# Patient Record
Sex: FEMALE | Race: ASIAN | Employment: FULL TIME | URBAN - METROPOLITAN AREA
[De-identification: names, ages, dates, MRNs, and addresses within clinical notes are randomized per-mention and may not be internally consistent; named-entity substitution may affect disease eponyms.]

---

## 2022-02-14 ENCOUNTER — OFFICE VISIT (OUTPATIENT)
Dept: OBGYN CLINIC | Age: 32
End: 2022-02-14

## 2022-02-14 ENCOUNTER — INITIAL PRENATAL (OUTPATIENT)
Dept: OBGYN CLINIC | Age: 32
End: 2022-02-14

## 2022-02-14 VITALS
WEIGHT: 142 LBS | SYSTOLIC BLOOD PRESSURE: 96 MMHG | BODY MASS INDEX: 26.81 KG/M2 | HEIGHT: 61 IN | DIASTOLIC BLOOD PRESSURE: 62 MMHG

## 2022-02-14 DIAGNOSIS — Z34.90 PREGNANCY, UNSPECIFIED GESTATIONAL AGE: ICD-10-CM

## 2022-02-14 DIAGNOSIS — Z34.02 ENCOUNTER FOR SUPERVISION OF NORMAL FIRST PREGNANCY IN SECOND TRIMESTER: ICD-10-CM

## 2022-02-14 DIAGNOSIS — Z34.02 ENCOUNTER FOR SUPERVISION OF NORMAL FIRST PREGNANCY IN SECOND TRIMESTER: Primary | ICD-10-CM

## 2022-02-14 LAB
ABO + RH BLD: NORMAL
ANTIBODY SCREEN, EXTERNAL: NEGATIVE
BLOOD BANK CMNT PATIENT-IMP: NORMAL
BLOOD GROUP ANTIBODIES SERPL: NORMAL
CHLAMYDIA, EXTERNAL: NEGATIVE
COMMENT, HOLDF: NORMAL
ERYTHROCYTE [DISTWIDTH] IN BLOOD BY AUTOMATED COUNT: 13.2 % (ref 11.5–14.5)
HBSAG, EXTERNAL: NEGATIVE
HBV SURFACE AG SER QL: <0.1 INDEX
HBV SURFACE AG SER QL: NEGATIVE
HCT VFR BLD AUTO: 41.2 % (ref 35–47)
HCV AB SERPL QL IA: NONREACTIVE
HEPATITIS C AB,   EXT: NON REACTIVE
HGB BLD-MCNC: 13.4 G/DL (ref 11.5–16)
HIV 1+2 AB+HIV1 P24 AG SERPL QL IA: NONREACTIVE
HIV, EXTERNAL: NON REACTIVE
HIV12 RESULT COMMENT, HHIVC: NORMAL
MCH RBC QN AUTO: 31.9 PG (ref 26–34)
MCHC RBC AUTO-ENTMCNC: 32.5 G/DL (ref 30–36.5)
MCV RBC AUTO: 98.1 FL (ref 80–99)
N. GONORRHEA, EXTERNAL: NEGATIVE
NRBC # BLD: 0 K/UL (ref 0–0.01)
NRBC BLD-RTO: 0 PER 100 WBC
PLATELET # BLD AUTO: 263 K/UL (ref 150–400)
PMV BLD AUTO: 10.4 FL (ref 8.9–12.9)
RBC # BLD AUTO: 4.2 M/UL (ref 3.8–5.2)
RUBELLA, EXTERNAL: NORMAL
RUBV IGG SER-IMP: REACTIVE
RUBV IGG SERPL IA-ACNC: 64.2 IU/ML
SAMPLES BEING HELD,HOLD: NORMAL
SPECIMEN EXP DATE BLD: NORMAL
T. PALLIDUM, EXTERNAL: NON REACTIVE
TYPE, ABO & RH, EXTERNAL: NORMAL
WBC # BLD AUTO: 9.3 K/UL (ref 3.6–11)

## 2022-02-14 PROCEDURE — 0502F SUBSEQUENT PRENATAL CARE: CPT | Performed by: OBSTETRICS & GYNECOLOGY

## 2022-02-14 NOTE — PROGRESS NOTES
Current pregnancy history:    Arnaldo Roberts is a  27 y.o. female  Patient's last menstrual period was 10/31/2021 (exact date). .  She presents for the evaluation of amenorrhea and a positive pregnancy test.    LMP history:  The date of her LMP is certain. Her last menstrual period was normal.  A urine pregnancy test was positive      Based on her LMP, her EDC is 8/7/22 and her EGA is 15 weeks,1 day. Her menstrual cycles are regular and occur approximately every 28 days  and range from 3 to 5 days. Ultrasound data:  She had an  ultrasound done by the ultrasound tech today which revealed a viable beltran pregnancy with a gestational age of 14 weeks and 2 days giving an [address] of 8/6/22. TA ULTRASOUND PERFORMED  A SINGLE VIABLE 15W2D WITH ANKIT OF 08/06/2022 IUP IS SEEN WITH NORMAL CARDIAC RHYTHM. [facility]. THERE APPEARS TO BE HYPOECHOIC CYSTIC STRUCTURES SEEN WITHIN THE CHOROIDS BILATERALLY, RT  MEASURING 5 X 5 MM AND LT MEASURING 6 X 6 MM . CLINICAL COORELATION RECOMMENDED. POSTERIOR PLACENTA IS SEEN. GENDER: WNL  RIGHT OVARY APPEARS WITHIN NORMAL LIMITS. LEFT OVARY OBSCURED BY BOWEL GAS. NO FREE FLUID IS SEEN IN THE CDS. Pregnancy symptoms:    Since her LMP she has experienced  urinary frequency, breast tenderness, and nausea. She has not been vomiting over the last few weeks. Associated signs and symptoms which she denies: dysuria, discharge, vaginal bleeding. Relevant past pregnancy history:   She has the following pregnancy history: 2 SAB    Relevant past medical history:(relevant to this pregnancy): noncontributory. Pap/Occupational history:  Last pap smear: last year Results: Normal            Substance history: negative for alcohol, tobacco and street drugs. Positive for nothing. Exposure history: There is/are no indoor cat/s in the home. The patient was instructed to not change the cat litter.    She admits close contact with children on a regular basis. She has had chicken pox or the vaccine in the past.   Patient denies issues with domestic violence. Genetic Screening/Teratology Counseling: (Includes patient, baby's father, or anyone in either family with:)  3.  Patient's age >/= 28 at EDC?--no  2. Thalassemia ([facility], [place], [address], or  background): MCV<80?--no.     3.  Neural tube defect (meningomyelocele, spina bifida, anencephaly)?--no.   4.  Congenital heart defect?--no.  5.  Down syndrome?--no.   6.  Sanford-Sachs (J[employer]ism, [place])?--no.   7.  Canavan's Disease?--no.   8.  Familial Dysautonomia?--no.   9.  Sickle cell disease or trait ()? --no   The patient has not been tested for sickle trait  10. Hemophilia or other blood disorders?--no. 11.  Muscular dystrophy?--no. 12.  Cystic fibrosis?--no. 13.  Taniya's Chorea?--no. 14.  Mental retardation/autism (if yes was person tested for Fragile X)?--no. 15.  Other inherited genetic or chromosomal disorder?--no. 12.  Maternal metabolic disorder (DM, PKU, etc)?--no. 17.  Patient or FOB with a child with a birth defect not listed above?--no.  17a. Patient or FOB with a birth defect themselves?--no. 18.  Recurrent pregnancy loss, or stillbirth?--no. 19.  Any medications since LMP other than prenatal vitamins (include vitamins, supplements, OTC meds, drugs, alcohol)?--no. 20.  Any other genetic/environmental exposure to discuss?--no. Infection History:  1. Lives with someone with TB or TB exposed?--no.   2.  Patient or partner has history of genital herpes?--no.  3.  Rash or viral illness since LMP?--no.    4.  History of STD (GC, CT, HPV, syphilis, HIV)? --no   5. Other: OTHER? Past Medical History:   Diagnosis Date    Kidney congenitally absent, right      History reviewed. No pertinent surgical history.   Social History     Occupational History    Not on file   Tobacco Use    Smoking status: Never Smoker    Smokeless tobacco: Never Used   Vaping Use    Vaping Use: Never used   Substance and Sexual Activity    Alcohol use: Not Currently    Drug use: Never    Sexual activity: Yes     Partners: Female     Birth control/protection: None     No family history on file. OB History    Para Term  AB Living   3       2     SAB IAB Ectopic Molar Multiple Live Births   2                # Outcome Date GA Lbr Zafar/2nd Weight Sex Delivery Anes PTL Lv   3 Current            2 SAB            1 SAB              No Known Allergies  Prior to Admission medications    Medication Sig Start Date End Date Taking? Authorizing Provider   PNV GQ.09/WKSSABW fum/folic ac (PRENATAL PO) Take  by mouth.    Yes Provider, Historical        Review of Systems: History obtained from the patient  Constitutional: negative for weight loss, fever, night sweats  HEENT: negative for hearing loss, earache, congestion, snoring, sore throat  CV: negative for chest pain, palpitations, edema  Resp: negative for cough, shortness of breath, wheezing  Breast: negative for breast lumps, nipple discharge, galactorrhea  GI: negative for change in bowel habits, abdominal pain, black or bloody stools  : negative for frequency, dysuria, hematuria, vaginal discharge  MSK: negative for back pain, joint pain, muscle pain  Skin: negative for itching, rash, hives  Neuro: negative for dizziness, headache, confusion, weakness  Psych: negative for anxiety, depression, change in mood  Heme/lymph: negative for bleeding, bruising, pallor    Objective:  Visit Vitals  BP 96/62   Ht 5' 1\" (1.549 m)   Wt 142 lb (64.4 kg)   LMP 10/31/2021 (Exact Date)   BMI 26.83 kg/m²       Physical Exam:     Constitutional  · Appearance: well-nourished, well developed, alert, in no acute distress    HENT  · Head  · Face: appears normal  · Eyes: appear normal  · Ears: normal  · Mouth: normal  · Lips: no lesions      Chest  · Respiratory Effort: breathing unlabored Cardiovascular  · Heart:  · Auscultation: regular rate and rhythm without murmur      Gastrointestinal  · Abdominal Examination: abdomen non-tender to palpation, normal bowel sounds, no masses present  · Liver and spleen: no hepatomegaly present, spleen not palpable  · Hernias: no hernias identified    Genitourinary  · deferred    Skin  · General Inspection: no rash, no lesions identified    Neurologic/Psychiatric  · Mental Status:  · Orientation: grossly oriented to person, place and time  · Mood and Affect: mood normal, affect appropriate    Assessment:   Intrauterine pregnancy with issues addressed in problem list  Plan:   Patient declines presence of chaperone during today's visit. Offered CF testing, CVS, Nuchal Translucency, MSAFP, amnio, and discussed NIPT  Course of pregnancy discussed including visit schedule, routine U/S, glucola testing, etc.  Avoid alcoholic beverages and illicit/recreational drugs use  Take prenatal vitamins or folic acid daily. Hospital and practice style discussed with coverage system. Discussed nutrition, toxoplasmosis precautions, sexual activity, exercise, need for influenza vaccine, environmental and work hazards, travel advice, screen for domestic violence, need for seat belts. Discussed seafood, unpasteurized dairy products, deli meat, artificial sweeteners, and caffeine. Discussed current prescription drug use. Given medication list.  Discussed the use of over the counter medications and chemicals. Route of delivery discussed, including risks, benefits     Handouts given to pt.

## 2022-02-14 NOTE — PATIENT INSTRUCTIONS
Weeks 14 to 18 of Your Pregnancy: Care Instructions  Overview     During this time, you may start to \"show,\" so that you look pregnant to people around you. You may also notice some changes in your skin, such as itchy spots on your palms or acne on your face. Your baby is now able to pass urine. And your baby's first stool (meconium) is starting to collect in your baby's intestines. Hair is also starting to grow on your baby's head. At your next visit, between weeks 18 and 20, your doctor may do an ultrasound test. The test allows your doctor to check for certain problems. Your doctor can also tell the sex of your baby. So this a good time to think about whether you want to know. Talk to your doctor about getting a flu shot to help keep you healthy during your pregnancy. As your pregnancy moves along, it's common to worry or feel anxious. Your body is changing a lot. And you are thinking about giving birth, the health of your baby, and becoming a parent. You can talk to your doctor about any anxiety and stress you feel. Follow-up care is a key part of your treatment and safety. Be sure to make and go to all appointments, and call your doctor if you are having problems. It's also a good idea to know your test results and keep a list of the medicines you take. How can you care for yourself at home? Reduce stress    · Ask for help with cooking and housekeeping.     · Figure out who or what causes your stress. Avoid these people or situations as much as possible.     · Relax every day. Taking 10- to 15-minute breaks can make a big difference. Take a walk, listen to music, or take a warm bath.     · Learn relaxation techniques at prenatal or yoga class. Or buy a relaxation tape.     · List your fears about having a baby and becoming a parent. Share the list with someone you trust. Decide which worries are really small, and try to let them go.    Exercise    · If you did not exercise much before pregnancy, start slowly. Walking is best. [employer], and do a little more every day.     · Brisk walking, easy jogging, low-impact aerobics, water aerobics, and yoga are good choices. Some sports, such as scuba diving, horseback riding, downhill skiing, gymnastics, and water skiing, are not a good idea.     · Try to do at least 2½ hours a week of moderate exercise, such as a fast walk. One way to do this is to be active 30 minutes a day, at least 5 days a week.     · Wear loose clothing. And wear shoes and a bra that provide good support.     · Warm up and cool down to start and finish your exercise.     · If you want to use weights, be sure to use light weights. They reduce stress on your joints. Stay at the best weight for you    · Experts recommend that you gain about 1 pound a month during the first 3 months of your pregnancy.     · Experts recommend that you gain about 1 pound a week during your last 6 months of pregnancy, for a total weight gain of 25 to 35 pounds.     · If you are underweight, you will need to gain more weight (about 28 to 40 pounds).     · If you are overweight, you may not need to gain as much weight (about 15 to 25 pounds).     · If you are gaining weight too fast, use common sense. Exercise every day, and limit sweets, fast foods, and fats. Choose lean meats, fruits, and vegetables.     · If you are having twins or more, your doctor may refer you to a dietitian. Where can you learn more? Go to http://www.gray.com/  Enter I453 in the search box to learn more about \"Weeks 14 to 18 of Your Pregnancy: Care Instructions. \"  Current as of: June 16, 2021               Content Version: 13.0  © 2636-9051 Healthwise, Incorporated. Care instructions adapted under license by [employer] (which disclaims liability or warranty for this information).  If you have questions about a medical condition or this instruction, always ask your healthcare professional. [employer] disclaims any warranty or liability for your use of this information.

## 2022-02-15 LAB
T PALLIDUM AB SER QL IA: NON REACTIVE
VZV IGG SER IA-ACNC: 399 INDEX

## 2022-02-16 LAB
C TRACH RRNA SPEC QL NAA+PROBE: NEGATIVE
HGB A MFR BLD: 97 % (ref 96.4–98.8)
HGB A2 MFR BLD COLUMN CHROM: 2.7 % (ref 1.8–3.2)
HGB F MFR BLD: 0.3 % (ref 0–2)
HGB FRACT BLD-IMP: NORMAL
HGB S MFR BLD: 0 %
N GONORRHOEA RRNA SPEC QL NAA+PROBE: NEGATIVE
T VAGINALIS DNA SPEC QL NAA+PROBE: NEGATIVE

## 2022-02-16 RX ORDER — NITROFURANTOIN 25; 75 MG/1; MG/1
100 CAPSULE ORAL 2 TIMES DAILY
Qty: 14 CAPSULE | Refills: 0 | Status: SHIPPED | OUTPATIENT
Start: 2022-02-16 | End: 2022-02-23

## 2022-02-16 NOTE — PROGRESS NOTES
Phone call into patient to inform of results. Questions answered. Confirmed pharmacy. Confirmed allergies. Script macrobid 100mg BID x 7 days sent per Dr. Debora Jones.

## 2022-02-17 LAB
BACTERIA SPEC CULT: ABNORMAL
CC UR VC: ABNORMAL
SERVICE CMNT-IMP: ABNORMAL

## 2022-03-19 PROBLEM — Z34.02 ENCOUNTER FOR SUPERVISION OF NORMAL FIRST PREGNANCY IN SECOND TRIMESTER: Status: ACTIVE | Noted: 2022-02-14

## 2022-03-22 ENCOUNTER — ROUTINE PRENATAL (OUTPATIENT)
Dept: OBGYN CLINIC | Age: 32
End: 2022-03-22

## 2022-03-22 VITALS — SYSTOLIC BLOOD PRESSURE: 108 MMHG | BODY MASS INDEX: 28.15 KG/M2 | DIASTOLIC BLOOD PRESSURE: 70 MMHG | WEIGHT: 149 LBS

## 2022-03-22 DIAGNOSIS — Z34.02 ENCOUNTER FOR SUPERVISION OF NORMAL FIRST PREGNANCY IN SECOND TRIMESTER: ICD-10-CM

## 2022-03-22 DIAGNOSIS — Z34.80 SUPERVISION OF OTHER NORMAL PREGNANCY: Primary | ICD-10-CM

## 2022-03-22 PROCEDURE — 0502F SUBSEQUENT PRENATAL CARE: CPT | Performed by: OBSTETRICS & GYNECOLOGY

## 2022-03-22 NOTE — PATIENT INSTRUCTIONS
Weeks 18 to 22 of Your Pregnancy: Care Instructions  Overview     Your baby is continuing to develop quickly. Sometime between 18 and 22 weeks, you'll probably start to feel your baby move. At first, these small fetal movements feel like fluttering or \"butterflies. \" Or they may feel like gas bubbles. As your baby grows, these movements will become stronger. You may also notice that your baby hiccups. Babies at this stage can now suck their thumbs. You may find that your nausea and fatigue are gone. You may feel better overall and have more energy than you did in your first trimester. But you might now also have some new discomforts, like sleep problems or leg cramps. Talk to your doctor about things you can do at home to ease these problems. Follow-up care is a key part of your treatment and safety. Be sure to make and go to all appointments, and call your doctor if you are having problems. It's also a good idea to know your test results and keep a list of the medicines you take. How can you care for yourself at home? Ease sleep problems  · Avoid caffeine in drinks or chocolate late in the day. · Get some exercise every day. · Take a warm shower or bath before bed. · Have a light snack or glass of milk at bedtime. · Do relaxation exercises in bed to calm your mind and body. · Support your legs and back with extra pillows. Try a pillow between your legs if you sleep on your side. · Do not use sleeping pills or alcohol. They could harm your baby. Ease leg cramps  · Do not massage your calf during the cramp. · Sit on a firm bed or chair. Straighten your leg, and bend your foot (flex your ankle) slowly upward, toward your knee. Bend your toes up and down. · Stand on a cool, flat surface. Stretch your toes upward, and take small steps walking on your heels. · Use a heating pad or hot water bottle to help with muscle ache. Prevent leg cramps  · Be sure to get enough calcium.  If you are worried that you are not getting enough, talk to your doctor. · Exercise every day, and stretch your legs before bed. · Take a warm bath before bed, and try leg warmers at night. Where can you learn more? Go to http://www.gray.com/  Enter L694 in the search box to learn more about \"Weeks 18 to 22 of Your Pregnancy: Care Instructions. \"  Current as of: June 16, 2021               Content Version: 13.2  © 2006-2022 Healthwise, [employer]. Care instructions adapted under license by [employer] (which disclaims liability or warranty for this information). If you have questions about a medical condition or this instruction, always ask your healthcare professional. [address] any warranty or liability for your use of this information.

## 2022-03-22 NOTE — PROGRESS NOTES
Doing well overall  UTI TONI today    FAS today:  A SINGLE VIABLE IUP AT 20W2D IS SEEN. FETAL CARDIAC MOTION OBSERVED. FETAL ANATOMY WAS WELL VISUALIZED AND APPEARS WNL. NO ABNORMALITIES WERE SEEN ON TODAYS EXAM.  APPROPRIATE GROWTH MEASURED. SIZE = DATES. EVELYN AND CERVIX APPEAR WITHIN NORMAL LIMITS. POSTERIOR PLACENTA APPEARS 1 CM COVERING THE INTERNAL CERVICAL OS.   GENDER: WNL

## 2022-03-24 LAB
BACTERIA SPEC CULT: NORMAL
CC UR VC: NORMAL
SERVICE CMNT-IMP: NORMAL

## 2022-04-18 NOTE — PATIENT INSTRUCTIONS
Weeks 22 to 26 of Your Pregnancy: Care Instructions  Overview     As you enter your 7th month of pregnancy at week 26, your baby's lungs are growing stronger and getting ready to breathe. You may notice that your baby responds to the sound of your voice. You may also notice that your baby does less turning and twisting and more squirming, kicking, or jerking. Jerking often means that your baby has hiccups. Hiccups are normal and are only temporary. You may want to think about attending a childbirth preparation class. This is also a good time to start thinking about whether you want to have pain medicine during labor. You may be tested for gestational diabetes between weeks 25 and 28. Gestational diabetes occurs when your blood sugar level gets too high when you're pregnant. The test is important, because you can have gestational diabetes and not know it. But the condition can cause problems for your baby. Follow-up care is a key part of your treatment and safety. Be sure to make and go to all appointments, and call your doctor if you are having problems. It's also a good idea to know your test results and keep a list of the medicines you take. How can you care for yourself at home? Ease discomfort from your baby's kicking  · Change your position. Sometimes this will cause your baby to change position too. · Take a deep breath while you raise your arm over your head. Then breathe out while you drop your arm. Do Kegel exercises to prevent urine from leaking  · You can do Kegel exercises while you stand or sit. ? Squeeze the same muscles you would use to stop your urine. Your belly and thighs should not move. ? Hold the squeeze for 3 seconds, and then relax for 3 seconds. ? Start with 3 seconds. Then add 1 second each week until you are able to squeeze for 10 seconds. ? Repeat the exercise 10 to 15 times for each session. Do three or more sessions each day.   Ease or reduce swelling in your feet, ankles, hands, and fingers  · If your fingers are puffy, take off your rings. · Do not eat high-salt foods, such as potato chips. · Prop up your feet on a stool or couch as much as possible. Sleep with pillows under your feet. · Do not stand for long periods of time or wear tight shoes. · Wear support stockings. Where can you learn more? Go to http://www.cao.com/  Enter G264 in the search box to learn more about \"Weeks 22 to 26 of Your Pregnancy: Care Instructions. \"  Current as of: June 16, 2021               Content Version: 13.2  © 3090-3933 [employer]. Care instructions adapted under license by [employer] (which disclaims liability or warranty for this information). If you have questions about a medical condition or this instruction, always ask your healthcare professional. [address] any warranty or liability for your use of this information.

## 2022-04-19 ENCOUNTER — ROUTINE PRENATAL (OUTPATIENT)
Dept: OBGYN CLINIC | Age: 32
End: 2022-04-19

## 2022-04-19 VITALS — SYSTOLIC BLOOD PRESSURE: 122 MMHG | BODY MASS INDEX: 29.29 KG/M2 | DIASTOLIC BLOOD PRESSURE: 80 MMHG | WEIGHT: 155 LBS

## 2022-04-19 DIAGNOSIS — Z34.80 SUPERVISION OF OTHER NORMAL PREGNANCY, ANTEPARTUM: Primary | ICD-10-CM

## 2022-04-19 NOTE — PROGRESS NOTES
Patient consented to RN visit, MD at hospital  Counseled on heartburn prevention measures and medications safe in pregnancy; recommended pepcid  Discussed weight gain; advised adding in protein each meal, being mindful of carbs and healthy eating habits  Reviewed glucola at next visit  Will update MD

## 2022-04-19 NOTE — PROGRESS NOTES
Patient is doing well  Pt reports heart burn that causes nausea  +FM  Pt concerned about her weight gain

## 2022-05-16 NOTE — PATIENT INSTRUCTIONS
Weeks 26 to 30 of Your Pregnancy: Care Instructions  Overview     You are now entering your last trimester of pregnancy. Your baby is growing quickly. Kimberlymena Santana probably feel your baby moving around more often. Your doctor may ask you to count your baby's kicks. Your back may ache as your body gets used to your baby's size and length. If you haven't already had the Tdap shot during this pregnancy, talk to your doctor about getting it. It will help protect your  against pertussis infection. During this time, it's important to take care of yourself and pay attention to what your body needs. If you feel sexual, you can explore ways to be close with your partner that match your comfort and desire. Follow-up care is a key part of your treatment and safety. Be sure to make and go to all appointments, and call your doctor if you are having problems. It's also a good idea to know your test results and keep a list of the medicines you take. How can you care for yourself at home? Take it easy at work  · Take frequent breaks. If possible, stop working when you are tired, and rest during your lunch hour. · Take bathroom breaks every 2 hours. · Change positions often. If you sit for long periods, stand up and walk around. · When you stand for a long time, keep one foot on a low stool with your knee bent. After standing a lot, sit with your feet up. · Avoid fumes, chemicals, and tobacco smoke. Be sexual in your own way  · Having sex during pregnancy is okay, unless your doctor tells you not to. · You may be very interested in sex, or you may have no interest at all. · Your growing belly can make it hard to find a good position during intercourse. [place] and explore. · You may get cramps in your uterus when your partner touches your breasts. · A back rub may relieve the backache or cramps that sometimes follow orgasm. Learn about  labor  · Watch for signs of  labor.  You may be going into labor if:  ? You have menstrual-like cramps, with or without nausea. ? You have about 6 or more contractions in 1 hour, even after you have had a glass of water and are resting. ? You have a low, dull backache that does not go away when you change your position. ? You have pain or pressure in your pelvis that comes and goes in a pattern. ? You have intestinal cramping or flu-like symptoms, with or without diarrhea.  ? You notice an increase or change in your vaginal discharge. Discharge may be heavy, mucus-like, watery, or streaked with blood. ? Your water breaks. · If you think you have  labor:  ? Drink 2 or 3 glasses of water or juice. Not drinking enough fluids can cause contractions. ? Stop what you are doing, and empty your bladder. Then lie down on your left side for at least 1 hour. ? While lying on your side, find your breast bone. Put your fingers in the soft spot just below it. Move your fingers down toward your belly button to find the top of your uterus. Check to see if it is tight. ? Contractions can be weak or strong. Record your contractions for an hour. Time a contraction from the start of one contraction to the start of the next one.  ? Single or several strong contractions without a pattern are called [place]-Herrera contractions. They are practice contractions but not the start of labor. They often stop if you change what you are doing. ? Call your doctor if you have regular contractions. Where can you learn more? Go to http://www.gray.com/  Enter F542 in the search box to learn more about \"Weeks 26 to 30 of Your Pregnancy: Care Instructions. \"  Current as of: 2021               Content Version: 13.2  © 5452-9822 [employer]. Care instructions adapted under license by [employer] (which disclaims liability or warranty for this information).  If you have questions about a medical condition or this instruction, always ask your healthcare professional. [place] [address] any warranty or liability for your use of this information.

## 2022-05-17 ENCOUNTER — ROUTINE PRENATAL (OUTPATIENT)
Dept: OBGYN CLINIC | Age: 32
End: 2022-05-17
Payer: COMMERCIAL

## 2022-05-17 VITALS — WEIGHT: 159 LBS | BODY MASS INDEX: 30.04 KG/M2 | DIASTOLIC BLOOD PRESSURE: 69 MMHG | SYSTOLIC BLOOD PRESSURE: 114 MMHG

## 2022-05-17 DIAGNOSIS — Z34.02 ENCOUNTER FOR SUPERVISION OF NORMAL FIRST PREGNANCY IN SECOND TRIMESTER: ICD-10-CM

## 2022-05-17 DIAGNOSIS — Z34.83 ENCOUNTER FOR SUPERVISION OF OTHER NORMAL PREGNANCY IN THIRD TRIMESTER: Primary | ICD-10-CM

## 2022-05-17 PROCEDURE — 90715 TDAP VACCINE 7 YRS/> IM: CPT | Performed by: OBSTETRICS & GYNECOLOGY

## 2022-05-17 PROCEDURE — 0502F SUBSEQUENT PRENATAL CARE: CPT | Performed by: OBSTETRICS & GYNECOLOGY

## 2022-05-17 PROCEDURE — 90471 IMMUNIZATION ADMIN: CPT | Performed by: OBSTETRICS & GYNECOLOGY

## 2022-05-18 LAB
ERYTHROCYTE [DISTWIDTH] IN BLOOD BY AUTOMATED COUNT: 13.2 % (ref 11.5–14.5)
GLUCOSE 1H P 100 G GLC PO SERPL-MCNC: 150 MG/DL (ref 65–140)
HCT VFR BLD AUTO: 39.5 % (ref 35–47)
HGB BLD-MCNC: 12 G/DL (ref 11.5–16)
HIV 1+2 AB+HIV1 P24 AG SERPL QL IA: NONREACTIVE
HIV12 RESULT COMMENT, HHIVC: NORMAL
MCH RBC QN AUTO: 31.7 PG (ref 26–34)
MCHC RBC AUTO-ENTMCNC: 30.4 G/DL (ref 30–36.5)
MCV RBC AUTO: 104.2 FL (ref 80–99)
NRBC # BLD: 0 K/UL (ref 0–0.01)
NRBC BLD-RTO: 0 PER 100 WBC
PLATELET # BLD AUTO: 229 K/UL (ref 150–400)
PMV BLD AUTO: 10.8 FL (ref 8.9–12.9)
RBC # BLD AUTO: 3.79 M/UL (ref 3.8–5.2)
WBC # BLD AUTO: 8.8 K/UL (ref 3.6–11)

## 2022-05-19 LAB
BLOOD BANK CMNT PATIENT-IMP: NORMAL
BLOOD GROUP ANTIBODIES SERPL: NORMAL
T PALLIDUM AB SER QL IA: NON REACTIVE

## 2022-05-26 ENCOUNTER — LAB ONLY (OUTPATIENT)
Dept: OBGYN CLINIC | Age: 32
End: 2022-05-26

## 2022-05-26 DIAGNOSIS — Z34.83 ENCOUNTER FOR SUPERVISION OF OTHER NORMAL PREGNANCY IN THIRD TRIMESTER: Primary | ICD-10-CM

## 2022-05-27 DIAGNOSIS — O24.410 DIET CONTROLLED GESTATIONAL DIABETES MELLITUS (GDM) IN THIRD TRIMESTER: Primary | ICD-10-CM

## 2022-05-27 LAB
GESTATIONAL 3HR GTT,GESTA: ABNORMAL
GLUCOSE 1H P 100 G GLC PO SERPL-MCNC: 193 MG/DL (ref 65–180)
GLUCOSE P FAST SERPL-MCNC: 83 MG/DL (ref 65–95)
GLUCOSE, 2 HR,GSTT2: 173 MG/DL (ref 65–155)
GLUCOSE, 3 HR,GSTT3: 106 MG/DL (ref 65–140)

## 2022-05-27 RX ORDER — LANCETS
EACH MISCELLANEOUS
Qty: 200 EACH | Refills: 1 | Status: SHIPPED | OUTPATIENT
Start: 2022-05-27 | End: 2022-09-23 | Stop reason: ALTCHOICE

## 2022-05-27 RX ORDER — INSULIN PUMP SYRINGE, 3 ML
EACH MISCELLANEOUS
Qty: 1 KIT | Refills: 0 | Status: SHIPPED | OUTPATIENT
Start: 2022-05-27 | End: 2022-09-23 | Stop reason: ALTCHOICE

## 2022-05-27 NOTE — PROGRESS NOTES
Spoke with patient, explained results of 3 hour GTT. Discussed need to start checking blood glucose at home, supplies to be sent to her pharmacy. Referral order placed for diabetic counseling, patient aware to expect them to reach out.

## 2022-05-27 NOTE — PATIENT INSTRUCTIONS
Weeks 30 to 32 of Your Pregnancy: Care Instructions  Overview     You've made it to the final months of your pregnancy! By now your baby is really starting to look like a baby, with hair and plump skin. As you enter the final weeks of pregnancy, the reality of having a baby may start to set in. This is a good time to set up a safe nursery and find quality  if needed. Doing this stuff ahead of time will allow you to focus on caring for and enjoying your new baby. You may also want to take a tour of your hospital's labor and delivery unit. This will help you get a better idea of what to expect while you're in the hospital.  During these last months, be sure to take good care of yourself. Pay attention to what your body needs. If your doctor says it's okay for you to work, don't push yourself too hard. If you haven't already had the Tdap shot during this pregnancy, talk to your doctor about getting it. It will help protect your  against pertussis infection. Follow-up care is a key part of your treatment and safety. Be sure to make and go to all appointments, and call your doctor if you are having problems. It's also a good idea to know your test results and keep a list of the medicines you take. How can you care for yourself at home? Pay attention to your baby's movements  · You should feel your baby move several times every day. · Your baby now turns less, and kicks and jabs more. · Your baby sleeps 20 to 45 minutes at a time and is more active at certain times of day. · If your doctor wants you to count your baby's kicks:  ? Empty your bladder, and lie on your side or relax in a comfortable chair. ? Write down your start time. ? Pay attention only to your baby's movements. Count any movement except hiccups. ? After you have counted 10 movements, write down your stop time. ? Write down how many minutes it took for your baby to move 10 times.   ? If an hour goes by and you have not recorded 10 movements, have something to eat or drink and then count for another hour. If you don't record at least 10 movements in the 2-hour period, call your doctor. Ease heartburn  · Eat small, frequent meals. · Do not eat chocolate, peppermint, or very spicy foods. Avoid drinks with caffeine, such as coffee, tea, and sodas. · Avoid bending over or lying down after meals. · Take a short walk after you eat. · If heartburn is a problem at night, do not eat for 2 hours before bedtime. · Take antacids like Mylanta, Maalox, Rolaids, or Tums. Do not take antacids that have sodium bicarbonate. Care for varicose veins  · Varicose veins are blood vessels that stretch out with the extra blood during pregnancy. Your legs may ache or throb. Most varicose veins will go away after the birth. · Avoid standing for long periods of time. Sit with your legs crossed at the ankles, not the knees. · Sit with your feet propped up. · Avoid tight clothing or stockings. Wear support hose. · Exercise regularly. Try walking for at least 30 minutes a day. Where can you learn more? Go to http://www.gray.com/  Enter X471 in the search box to learn more about \"Weeks 30 to 32 of Your Pregnancy: Care Instructions. \"  Current as of: June 16, 2021               Content Version: 13.2  © 8194-1618 [employer]. Care instructions adapted under license by [employer] (which disclaims liability or warranty for this information). If you have questions about a medical condition or this instruction, always ask your healthcare professional. [place] [address] any warranty or liability for your use of this information.

## 2022-05-31 ENCOUNTER — ROUTINE PRENATAL (OUTPATIENT)
Dept: OBGYN CLINIC | Age: 32
End: 2022-05-31
Payer: COMMERCIAL

## 2022-05-31 VITALS — DIASTOLIC BLOOD PRESSURE: 64 MMHG | SYSTOLIC BLOOD PRESSURE: 102 MMHG | BODY MASS INDEX: 30.04 KG/M2 | WEIGHT: 159 LBS

## 2022-05-31 DIAGNOSIS — Z34.83 ENCOUNTER FOR SUPERVISION OF OTHER NORMAL PREGNANCY IN THIRD TRIMESTER: Primary | ICD-10-CM

## 2022-05-31 PROCEDURE — 0502F SUBSEQUENT PRENATAL CARE: CPT | Performed by: OBSTETRICS & GYNECOLOGY

## 2022-05-31 NOTE — PROGRESS NOTES
Doing well, good FM  Elevated 3 hr GTT; still waiting to work with [facility]; will start checking accuchecks

## 2022-06-13 NOTE — PATIENT INSTRUCTIONS
Weeks 32 to 34 of Your Pregnancy: Care Instructions  Overview     During the last few weeks of your pregnancy, you may have more aches and pains. It's important to rest when you can. Your growing baby is putting more pressure on your bladder. So you may need to urinate more often. Hemorrhoids are also common. These are painful, itchy veins in the rectal area. You may want to talk with your doctor about banking your baby's umbilical cord blood. This is the blood left in the cord after birth. If you want to save this blood, you must arrange it ahead of time. You can't decide at the last minute. If you haven't already had the Tdap shot during this pregnancy, talk to your doctor about getting it. It will help protect your  against pertussis infection. Follow-up care is a key part of your treatment and safety. Be sure to make and go to all appointments, and call your doctor if you are having problems. It's also a good idea to know your test results and keep a list of the medicines you take. How can you care for yourself at home? Ease hemorrhoids  · Get more liquids, fruits, vegetables, and fiber in your diet. This will help keep your stools soft. · Avoid sitting for too long. Lie on your left side several times a day. · Clean yourself with soft, moist toilet paper. Or you can use witch hazel pads or personal hygiene pads. · If you are uncomfortable, try ice packs. Or you can sit in a warm sitz bath. Do these for 20 minutes at a time, as needed. · Use hydrocortisone cream for pain and itching. Two examples are Anusol and Preparation H Hydrocortisone. · Ask your doctor about taking an over-the-counter stool softener. Consider breastfeeding  · Experts recommend breastfeeding for 1 year or longer. · Breast milk may help protect your child from some health problems.  babies are less likely than formula-fed babies to:  ? Get ear infections, colds, diarrhea, and pneumonia. ?  Be obese or get diabetes later in life. · Breastfeeding causes the release of a hormone called oxytocin. This hormone may help your uterus shrink back faster. · Breastfeeding may help you lose weight faster. Making milk burns calories. · Breastfeeding can lower your risk of breast cancer, ovarian cancer, and osteoporosis. Decide about circumcision for your baby  · As you make this decision, it may help to think about your personal, Islam, and family traditions. You get to decide if you will keep your baby's penis natural or if your baby will be circumcised. · If you decide that you would like to have your baby circumcised, talk with your doctor. You can share your concerns about pain. And you can discuss your preferences for anesthesia. Where can you learn more? Go to http://www.mSpot.com/  Enter X711 in the search box to learn more about \"Weeks 32 to 34 of Your Pregnancy: Care Instructions. \"  Current as of: June 16, 2021               Content Version: 13.2  © 9916-6324 [employer]. Care instructions adapted under license by [employer] (which disclaims liability or warranty for this information). If you have questions about a medical condition or this instruction, always ask your healthcare professional. [address] any warranty or liability for your use of this information.

## 2022-06-14 ENCOUNTER — ROUTINE PRENATAL (OUTPATIENT)
Dept: OBGYN CLINIC | Age: 32
End: 2022-06-14
Payer: COMMERCIAL

## 2022-06-14 VITALS — WEIGHT: 161 LBS | DIASTOLIC BLOOD PRESSURE: 74 MMHG | SYSTOLIC BLOOD PRESSURE: 122 MMHG | BODY MASS INDEX: 30.42 KG/M2

## 2022-06-14 DIAGNOSIS — Z3A.32 32 WEEKS GESTATION OF PREGNANCY: Primary | ICD-10-CM

## 2022-06-14 DIAGNOSIS — Z34.02 ENCOUNTER FOR SUPERVISION OF NORMAL FIRST PREGNANCY IN SECOND TRIMESTER: ICD-10-CM

## 2022-06-14 PROCEDURE — 0502F SUBSEQUENT PRENATAL CARE: CPT | Performed by: OBSTETRICS & GYNECOLOGY

## 2022-06-14 NOTE — PROGRESS NOTES
Patient is doing well  Has values from taking blood sugars; overall, doing well but some mildly elevated; 36wk US  +FM  Has an increase in pelvic pain

## 2022-06-17 DIAGNOSIS — Z34.02 ENCOUNTER FOR SUPERVISION OF NORMAL FIRST PREGNANCY IN SECOND TRIMESTER: ICD-10-CM

## 2022-06-17 RX ORDER — METFORMIN HYDROCHLORIDE 500 MG/1
500 TABLET, EXTENDED RELEASE ORAL
Qty: 30 TABLET | Refills: 2 | Status: SHIPPED | OUTPATIENT
Start: 2022-06-17 | End: 2022-07-22

## 2022-06-22 ENCOUNTER — PATIENT MESSAGE (OUTPATIENT)
Dept: OBGYN CLINIC | Age: 32
End: 2022-06-22

## 2022-06-22 NOTE — TELEPHONE ENCOUNTER
Phone call in to patient to discuss concerns. Baby movement is good. She denies fever. Provided precautions on when to visit L&D. Reviewed symptoms with Dr. Xavier Meyer, advised patient to begin low dose aspirin, hold metformin at night if reducing food intake, and add ultrasound to 36-37 week appointment. Ultrasound already scheduled for 7/19. Patient verbalized understanding.

## 2022-06-30 ENCOUNTER — ROUTINE PRENATAL (OUTPATIENT)
Dept: OBGYN CLINIC | Age: 32
End: 2022-06-30
Payer: COMMERCIAL

## 2022-06-30 VITALS — DIASTOLIC BLOOD PRESSURE: 80 MMHG | BODY MASS INDEX: 29.85 KG/M2 | SYSTOLIC BLOOD PRESSURE: 120 MMHG | WEIGHT: 158 LBS

## 2022-06-30 DIAGNOSIS — Z3A.34 34 WEEKS GESTATION OF PREGNANCY: Primary | ICD-10-CM

## 2022-06-30 DIAGNOSIS — Z34.02 ENCOUNTER FOR SUPERVISION OF NORMAL FIRST PREGNANCY IN SECOND TRIMESTER: ICD-10-CM

## 2022-06-30 PROCEDURE — 0502F SUBSEQUENT PRENATAL CARE: CPT | Performed by: OBSTETRICS & GYNECOLOGY

## 2022-06-30 NOTE — PATIENT INSTRUCTIONS
Weeks 34 to 36 of Your Pregnancy: Care Instructions  Overview     By now, your baby and your belly have grown quite large. It's almost time to give birth! Your baby's lungs are almost ready to breathe air. The skull bones are firm enough to protect your baby's head, but soft enough to move down through the birth canal.  You may be feeling excited and happy at times--but also anxious or scared. You might wonder how you'll know if you're in labor or what to expect during labor. Try to be open and flexible in your expectations of the birth. Because each birth is different, there's no way to know exactly what childbirth will be like for you. Talk to your doctor or midwife about any concerns you have. If you haven't already had the Tdap shot during this pregnancy, talk to your doctor about getting it. It will help protect your  against pertussis infection. In the 36th week, you'll probably have a test for group B streptococcus (GBS). GBS is a common type of bacteria that can live in the vagina and rectum. It can make your baby sick after birth. If you test positive, you will get antibiotics during labor. The medicine will help keep your baby from getting the bacteria. Follow-up care is a key part of your treatment and safety. Be sure to make and go to all appointments, and call your doctor if you are having problems. It's also a good idea to know your test results and keep a list of the medicines you take. How can you care for yourself at home? Learn about pain relief choices  · Pain is different for everyone. Talk with your doctor about your feelings about pain. · You can choose from several types of pain relief. These include medicine, breathing techniques, and comfort measures. You can use more than one option. · If you choose to have pain medicine during labor, talk to your doctor about your options. Some medicines lower anxiety and help with some of the pain.  Others make your lower body numb so that you won't feel pain. · Be sure to tell your doctor about your pain medicine choice before you start labor or very early in your labor. You may be able to change your mind as labor progresses. Labor and delivery  · The first stage of labor has three parts: early, active, and transition. ? It's common to have early labor at home. You can stay busy or rest, eat light snacks, drink clear fluids, and start counting contractions. ? When talking during a contraction gets hard, you may be moving to active labor. During active labor, you should head for the hospital if you aren't there already. ? You are in active labor when contractions come every 3 to 4 minutes and last about 60 seconds. Your cervix is opening more rapidly. ? If your water breaks, contractions will come faster and stronger. ? During transition, your cervix is stretching, and contractions are coming more rapidly. ? You may want to push, but your cervix might not be ready. Your doctor will tell you when to push. · The second stage starts when your cervix is completely opened and you are ready to push. ? Contractions are very strong to push the baby down the birth canal.  ? You will probably feel the urge to push. You may feel like you need to have a bowel movement. ? You may be coached to push with contractions. These contractions will be very strong, but you won't have them as often. You can get a little rest between contractions. ? One last push, and your baby is born. · The third stage is when a few more contractions push out the placenta. This may take 30 minutes or less. Where can you learn more? Go to http://www.gray.com/  Enter B912 in the search box to learn more about \"Weeks 34 to 36 of Your Pregnancy: Care Instructions. \"  Current as of: June 16, 2021               Content Version: 13.2  © 0245-5778 [employer].    Care instructions adapted under license by [employer] (which disclaims liability or warranty for this information). If you have questions about a medical condition or this instruction, always ask your healthcare professional. [address] any warranty or liability for your use of this information.

## 2022-06-30 NOTE — PROGRESS NOTES
Patient is doing well  Recovering from [address] started her metformin back; admits to not checking accuchecks; reviewed concerns and she'll update office w readings  +FM  Reviewed poor weight gain; no appetite during Covid

## 2022-07-14 ENCOUNTER — ROUTINE PRENATAL (OUTPATIENT)
Dept: OBGYN CLINIC | Age: 32
End: 2022-07-14
Payer: COMMERCIAL

## 2022-07-14 VITALS — BODY MASS INDEX: 30.23 KG/M2 | DIASTOLIC BLOOD PRESSURE: 74 MMHG | WEIGHT: 160 LBS | SYSTOLIC BLOOD PRESSURE: 118 MMHG

## 2022-07-14 DIAGNOSIS — Z34.02 ENCOUNTER FOR SUPERVISION OF NORMAL FIRST PREGNANCY IN SECOND TRIMESTER: ICD-10-CM

## 2022-07-14 DIAGNOSIS — Z3A.36 36 WEEKS GESTATION OF PREGNANCY: Primary | ICD-10-CM

## 2022-07-14 LAB — GRBS, EXTERNAL: NEGATIVE

## 2022-07-14 PROCEDURE — 0502F SUBSEQUENT PRENATAL CARE: CPT | Performed by: OBSTETRICS & GYNECOLOGY

## 2022-07-14 NOTE — PATIENT INSTRUCTIONS
Weeks 34 to 36 of Your Pregnancy: Care Instructions  Overview     By now, your baby and your belly have grown quite large. It's almost time to give birth! Your baby's lungs are almost ready to breathe air. The skull bones are firm enough to protect your baby's head, but soft enough to move down through the birth canal.  You may be feeling excited and happy at times--but also anxious or scared. You might wonder how you'll know if you're in labor or what to expect during labor. Try to be open and flexible in your expectations of the birth. Because each birth is different, there's no way to know exactly what childbirth will be like for you. Talk to your doctor or midwife about any concerns you have. If you haven't already had the Tdap shot during this pregnancy, talk to your doctor about getting it. It will help protect your  against pertussis infection. In the 36th week, you'll probably have a test for group B streptococcus (GBS). GBS is a common type of bacteria that can live in the vagina and rectum. It can make your baby sick after birth. If you test positive, you will get antibiotics during labor. The medicine will help keep your baby from getting the bacteria. Follow-up care is a key part of your treatment and safety. Be sure to make and go to all appointments, and call your doctor if you are having problems. It's also a good idea to know your test results and keep a list of the medicines you take. How can you care for yourself at home? Learn about pain relief choices  · Pain is different for everyone. Talk with your doctor about your feelings about pain. · You can choose from several types of pain relief. These include medicine, breathing techniques, and comfort measures. You can use more than one option. · If you choose to have pain medicine during labor, talk to your doctor about your options. Some medicines lower anxiety and help with some of the pain.  Others make your lower body numb so that you won't feel pain. · Be sure to tell your doctor about your pain medicine choice before you start labor or very early in your labor. You may be able to change your mind as labor progresses. Labor and delivery  · The first stage of labor has three parts: early, active, and transition. ? It's common to have early labor at home. You can stay busy or rest, eat light snacks, drink clear fluids, and start counting contractions. ? When talking during a contraction gets hard, you may be moving to active labor. During active labor, you should head for the hospital if you aren't there already. ? You are in active labor when contractions come every 3 to 4 minutes and last about 60 seconds. Your cervix is opening more rapidly. ? If your water breaks, contractions will come faster and stronger. ? During transition, your cervix is stretching, and contractions are coming more rapidly. ? You may want to push, but your cervix might not be ready. Your doctor will tell you when to push. · The second stage starts when your cervix is completely opened and you are ready to push. ? Contractions are very strong to push the baby down the birth canal.  ? You will probably feel the urge to push. You may feel like you need to have a bowel movement. ? You may be coached to push with contractions. These contractions will be very strong, but you won't have them as often. You can get a little rest between contractions. ? One last push, and your baby is born. · The third stage is when a few more contractions push out the placenta. This may take 30 minutes or less. Where can you learn more? Go to http://www.gray.com/  Enter B912 in the search box to learn more about \"Weeks 34 to 36 of Your Pregnancy: Care Instructions. \"  Current as of: June 16, 2021               Content Version: 13.2  © 7667-9555 [employer].    Care instructions adapted under license by [employer] (which disclaims liability or warranty for this information). If you have questions about a medical condition or this instruction, always ask your healthcare professional. [address] any warranty or liability for your use of this information.

## 2022-07-18 NOTE — PATIENT INSTRUCTIONS
Week 40 of Your Pregnancy: Care Instructions  Overview     You are near the end of your pregnancy--and you're probably pretty uncomfortable. It may be harder to walk around. Lying down probably isn't comfortable either. You may have trouble getting to sleep or staying asleep. Most babies are born between 40 and 41 weeks. This is a good time to think about packing a bag for the hospital with items you'll need. Then you'll be ready when labor starts. Follow-up care is a key part of your treatment and safety. Be sure to make and go to all appointments, and call your doctor if you are having problems. It's also a good idea to know your test results and keep a list of the medicines you take. How can you care for yourself at home? Learn about breastfeeding  · Breastfeeding is best for your baby and good for you. · Breast milk has antibodies to help your baby fight infections. · If you breastfeed, you may lose weight faster. That's because making milk burns calories. · Learning the best ways to hold your baby will make breastfeeding easier. · Sometimes breastfeeding can make partners feel left out. If you have a partner, plan how you can care for your baby together. For example, your partner can bathe and diaper the baby. You can snuggle together when you breastfeed. · You may want to learn how to use a breast pump and store your milk. · If you choose to bottle feed, make the feeding feel like breastfeeding so you can bond with your baby. Always hold your baby and the bottle. Don't prop bottles or let your baby fall asleep with a bottle. Learn about crying  · It's common for babies to cry for 1 to 3 hours a day. Some cry more, and some cry less. · Babies don't cry to make you upset or because you're a bad parent. · Crying is how your baby communicates. Your baby may be hungry; have gas; need a diaper change; or feel cold, warm, tired, lonely, or tense. Sometimes babies cry for unknown reasons.   · If you respond to your baby's needs, your baby will learn to trust you. · Try to stay calm when your baby cries. Your baby may get more upset if they sense that you are upset. Know how to care for your   · Your baby's umbilical cord stump will drop off on its own, usually between 1 and 2 weeks. To care for your baby's umbilical cord area:  ? Clean the area at the bottom of the cord 2 or 3 times a day. ? Pay special attention to the area where the cord attaches to the skin. ? Keep the diaper folded below the cord. ? Use a damp washcloth or cotton ball to sponge bathe your baby until the stump has come off. · Your baby's first dark stool is called meconium. After the meconium is passed, your baby will develop their own bowel pattern. ? Some babies, especially  babies, have several bowel movements a day. Others have one or two a day, or one every 2 to 3 days. ?  babies often have loose, yellow stools. Formula-fed babies have more formed stools. ? If your baby's stools look like little pellets, your baby is constipated. After 2 days of constipation, call your baby's doctor. · If your baby will be circumcised, you can care for your baby at home. ? Gently rinse your baby's penis with warm water after every diaper change. Don't try to remove the film that forms on the penis. This film will go away on its own. Pat dry. ? Put petroleum ointment, such as Vaseline, on the area of the diaper that will touch your baby's penis. This will keep the diaper from sticking to your baby. ? Ask the doctor about giving your baby acetaminophen (Tylenol) for pain. Where can you learn more? Go to http://www.gray.com/  Enter N257 in the search box to learn more about \"Week 37 of Your Pregnancy: Care Instructions. \"  Current as of: 2021               Content Version: 13.2  © 0394-2415 [employer].    Care instructions adapted under license by [employer] (which disclaims liability or warranty for this information). If you have questions about a medical condition or this instruction, always ask your healthcare professional. [address] any warranty or liability for your use of this information.

## 2022-07-19 ENCOUNTER — ROUTINE PRENATAL (OUTPATIENT)
Dept: OBGYN CLINIC | Age: 32
End: 2022-07-19

## 2022-07-19 ENCOUNTER — HOSPITAL ENCOUNTER (INPATIENT)
Age: 32
LOS: 3 days | Discharge: HOME | End: 2022-07-22
Attending: OBSTETRICS & GYNECOLOGY | Admitting: OBSTETRICS & GYNECOLOGY
Payer: COMMERCIAL

## 2022-07-19 ENCOUNTER — ANESTHESIA EVENT (OUTPATIENT)
Dept: LABOR AND DELIVERY | Age: 32
End: 2022-07-19
Payer: COMMERCIAL

## 2022-07-19 ENCOUNTER — ANESTHESIA (OUTPATIENT)
Dept: LABOR AND DELIVERY | Age: 32
End: 2022-07-19
Payer: COMMERCIAL

## 2022-07-19 VITALS — SYSTOLIC BLOOD PRESSURE: 119 MMHG | BODY MASS INDEX: 30.23 KG/M2 | WEIGHT: 160 LBS | DIASTOLIC BLOOD PRESSURE: 69 MMHG

## 2022-07-19 DIAGNOSIS — Z34.02 ENCOUNTER FOR SUPERVISION OF NORMAL FIRST PREGNANCY IN SECOND TRIMESTER: ICD-10-CM

## 2022-07-19 DIAGNOSIS — Z3A.37 37 WEEKS GESTATION OF PREGNANCY: Primary | ICD-10-CM

## 2022-07-19 PROBLEM — O44.00 PLACENTA PREVIA AFFECTING DELIVERY: Status: ACTIVE | Noted: 2022-07-19

## 2022-07-19 LAB
ABO + RH BLD: NORMAL
ALBUMIN SERPL-MCNC: 2.8 G/DL (ref 3.5–5)
ALBUMIN/GLOB SERPL: 0.8 {RATIO} (ref 1.1–2.2)
ALP SERPL-CCNC: 191 U/L (ref 45–117)
ALT SERPL-CCNC: 10 U/L (ref 12–78)
ANION GAP SERPL CALC-SCNC: 11 MMOL/L (ref 5–15)
AST SERPL-CCNC: 14 U/L (ref 15–37)
BACTERIA SPEC CULT: NORMAL
BASOPHILS # BLD: 0 K/UL (ref 0–0.1)
BASOPHILS NFR BLD: 0 % (ref 0–1)
BILIRUB SERPL-MCNC: 0.3 MG/DL (ref 0.2–1)
BLOOD GROUP ANTIBODIES SERPL: NORMAL
BUN SERPL-MCNC: 12 MG/DL (ref 6–20)
BUN/CREAT SERPL: 26 (ref 12–20)
CALCIUM SERPL-MCNC: 8.9 MG/DL (ref 8.5–10.1)
CHLORIDE SERPL-SCNC: 108 MMOL/L (ref 97–108)
CO2 SERPL-SCNC: 20 MMOL/L (ref 21–32)
CREAT SERPL-MCNC: 0.47 MG/DL (ref 0.55–1.02)
DIFFERENTIAL METHOD BLD: ABNORMAL
EOSINOPHIL # BLD: 0 K/UL (ref 0–0.4)
EOSINOPHIL NFR BLD: 0 % (ref 0–7)
ERYTHROCYTE [DISTWIDTH] IN BLOOD BY AUTOMATED COUNT: 15.8 % (ref 11.5–14.5)
GLOBULIN SER CALC-MCNC: 3.5 G/DL (ref 2–4)
GLUCOSE BLD STRIP.AUTO-MCNC: 63 MG/DL (ref 65–117)
GLUCOSE BLD STRIP.AUTO-MCNC: 95 MG/DL (ref 65–117)
GLUCOSE SERPL-MCNC: 61 MG/DL (ref 65–100)
HCT VFR BLD AUTO: 37.4 % (ref 35–47)
HGB BLD-MCNC: 12.5 G/DL (ref 11.5–16)
IMM GRANULOCYTES # BLD AUTO: 0 K/UL (ref 0–0.04)
IMM GRANULOCYTES NFR BLD AUTO: 1 % (ref 0–0.5)
LYMPHOCYTES # BLD: 1.5 K/UL (ref 0.8–3.5)
LYMPHOCYTES NFR BLD: 19 % (ref 12–49)
MCH RBC QN AUTO: 31.7 PG (ref 26–34)
MCHC RBC AUTO-ENTMCNC: 33.4 G/DL (ref 30–36.5)
MCV RBC AUTO: 94.9 FL (ref 80–99)
MONOCYTES # BLD: 0.8 K/UL (ref 0–1)
MONOCYTES NFR BLD: 10 % (ref 5–13)
NEUTS SEG # BLD: 5.6 K/UL (ref 1.8–8)
NEUTS SEG NFR BLD: 70 % (ref 32–75)
NRBC # BLD: 0 K/UL (ref 0–0.01)
NRBC BLD-RTO: 0 PER 100 WBC
PLATELET # BLD AUTO: 161 K/UL (ref 150–400)
PMV BLD AUTO: 10.7 FL (ref 8.9–12.9)
POTASSIUM SERPL-SCNC: 3.6 MMOL/L (ref 3.5–5.1)
PROT SERPL-MCNC: 6.3 G/DL (ref 6.4–8.2)
RBC # BLD AUTO: 3.94 M/UL (ref 3.8–5.2)
SERVICE CMNT-IMP: ABNORMAL
SERVICE CMNT-IMP: NORMAL
SERVICE CMNT-IMP: NORMAL
SODIUM SERPL-SCNC: 139 MMOL/L (ref 136–145)
SPECIMEN EXP DATE BLD: NORMAL
WBC # BLD AUTO: 7.9 K/UL (ref 3.6–11)

## 2022-07-19 PROCEDURE — 85025 COMPLETE CBC W/AUTO DIFF WBC: CPT

## 2022-07-19 PROCEDURE — 65410000002 HC RM PRIVATE OB

## 2022-07-19 PROCEDURE — 74011250636 HC RX REV CODE- 250/636: Performed by: OBSTETRICS & GYNECOLOGY

## 2022-07-19 PROCEDURE — 74011250636 HC RX REV CODE- 250/636: Performed by: NURSE ANESTHETIST, CERTIFIED REGISTERED

## 2022-07-19 PROCEDURE — 0502F SUBSEQUENT PRENATAL CARE: CPT | Performed by: OBSTETRICS & GYNECOLOGY

## 2022-07-19 PROCEDURE — 80053 COMPREHEN METABOLIC PANEL: CPT

## 2022-07-19 PROCEDURE — 75410000003 HC RECOV DEL/VAG/CSECN EA 0.5 HR: Performed by: OBSTETRICS & GYNECOLOGY

## 2022-07-19 PROCEDURE — 59510 CESAREAN DELIVERY: CPT | Performed by: OBSTETRICS & GYNECOLOGY

## 2022-07-19 PROCEDURE — 76060000078 HC EPIDURAL ANESTHESIA: Performed by: OBSTETRICS & GYNECOLOGY

## 2022-07-19 PROCEDURE — 76010000392 HC C SECN EA ADDL 0.5 HR: Performed by: OBSTETRICS & GYNECOLOGY

## 2022-07-19 PROCEDURE — 74011250636 HC RX REV CODE- 250/636: Performed by: ANESTHESIOLOGY

## 2022-07-19 PROCEDURE — 86900 BLOOD TYPING SEROLOGIC ABO: CPT

## 2022-07-19 PROCEDURE — 74011000250 HC RX REV CODE- 250: Performed by: ANESTHESIOLOGY

## 2022-07-19 PROCEDURE — 74011000250 HC RX REV CODE- 250: Performed by: OBSTETRICS & GYNECOLOGY

## 2022-07-19 PROCEDURE — 76010000391 HC C SECN FIRST 1 HR: Performed by: OBSTETRICS & GYNECOLOGY

## 2022-07-19 PROCEDURE — 82962 GLUCOSE BLOOD TEST: CPT

## 2022-07-19 RX ORDER — SODIUM CHLORIDE, SODIUM LACTATE, POTASSIUM CHLORIDE, CALCIUM CHLORIDE 600; 310; 30; 20 MG/100ML; MG/100ML; MG/100ML; MG/100ML
INJECTION, SOLUTION INTRAVENOUS
Status: DISCONTINUED | OUTPATIENT
Start: 2022-07-19 | End: 2022-07-19 | Stop reason: HOSPADM

## 2022-07-19 RX ORDER — OXYTOCIN/RINGER'S LACTATE 30/500 ML
87.3 PLASTIC BAG, INJECTION (ML) INTRAVENOUS AS NEEDED
Status: DISCONTINUED | OUTPATIENT
Start: 2022-07-19 | End: 2022-07-22 | Stop reason: HOSPADM

## 2022-07-19 RX ORDER — IBUPROFEN 400 MG/1
800 TABLET ORAL EVERY 8 HOURS
Status: DISCONTINUED | OUTPATIENT
Start: 2022-07-19 | End: 2022-07-22 | Stop reason: HOSPADM

## 2022-07-19 RX ORDER — DOCUSATE SODIUM 100 MG/1
100 CAPSULE, LIQUID FILLED ORAL
Status: DISCONTINUED | OUTPATIENT
Start: 2022-07-19 | End: 2022-07-22 | Stop reason: HOSPADM

## 2022-07-19 RX ORDER — SODIUM CHLORIDE, SODIUM LACTATE, POTASSIUM CHLORIDE, CALCIUM CHLORIDE 600; 310; 30; 20 MG/100ML; MG/100ML; MG/100ML; MG/100ML
125 INJECTION, SOLUTION INTRAVENOUS CONTINUOUS
Status: DISCONTINUED | OUTPATIENT
Start: 2022-07-19 | End: 2022-07-21

## 2022-07-19 RX ORDER — SODIUM CHLORIDE 0.9 % (FLUSH) 0.9 %
5-40 SYRINGE (ML) INJECTION AS NEEDED
Status: DISCONTINUED | OUTPATIENT
Start: 2022-07-19 | End: 2022-07-22 | Stop reason: HOSPADM

## 2022-07-19 RX ORDER — HYDROCORTISONE 1 %
CREAM (GRAM) TOPICAL AS NEEDED
Status: DISCONTINUED | OUTPATIENT
Start: 2022-07-19 | End: 2022-07-22 | Stop reason: HOSPADM

## 2022-07-19 RX ORDER — KETOROLAC TROMETHAMINE 30 MG/ML
INJECTION, SOLUTION INTRAMUSCULAR; INTRAVENOUS AS NEEDED
Status: DISCONTINUED | OUTPATIENT
Start: 2022-07-19 | End: 2022-07-19 | Stop reason: HOSPADM

## 2022-07-19 RX ORDER — ACETAMINOPHEN 325 MG/1
650 TABLET ORAL
Status: DISCONTINUED | OUTPATIENT
Start: 2022-07-19 | End: 2022-07-22 | Stop reason: HOSPADM

## 2022-07-19 RX ORDER — KETOROLAC TROMETHAMINE 30 MG/ML
30 INJECTION, SOLUTION INTRAMUSCULAR; INTRAVENOUS
Status: DISCONTINUED | OUTPATIENT
Start: 2022-07-19 | End: 2022-07-22 | Stop reason: HOSPADM

## 2022-07-19 RX ORDER — SIMETHICONE 80 MG
80 TABLET,CHEWABLE ORAL
Status: DISCONTINUED | OUTPATIENT
Start: 2022-07-19 | End: 2022-07-22 | Stop reason: HOSPADM

## 2022-07-19 RX ORDER — ONDANSETRON 2 MG/ML
4 INJECTION INTRAMUSCULAR; INTRAVENOUS
Status: DISCONTINUED | OUTPATIENT
Start: 2022-07-19 | End: 2022-07-22 | Stop reason: HOSPADM

## 2022-07-19 RX ORDER — DIPHENHYDRAMINE HCL 25 MG
25 CAPSULE ORAL
Status: DISCONTINUED | OUTPATIENT
Start: 2022-07-19 | End: 2022-07-22 | Stop reason: HOSPADM

## 2022-07-19 RX ORDER — AMMONIA 15 % (W/V)
1 AMPUL (EA) INHALATION AS NEEDED
Status: DISCONTINUED | OUTPATIENT
Start: 2022-07-19 | End: 2022-07-22 | Stop reason: HOSPADM

## 2022-07-19 RX ORDER — OXYTOCIN/RINGER'S LACTATE 30/500 ML
10 PLASTIC BAG, INJECTION (ML) INTRAVENOUS AS NEEDED
Status: DISCONTINUED | OUTPATIENT
Start: 2022-07-19 | End: 2022-07-22 | Stop reason: HOSPADM

## 2022-07-19 RX ORDER — ONDANSETRON 2 MG/ML
INJECTION INTRAMUSCULAR; INTRAVENOUS AS NEEDED
Status: DISCONTINUED | OUTPATIENT
Start: 2022-07-19 | End: 2022-07-19 | Stop reason: HOSPADM

## 2022-07-19 RX ORDER — NALBUPHINE HYDROCHLORIDE 20 MG/ML
10 INJECTION, SOLUTION INTRAMUSCULAR; INTRAVENOUS; SUBCUTANEOUS
Status: DISCONTINUED | OUTPATIENT
Start: 2022-07-19 | End: 2022-07-22 | Stop reason: HOSPADM

## 2022-07-19 RX ORDER — HYDROCODONE BITARTRATE AND ACETAMINOPHEN 5; 325 MG/1; MG/1
1 TABLET ORAL
Status: DISCONTINUED | OUTPATIENT
Start: 2022-07-19 | End: 2022-07-22 | Stop reason: HOSPADM

## 2022-07-19 RX ORDER — BUPIVACAINE HYDROCHLORIDE 5 MG/ML
INJECTION, SOLUTION EPIDURAL; INTRACAUDAL
Status: COMPLETED | OUTPATIENT
Start: 2022-07-19 | End: 2022-07-19

## 2022-07-19 RX ORDER — SODIUM CHLORIDE 0.9 % (FLUSH) 0.9 %
5-40 SYRINGE (ML) INJECTION EVERY 8 HOURS
Status: DISCONTINUED | OUTPATIENT
Start: 2022-07-19 | End: 2022-07-21

## 2022-07-19 RX ORDER — MORPHINE SULFATE 10 MG/ML
6 INJECTION, SOLUTION INTRAMUSCULAR; INTRAVENOUS
Status: ACTIVE | OUTPATIENT
Start: 2022-07-19 | End: 2022-07-20

## 2022-07-19 RX ORDER — SODIUM CHLORIDE, SODIUM LACTATE, POTASSIUM CHLORIDE, CALCIUM CHLORIDE 600; 310; 30; 20 MG/100ML; MG/100ML; MG/100ML; MG/100ML
100 INJECTION, SOLUTION INTRAVENOUS CONTINUOUS
Status: DISCONTINUED | OUTPATIENT
Start: 2022-07-19 | End: 2022-07-21

## 2022-07-19 RX ORDER — OXYTOCIN/RINGER'S LACTATE 30/500 ML
PLASTIC BAG, INJECTION (ML) INTRAVENOUS
Status: DISCONTINUED | OUTPATIENT
Start: 2022-07-19 | End: 2022-07-19 | Stop reason: HOSPADM

## 2022-07-19 RX ORDER — MORPHINE SULFATE 10 MG/ML
10 INJECTION, SOLUTION INTRAMUSCULAR; INTRAVENOUS
Status: ACTIVE | OUTPATIENT
Start: 2022-07-19 | End: 2022-07-20

## 2022-07-19 RX ORDER — ZOLPIDEM TARTRATE 5 MG/1
10 TABLET ORAL
Status: DISCONTINUED | OUTPATIENT
Start: 2022-07-19 | End: 2022-07-22 | Stop reason: HOSPADM

## 2022-07-19 RX ORDER — MORPHINE SULFATE 0.5 MG/ML
INJECTION, SOLUTION EPIDURAL; INTRATHECAL; INTRAVENOUS
Status: COMPLETED | OUTPATIENT
Start: 2022-07-19 | End: 2022-07-19

## 2022-07-19 RX ADMIN — KETOROLAC TROMETHAMINE 30 MG: 30 INJECTION, SOLUTION INTRAMUSCULAR; INTRAVENOUS at 18:22

## 2022-07-19 RX ADMIN — WATER 2 G: 1 INJECTION INTRAMUSCULAR; INTRAVENOUS; SUBCUTANEOUS at 17:30

## 2022-07-19 RX ADMIN — ONDANSETRON HYDROCHLORIDE 4 MG: 2 SOLUTION INTRAMUSCULAR; INTRAVENOUS at 23:42

## 2022-07-19 RX ADMIN — SODIUM CHLORIDE 40 MCG/MIN: 9 INJECTION, SOLUTION INTRAVENOUS at 17:26

## 2022-07-19 RX ADMIN — SODIUM CHLORIDE, POTASSIUM CHLORIDE, SODIUM LACTATE AND CALCIUM CHLORIDE: 600; 310; 30; 20 INJECTION, SOLUTION INTRAVENOUS at 17:03

## 2022-07-19 RX ADMIN — Medication 0.25 MG: at 17:33

## 2022-07-19 RX ADMIN — SODIUM CHLORIDE, POTASSIUM CHLORIDE, SODIUM LACTATE AND CALCIUM CHLORIDE 125 ML/HR: 600; 310; 30; 20 INJECTION, SOLUTION INTRAVENOUS at 17:05

## 2022-07-19 RX ADMIN — SODIUM CHLORIDE, POTASSIUM CHLORIDE, SODIUM LACTATE AND CALCIUM CHLORIDE: 600; 310; 30; 20 INJECTION, SOLUTION INTRAVENOUS at 18:25

## 2022-07-19 RX ADMIN — SODIUM CHLORIDE, POTASSIUM CHLORIDE, SODIUM LACTATE AND CALCIUM CHLORIDE 999 ML/HR: 600; 310; 30; 20 INJECTION, SOLUTION INTRAVENOUS at 16:00

## 2022-07-19 RX ADMIN — Medication 909 ML/HR: at 17:56

## 2022-07-19 RX ADMIN — BUPIVACAINE HYDROCHLORIDE 9 MG: 5 INJECTION, SOLUTION EPIDURAL; INTRACAUDAL; PERINEURAL at 17:33

## 2022-07-19 RX ADMIN — ONDANSETRON HYDROCHLORIDE 4 MG: 2 INJECTION, SOLUTION INTRAMUSCULAR; INTRAVENOUS at 17:26

## 2022-07-19 NOTE — L&D DELIVERY NOTE
Delivery Summary    Patient: Jackye Favre MRN: [id number]  SSN: [id number]    YOB: 1990  Age: 34 y.o. Sex: female        Information for the patient's :  Sonam Avalos [[id number]]       Labor Events:    Labor: No    Steroids: None   Cervical Ripening Date/Time:       Cervical Ripening Type: None   Antibiotics During Labor: No   Rupture Identifier:      Rupture Date/Time:       Rupture Type: AROM   Amniotic Fluid Volume:      Amniotic Fluid Description:      Amniotic Fluid Odor:      Induction: None       Induction Date/Time:        Indications for Induction:      Augmentation: None   Augmentation Date/Time:      Indications for Augmentation:     Labor complications: Additional complications:        Delivery Events:  Indications For Episiotomy:     Episiotomy: None   Perineal Laceration(s): None   Repaired:     Periurethral Laceration Location:      Repaired:     Labial Laceration Location:     Repaired:     Sulcal Laceration Location:     Repaired:     Vaginal Laceration Location:     Repaired:     Cervical Laceration Location:     Repaired:     Repair Suture: None   Number of Repair Packets:     Estimated Blood Loss (ml):  ml   Quantitaive Blood Loss (ml):             Delivery Date: 2022    Delivery Time: 5:55 PM   Delivery Type: , Low Transverse     Details    Trial of Labor: No   Primary/Repeat: Primary   Priority: Routine   Indications: Other (Add Comments) placenta previa  bicornuate uterus     Sex:  Female     Gestational Age: 42w2d  Delivery Clinician:  Renuka Galicia  Living Status: Living   Delivery Location: L&D  OR OR 1          APGARS  One minute Five minutes Ten minutes   Skin color: 1   1        Heart rate: 2   2        Grimace: 2   2        Muscle tone: 2   2        Breathin   2        Totals: 9   9          Presentation:      Position:        Resuscitation Method:  Suctioning-bulb; Tactile Stimulation     Meconium Stained: Cord Information:    Complications:    Cord around:    Delayed cord clamping? Cord clamped date/time:   Disposition of Cord Blood: Lab    Blood Gases Sent?:      Placenta:  Date/Time:    Removal:        Appearance:        Measurements:  Birth Weight:        Birth Length:        Head Circumference:        Chest Circumference:       Abdominal Girth: Other Providers:   Ray Jimenez;;;;;;;, Obstetrician;Primary Nurse;Primary [place] Nurse;Nicu Nurse;Neonatologist;Anesthesiologist;Crna;Nurse Practitioner             Group B Strep:   Lab Results   Component Value Date/Time    GrDAYSItreernestina, External negative 2022 12:00 AM     Information for the patient's :  Rosiland Mcburney [[id number]]   No results found for: ABORH, PCTABR, PCTDIG, BILI, ABORHEXT, ABORH     No results for input(s): PCO2CB, PO2CB, HCO3I, SO2I, IBD, PTEMPI, SPECTI, PHICB, ISITE, IDEV, IALLEN in the last 72 hours.

## 2022-07-19 NOTE — ANESTHESIA POSTPROCEDURE EVALUATION
Procedure(s):   SECTION. spinal    Anesthesia Post Evaluation        Patient location during evaluation: PACU  Patient participation: complete - patient participated  Level of consciousness: awake and alert  Pain management: adequate  Airway patency: patent  Anesthetic complications: no  Cardiovascular status: acceptable  Respiratory status: acceptable  Hydration status: acceptable  Comments: I have seen and evaluated the patient and is ready for discharge. Kat Akins MD    Post anesthesia nausea and vomiting:  none      INITIAL Post-op Vital signs:   Vitals Value Taken Time   /86 22 1835   Temp 36.4 °C (97.6 °F) 22 1835   Pulse 67 22 1835   Resp 16 22 1835   SpO2 95 % 22 1838   Vitals shown include unvalidated device data.

## 2022-07-19 NOTE — PROGRESS NOTES
1940 Bedside report received from LISSETH Landon    2010 pericare given no complaint of pain. Experiencing some itching declines medication. 2040 TRANSFER - OUT REPORT:    Verbal report given to ALEJANDRO Borges Rn(name) on [facility]  being transferred to [facility](unit) for routine progression of care       Report consisted of patients Situation, Background, Assessment and   Recommendations(SBAR). Information from the following report(s) SBAR, Intake/Output, MAR and Med Rec Status was reviewed with the receiving nurse. Lines:   Peripheral IV 07/19/22 Left Wrist (Active)        Opportunity for questions and clarification was provided. Patient transported via regular hospital bed in stable condition with belongings and baby transferred with mom in stable condition.

## 2022-07-19 NOTE — H&P
History & Physical    Name: Marce Chávez MRN: [id number]  SSN: [id number]    YOB: 1990  Age: 34 y.o. Sex: female        Subjective:     Estimated Date of Delivery: 22  OB History        3    Para        Term                AB   2    Living           SAB   2    IAB        Ectopic        Molar        Multiple        Live Births                    Ms. Charissa Dutton is admitted with pregnancy at 42w2d for primary  Section. Prenatal course as below; US today confirms persistent previa. She's been asx. Please see prenatal records for details. Patient Active Problem List    Diagnosis    Placenta previa affecting delivery    Encounter for supervision of normal first pregnancy in second trimester     Primary Provider:  Krystyna MONCADA  EDC by LMP/ US   [place]   Covid vaccinated; Covid + 22, low dose aspirin; 36wk US nl  LLP still just covering cervical os 37wk  Bicornuate uterus per report  Congenitally absent right kidney; normal left  Gestational diabetes; metformin QHS; admits to not taking during Covid   IOB labs: O+ WNL. UTI, TONI-normal  Genetic Screening: panorama low risk,Having a little girl  Anatomy:  normal  Flu:__ TDAP: 22  Rhogam: Opositive  GBS: Negative per BS lab on , report coming  Circ:          No specialty comments available. Past Medical History:   Diagnosis Date    Diabetes ([address]     Yes. Gestational on Metformin    Gestational diabetes     Kidney congenitally absent, right     Only left kidney present.  Enlarged good function     Past Surgical History:   Procedure Laterality Date    HX OTHER SURGICAL  2007    [place] teeth extraction     Social History     Occupational History    Not on file   Tobacco Use    Smoking status: Never Smoker    Smokeless tobacco: Never Used   Vaping Use    Vaping Use: Never used   Substance and Sexual Activity    Alcohol use: Not Currently    Drug use: Never    Sexual activity: Yes     Partners: Female     Birth control/protection: None     No family history on file. Allergies   Allergen Reactions    [place] Swelling    Soy Itching     Prior to Admission medications    Medication Sig Start Date End Date Taking? Authorizing Provider   metFORMIN ER (GLUCOPHAGE XR) 500 mg tablet Take 1 Tablet by mouth daily (with dinner). Patient taking differently: Take 1,000 mg by mouth daily (with dinner). Indications: Gest diabetes 1/25/42  Yes Lauri Loredo MD   PNV OK.51/HKJQUJI fum/folic ac (PRENATAL PO) Take  by mouth. Yes Provider, Historical   Blood-Glucose Meter monitoring kit Use as directed to check blood glucose levels 4 times daily: fasting and 1 hour after each meal 3/16/64   Lauri Loredo MD   glucose blood VI test strips (ASCENSIA AUTODISC VI, ONE TOUCH ULTRA TEST VI) strip Use as directed to check blood glucose levels 4 times daily: fasting and 1 hour after each meal 7/01/06   Lauri Loredo MD   lancets misc Use as directed to check blood glucose levels 4 times daily: fasting and 1 hour after each meal 5/61/22   Lauri Loredo MD        Review of Systems: A comprehensive review of systems was negative except for that written in the HPI. Objective:     Vitals:  Vitals:    07/19/22 1239 07/19/22 1553 07/19/22 1614   BP:  124/69    Pulse:  70    Temp:  98.3 °F (36.8 °C)    SpO2:  97%    Weight: 160 lb (72.6 kg)  160 lb (72.6 kg)   Height:   5' 2\" (1.575 m)        Physical Exam:  Patient without distress.   Heart: Regular rate and rhythm  Lung: clear to auscultation throughout lung fields, no wheezes, no rales, no rhonchi and normal respiratory effort  Abdomen: soft, nontender  Membranes:  Intact  Fetal Heart Rate: Reactive  Accelerations: yes    Prenatal Labs:   Lab Results   Component Value Date/Time    Rubella, External immune 02/14/2022 12:00 AM    GrBStrep, External negative 07/14/2022 12:00 AM    HBsAg, External negative 02/14/2022 12:00 AM    HIV, External non reactive 02/14/2022 12:00 AM    Gonorrhea, External negative 02/14/2022 12:00 AM    Chlamydia, External negative 02/14/2022 12:00 AM        Assessment/Plan:     Plan: Admit for Reassuring fetal status. Group B Strep was negative. Prep for PLTCS. Accucheck on admission 66; ok to have small amount of po gatorade. 2g ancef preop. Patient was fully  counseled concerning risks of surgery include bleeding, transfusion, infection, readmission, abscess drainage, injury to abdominal organs including bowel, bladder, ureters, vessels, nerves, need for additional surgery, injury may not be recognized at time of surgery, and risk of death.            Signed By:  Irby Gilford, MD     July 19, 2022

## 2022-07-19 NOTE — ANESTHESIA PREPROCEDURE EVALUATION
Relevant Problems   No relevant active problems       Anesthetic History   No history of anesthetic complications            Review of Systems / Medical History  Patient summary reviewed, nursing notes reviewed and pertinent labs reviewed    Pulmonary  Within defined limits                 Neuro/Psych   Within defined limits           Cardiovascular  Within defined limits                     GI/Hepatic/Renal  Within defined limits              Endo/Other  Within defined limits        Pertinent negatives: Anemia: solitary kidney.    Other Findings   Comments: Solitary kidney                Anesthetic Plan    ASA: 2  Anesthesia type: spinal          Induction: Intravenous  Anesthetic plan and risks discussed with: Patient - Home Methadone ([employer] Reference #: [id number], last prescribed on 9/20 but Dr. Pastora Cantu) and Gabapentin 300mg TID.

## 2022-07-19 NOTE — OP NOTES
Primary  Operative Note    Name: Quintin Ray   Medical Record Number: [id number]   YOB: 1990  Today's Date: 2022      Pre-operative Diagnosis: Placenta Previa, bicorunuate uterus; gestational DM;  EDC: 22    Post-operative Diagnosis: same, but delivered     Estimated Blood Loss: 1000cc    Operation: Low Cervical Transverse Procedure(s):   SECTION    Surgeon(s):  Angy Capone MD    Anesthesia: Spinal    Prophylactic Antibiotics: Ancef  DVT Prophylaxis: Sequential Compression Devices         Fetal Description: beltran     Birth Information:   Information for the patient's :  Hossein Read [[id number]]   Delivery of a   female infant on 2022 at 5:55 PM. Apgars were 9  and 9 . Umbilical Cord:       Umbilical Cord Events:       Placenta:   removal with   appearance. Amniotic Fluid Volume:        Amniotic Fluid Description:                               Complications:  none  INDICATION:  34 y.o.  at 37plus weeks with placenta previa; as above  Procedure Detail:      After proper patient identification and consent,  the patient was taken to the operating room, where spinal anesthesia was administered and found to be adequate. Almanza catheter had been placed using sterile technique. The patient was prepped and draped in the normal sterile fashion. The abdomen was entered using the Pfannenstiel technique. The peritoneum was entered sharply well superior to the bladder without any apparent injury. The bladder flap was created without difficulty. Bicornuatue uterus noted; baby in right horn. A low transverse uterine incision was made with the scalpel and extended with blunt finger dissection. Amniotomy was performed and the fluid was medium amount clear. The babys head was then delivered without difficulty and atraumatically. The nose and mouth were suctioned. The cord was clamped and cut and the baby was handed off to Nursing staff in attendance. Placenta was then removed from the uterus. The uterus was curettaged with a moist lap pad and cleared of all clots and debris. The uterus was noted to involute well with massage and IV pitocin. The uterine incision was closed with 0 monocryl, double layer  in running locking fashion with good hemostasis assured. The anterior pelvis was irrigated with warm normal saline and excellent  hemostasis was confirmed throughout. The peritoneum  was reapproximated with 2-0 chromic. The fascia was closed with 0 PDS in a running fashion. Good hemostasis was assured. The skin was closed with a 3-0 vicryl subcuticular closure. Dermabond was applied. The patient tolerated the procedure well. Sponge, lap, and needle counts were correct times three and the patient and baby were taken to recovery/postpartum room in stable condition.     Lon Pelayo MD  July 19, 2022  6:24 PM

## 2022-07-19 NOTE — ANESTHESIA PROCEDURE NOTES
Spinal Block    Start time: 7/19/2022 5:30 PM  End time: 7/19/2022 5:33 PM  Performed by: Shauna Castro CRNA  Authorized by: Magdalena Chatterjee MD     Pre-procedure:   Indications: primary anesthetic  Preanesthetic Checklist: patient identified, risks and benefits discussed, anesthesia consent, site marked, patient being monitored, timeout performed and fire risk safety assessment completed and verbalized    Timeout Time: 17:30 EDT          Spinal Block:   Patient Position:  Seated  Prep Region:  Lumbar  Prep: Betadine      Location:  L3-4  Technique:  Single shot  Local: morphine (PF) (DURAMORPH;ASTRAMORPH) 0.5 mg/mL injection, 0.25 mg  bupivacaine (PF) (MARCAINE) 0.5 % (5 mg/mL) intrathecal, 9 mg    Med Admin Time: 7/19/2022 5:33 PM    Needle:   Needle Type:  Pencil-tip  Needle Gauge:  25 G  Attempts:  1      Events: CSF confirmed        Assessment:  Insertion:  Uncomplicated  Patient tolerance:  Patient tolerated the procedure well with no immediate complications

## 2022-07-19 NOTE — PROGRESS NOTES
1540 G 3 P 1 pt of Dr Nuvia Galicia admitted to labor and delivery # 15 for scheduled  section for placenta previa. Pt denies pregnancy other pregnancy complications except for gestational diabetes. On metformin  1600 IV and labs  1640 Dr Nuvia Galicia in to see patient. Informed of FS blood sugar. To give juice if OK with anesthesia  1625 FSBS 63  1645 Pt had 8 OZ of apple juice PO. Approved by anesthesia  61 54 78 To OR # 1 for  section  1835 Returned to LDR 12 for recovery.  Baby latched on and nursing    Bedside and Verbal shift change report given to Yeni Morrison RN (oncoming nurse) by Adiel Hamilton RN (offgoing nurse). Report included the following information SBAR, Kardex, MAR, Accordion and Recent Results.

## 2022-07-19 NOTE — PROGRESS NOTES
Patient is doing well   reviewed US findings of persistent previa; recommend PLTCS later today  Good fetal movement

## 2022-07-20 LAB
ERYTHROCYTE [DISTWIDTH] IN BLOOD BY AUTOMATED COUNT: 16.1 % (ref 11.5–14.5)
HCT VFR BLD AUTO: 27.9 % (ref 35–47)
HGB BLD-MCNC: 9.1 G/DL (ref 11.5–16)
MCH RBC QN AUTO: 31.6 PG (ref 26–34)
MCHC RBC AUTO-ENTMCNC: 32.6 G/DL (ref 30–36.5)
MCV RBC AUTO: 96.9 FL (ref 80–99)
NRBC # BLD: 0 K/UL (ref 0–0.01)
NRBC BLD-RTO: 0 PER 100 WBC
PLATELET # BLD AUTO: 137 K/UL (ref 150–400)
PMV BLD AUTO: 10.7 FL (ref 8.9–12.9)
RBC # BLD AUTO: 2.88 M/UL (ref 3.8–5.2)
WBC # BLD AUTO: 8.8 K/UL (ref 3.6–11)

## 2022-07-20 PROCEDURE — 85027 COMPLETE CBC AUTOMATED: CPT

## 2022-07-20 PROCEDURE — 65410000002 HC RM PRIVATE OB

## 2022-07-20 PROCEDURE — 36415 COLL VENOUS BLD VENIPUNCTURE: CPT

## 2022-07-20 PROCEDURE — 74011250636 HC RX REV CODE- 250/636: Performed by: ANESTHESIOLOGY

## 2022-07-20 PROCEDURE — 74011250637 HC RX REV CODE- 250/637: Performed by: OBSTETRICS & GYNECOLOGY

## 2022-07-20 RX ADMIN — Medication 30 MG: at 00:23

## 2022-07-20 RX ADMIN — ACETAMINOPHEN 650 MG: 325 TABLET ORAL at 21:44

## 2022-07-20 RX ADMIN — IBUPROFEN 800 MG: 400 TABLET, FILM COATED ORAL at 19:49

## 2022-07-20 RX ADMIN — Medication 30 MG: at 07:00

## 2022-07-20 RX ADMIN — DOCUSATE SODIUM 100 MG: 100 CAPSULE, LIQUID FILLED ORAL at 20:07

## 2022-07-20 RX ADMIN — Medication 30 MG: at 13:22

## 2022-07-20 NOTE — PROGRESS NOTES
Spiritual Care Assessment/Progress Note  [facility]      Roseanna Sigala      MRN: [id number]  AGE: 34 y.o. SEX: female  Hindu Affiliation: No preference   Language: English     7/20/2022     Total Time (in minutes): 14     Spiritual Assessment begun in [address] through conversation with:         [x]Patient        [x] Family    [] Friend(s)        Reason for Consult: Initial/Spiritual assessment, patient floor     Spiritual beliefs: (Please include comment if needed)     [x] Identifies with a malorie tradition:         [] Supported by a malorie community:            [] Claims no spiritual orientation:           [] Seeking spiritual identity:                [] Adheres to an individual form of spirituality:           [] Not able to assess:                           Identified resources for coping:      [x] Prayer                               [] Music                  [] Guided Imagery     [x] Family/friends                 [] Pet visits     [] Devotional reading                         [] Unknown     [] Other:                                               Interventions offered during this visit: (See comments for more details)    Patient Interventions: Affirmation of emotions/emotional suffering, Affirmation of malorie, Catharsis/review of pertinent events in supportive environment, Coping skills reviewed/reinforced, Normalization of emotional/spiritual concerns, Prayer (assurance of)     Family/Friend(s):  Affirmation of emotions/emotional suffering, Affirmation of malorie, Catharsis/review of pertinent events in supportive environment, Coping skills reviewed/reinforced, Normalization of emotional/spiritual concerns, Prayer (assurance of)     Plan of Care:     [] Support spiritual and/or cultural needs    [] Support AMD and/or advance care planning process      [] Support grieving process   [] Coordinate Rites and/or Rituals    [] Coordination with community clergy   [] No spiritual needs identified at this time   [] Detailed Plan of Care below (See Comments)  [] Make referral to Music Therapy  [] Make referral to Pet Therapy     [] Make referral to Addiction services  [] Make referral to [facility]  [] Make referral to Spiritual Care Partner  [] No future visits requested        [x] Contact Spiritual Care for further referrals     Comments:  for initial visit. Welcomed visit let them know of  support and availability. Provided pastoral listening, support and assurance of prayer. Please contact [phone] for further support.      [address] Elisha Howard, MACE   [phone])

## 2022-07-20 NOTE — ROUTINE PROCESS
TRANSFER - IN REPORT:    Verbal report received from [address] [place] [address] RN(name) on [facility]) for routine progression of care      Report consisted of patients Situation, Background, Assessment and   Recommendations(SBAR). Information from the following report(s) SBAR, Procedure Summary, Intake/Output, Recent Results and Med Rec Status was reviewed with the receiving nurse. Opportunity for questions and clarification was provided. Assessment completed upon patients arrival to unit and care assumed.

## 2022-07-20 NOTE — LACTATION NOTE
This note was copied from a baby's chart. Infant sleepy but learned to latch with open mouth at this feeding. Mother has abundant colostrum baby feeding vigorously with rhythmic suck, swallow, breathe pattern, audible swallowing, and evident milk transfer, both breasts offered, baby is asleep following feeding.

## 2022-07-20 NOTE — PROGRESS NOTES
Post-Operative  Day 1    [facility]     Assessment: Post-Op day 1, stable    Plan:     1. Routine post-operative care  2. Lactation consult today        Information for the patient's :  Tesha Ovalle [[id number]]   , Low Transverse      S:  Patient doing well without significant complaint. Nausea and vomiting resolved, tolerating liquids, no flatus, quinones removed this morning. She has been out of bed once. Nursing but baby having difficulty with latching. Vitals:  Visit Vitals  /66 (BP 1 Location: Right upper arm, BP Patient Position: At rest)   Pulse 66   Temp 98.9 °F (37.2 °C)   Resp 16   Ht 5' 2\" (1.575 m)   Wt 160 lb (72.6 kg)   LMP 10/31/2021 (Exact Date)   SpO2 97%   Breastfeeding Unknown   BMI 29.26 kg/m²     Temp (24hrs), Av.4 °F (36.9 °C), Min:97.6 °F (36.4 °C), Max:99 °F (37.2 °C)      Last 24hr Input/Output:    Intake/Output Summary (Last 24 hours) at 2022 0924  Last data filed at 2022 0630  Gross per 24 hour   Intake 1000 ml   Output 1950 ml   Net -950 ml          Exam:        Patient without distress. Lungs clear. Abdomen, soft, expected tenderness, fundus firm, pfannenstiel incision with overlying dermabond     Perineum normal lochia noted               Lower extremities are negative for swelling, cords or tenderness.     Labs:   Lab Results   Component Value Date/Time    WBC 8.8 2022 06:28 AM    WBC 7.9 2022 04:22 PM    WBC 8.8 2022 09:53 AM    WBC 9.3 2022 02:35 PM    HGB 9.1 (L) 2022 06:28 AM    HGB 12.5 2022 04:22 PM    HGB 12.0 2022 09:53 AM    HGB 13.4 2022 02:35 PM    HCT 27.9 (L) 2022 06:28 AM    HCT 37.4 2022 04:22 PM    HCT 39.5 2022 09:53 AM    HCT 41.2 2022 02:35 PM    PLATELET 977 (L)  06:28 AM    PLATELET 296  04:22 PM    PLATELET 069  09:53 AM    PLATELET 326  02:35 PM       Recent Results (from the past 24 hour(s))   CBC WITH AUTOMATED DIFF    Collection Time: 07/19/22  4:22 PM   Result Value Ref Range    WBC 7.9 3.6 - 11.0 K/uL    RBC 3.94 3.80 - 5.20 M/uL    HGB 12.5 11.5 - 16.0 g/dL    HCT 37.4 35.0 - 47.0 %    MCV 94.9 80.0 - 99.0 FL    MCH 31.7 26.0 - 34.0 PG    MCHC 33.4 30.0 - 36.5 g/dL    RDW 15.8 (H) 11.5 - 14.5 %    PLATELET 460 273 - 522 K/uL    MPV 10.7 8.9 - 12.9 FL    NRBC 0.0 0  WBC    ABSOLUTE NRBC 0.00 0.00 - 0.01 K/uL    NEUTROPHILS 70 32 - 75 %    LYMPHOCYTES 19 12 - 49 %    MONOCYTES 10 5 - 13 %    EOSINOPHILS 0 0 - 7 %    BASOPHILS 0 0 - 1 %    IMMATURE GRANULOCYTES 1 (H) 0.0 - 0.5 %    ABS. NEUTROPHILS 5.6 1.8 - 8.0 K/UL    ABS. LYMPHOCYTES 1.5 0.8 - 3.5 K/UL    ABS. MONOCYTES 0.8 0.0 - 1.0 K/UL    ABS. EOSINOPHILS 0.0 0.0 - 0.4 K/UL    ABS. BASOPHILS 0.0 0.0 - 0.1 K/UL    ABS. IMM. GRANS. 0.0 0.00 - 0.04 K/UL    DF AUTOMATED     METABOLIC PANEL, COMPREHENSIVE    Collection Time: 07/19/22  4:22 PM   Result Value Ref Range    Sodium 139 136 - 145 mmol/L    Potassium 3.6 3.5 - 5.1 mmol/L    Chloride 108 97 - 108 mmol/L    CO2 20 (L) 21 - 32 mmol/L    Anion gap 11 5 - 15 mmol/L    Glucose 61 (L) 65 - 100 mg/dL    BUN 12 6 - 20 MG/DL    Creatinine 0.47 (L) 0.55 - 1.02 MG/DL    BUN/Creatinine ratio 26 (H) 12 - 20      GFR est AA >60 >60 ml/min/1.73m2    GFR est non-AA >60 >60 ml/min/1.73m2    Calcium 8.9 8.5 - 10.1 MG/DL    Bilirubin, total 0.3 0.2 - 1.0 MG/DL    ALT (SGPT) 10 (L) 12 - 78 U/L    AST (SGOT) 14 (L) 15 - 37 U/L    Alk.  phosphatase 191 (H) 45 - 117 U/L    Protein, total 6.3 (L) 6.4 - 8.2 g/dL    Albumin 2.8 (L) 3.5 - 5.0 g/dL    Globulin 3.5 2.0 - 4.0 g/dL    A-G Ratio 0.8 (L) 1.1 - 2.2     GLUCOSE, POC    Collection Time: 07/19/22  4:23 PM   Result Value Ref Range    Glucose (POC) 63 (L) 65 - 117 mg/dL    Performed by [address]    Collection Time: 07/19/22  4:24 PM   Result Value Ref Range    Crossmatch Expiration 07/22/2022,2359     ABO/Rh(D) Raman Becerril POSITIVE     Antibody screen NEG    GLUCOSE, POC    Collection Time: 07/19/22  5:28 PM   Result Value Ref Range    Glucose (POC) 95 65 - 117 mg/dL    Performed by Swapnil Suarez    CBC W/O DIFF    Collection Time: 07/20/22  6:28 AM   Result Value Ref Range    WBC 8.8 3.6 - 11.0 K/uL    RBC 2.88 (L) 3.80 - 5.20 M/uL    HGB 9.1 (L) 11.5 - 16.0 g/dL    HCT 27.9 (L) 35.0 - 47.0 %    MCV 96.9 80.0 - 99.0 FL    MCH 31.6 26.0 - 34.0 PG    MCHC 32.6 30.0 - 36.5 g/dL    RDW 16.1 (H) 11.5 - 14.5 %    PLATELET 627 (L) 932 - 400 K/uL    MPV 10.7 8.9 - 12.9 FL    NRBC 0.0 0  WBC    ABSOLUTE NRBC 0.00 0.00 - 0.01 K/uL

## 2022-07-20 NOTE — ROUTINE PROCESS
0730: Bedside and Verbal shift change report given to CANDY Ragland RN (oncoming nurse) by Jefferson Tobias RN  (offgoing nurse). Report included the following information SBAR, Kardex, ED Summary, OR Summary, Procedure Summary, Intake/Output, MAR, and Recent Results.

## 2022-07-21 PROCEDURE — 74011250637 HC RX REV CODE- 250/637: Performed by: OBSTETRICS & GYNECOLOGY

## 2022-07-21 PROCEDURE — 65410000002 HC RM PRIVATE OB

## 2022-07-21 RX ADMIN — ACETAMINOPHEN 650 MG: 325 TABLET ORAL at 11:28

## 2022-07-21 RX ADMIN — IBUPROFEN 800 MG: 400 TABLET, FILM COATED ORAL at 15:15

## 2022-07-21 RX ADMIN — IBUPROFEN 800 MG: 400 TABLET, FILM COATED ORAL at 06:39

## 2022-07-21 RX ADMIN — HYDROCODONE BITARTRATE AND ACETAMINOPHEN 1 TABLET: 5; 325 TABLET ORAL at 15:15

## 2022-07-21 RX ADMIN — ACETAMINOPHEN 650 MG: 325 TABLET ORAL at 06:39

## 2022-07-21 RX ADMIN — ACETAMINOPHEN 650 MG: 325 TABLET ORAL at 01:27

## 2022-07-21 RX ADMIN — HYDROCODONE BITARTRATE AND ACETAMINOPHEN 1 TABLET: 5; 325 TABLET ORAL at 20:06

## 2022-07-21 RX ADMIN — IBUPROFEN 800 MG: 400 TABLET, FILM COATED ORAL at 23:53

## 2022-07-21 NOTE — LACTATION NOTE
This note was copied from a baby's chart. Mom called out asking for assistance with latching. Baby has a small mouth and is not opening very wide. Mom has large, long nipples. We reviewed positioning the baby at the breast and how mom can help baby get a deep latch. We tried for 20 minutes with latching but baby could not open wide enough to latch deeply. I helped mom with hand expression and we gave the baby 15 drops of colostrum on the spoon.

## 2022-07-21 NOTE — DISCHARGE INSTRUCTIONS
Postpartum Support Groups (virtual)  We know that all of us are dealing with a tremendous amount of uncertainty, confusion and disruption to our daily lives, which may result in increased anxiety, depression and fear. If you are feeling unsettled or worse, please know that we are here to help. During this time of increased caution and care for one another, [facility] [place] ([address] [place] [address]) is offering virtual support groups to ALL MOTHERS in [place] regardless of the age of your child/children as a way to help weather this emotional storm together. Social support is an important part of self-care during this time of physical distancing. Virtual postpartum support group meetings available at www. postpartumva.org  Warm Line: 530.934.3273    Breastfeeding Support Groups    and  of each month   and  of each month    [place] at www.[employer] under the \"About Us\" and \"Classes and Events tabs\"          Section: What to Expect at [address] [facility]     A  section, or , is surgery to deliver your baby through a cut that the doctor makes in your lower belly and uterus. The cut is called an incision. You may have some pain in your lower belly and need pain medicine for 1 to 2 weeks. You can expect some vaginal bleeding for several weeks. You will probably need about 6 weeks to fully recover. It's important to take it easy while the incision heals. Avoid heavy lifting, strenuous activities, and exercises that strain the belly muscles while you recover. Ask a family member or friend for help with housework, cooking, and shopping. This care sheet gives you a general idea about how long it will take for you to recover. But each person recovers at a different pace. Follow the steps below to get better as quickly as possible. How can you care for yourself at home? Activity    Rest when you feel tired. Getting enough sleep will help you recover.      Try to walk each day. Start by walking a little more than you did the day before. Bit by bit, increase the amount you walk. Walking boosts blood flow and helps prevent pneumonia, constipation, and blood clots. Avoid strenuous activities, such as bicycle riding, jogging, weightlifting, and aerobic exercise, for 6 weeks or until your doctor says it is okay. Until your doctor says it is okay, do not lift anything heavier than your baby. Do not do sit-ups or other exercises that strain the belly muscles for 6 weeks or until your doctor says it is okay. Hold a pillow over your incision when you cough or take deep breaths. This will support your belly and decrease your pain. You may shower as usual. Pat the incision dry when you are done. You will have some vaginal bleeding. Wear sanitary pads. Do not douche or use tampons until your doctor says it is okay. Ask your doctor when you can drive again. You will probably need to take at least 6 weeks off work. It depends on the type of work you do and how you feel. Ask your doctor when it is okay for you to have sex. Diet    You can eat your normal diet. If your stomach is upset, try bland, low-fat foods like plain rice, broiled chicken, toast, and yogurt. Drink plenty of fluids (unless your doctor tells you not to). You may notice that your bowel movements are not regular right after your surgery. This is common. Try to avoid constipation and straining with bowel movements. You may want to take a fiber supplement every day. If you have not had a bowel movement after a couple of days, ask your doctor about taking a mild laxative. If you are breastfeeding, limit alcohol. Alcohol can cause a lack of energy and other health problems for the baby when a breastfeeding woman drinks heavily. It can also get in the way of a mom's ability to feed her baby or to care for the child in other ways.  There isn't a lot of research about exactly how much alcohol can harm a baby. Having no alcohol is the safest choice for your baby. If you choose to have a drink now and then, have only one drink, and limit the number of occasions that you have a drink. Wait to breastfeed at least 2 hours after you have a drink to reduce the amount of alcohol the baby may get in the milk. Medicines    Your doctor will tell you if and when you can restart your medicines. You will also get instructions about taking any new medicines. If you take aspirin or some other blood thinner, ask your doctor if and when to start taking it again. Make sure that you understand exactly what your doctor wants you to do. Take pain medicines exactly as directed. If the doctor gave you a prescription medicine for pain, take it as prescribed. If you are not taking a prescription pain medicine, ask your doctor if you can take an over-the-counter medicine. If you think your pain medicine is making you sick to your stomach: Take your medicine after meals (unless your doctor has told you not to). Ask your doctor for a different pain medicine. If your doctor prescribed antibiotics, take them as directed. Do not stop taking them just because you feel better. You need to take the full course of antibiotics. Incision care    If you have strips of tape on the incision, leave the tape on for a week or until it falls off. Wash the area daily with warm, soapy water, and pat it dry. Don't use hydrogen peroxide or alcohol, which can slow healing. You may cover the area with a gauze bandage if it weeps or rubs against clothing. Change the bandage every day. Keep the area clean and dry. Other instructions    If you breastfeed your baby, you may be more comfortable while you are healing if you don't rest your baby on your belly. Try tucking your baby under your arm, with your baby's body along the side you will be feeding on. Support your baby's upper body with your arm.  With that hand you can control your baby's head to bring your baby's mouth to your breast. This is sometimes called the football hold. Follow-up care is a key part of your treatment and safety. Be sure to make and go to all appointments, and call your doctor if you are having problems. It's also a good idea to know your test results and keep a list of the medicines you take. When should you call for help? Share this information with your partner, family, or a friend. They can help you watch for warning signs. Call 911  anytime you think you may need emergency care. For example, call if:    You have thoughts of harming yourself, your baby, or another person. You passed out (lost consciousness). You have chest pain, are short of breath, or cough up blood. You have a seizure. Call your doctor now or seek immediate medical care if:    You have loose stitches, or your incision comes open. You have signs of hemorrhage (too much bleeding), such as:  Heavy vaginal bleeding. This means that you are soaking through one or more pads in an hour. Or you pass blood clots bigger than an egg. Feeling dizzy or lightheaded, or you feel like you may faint. Feeling so tired or weak that you cannot do your usual activities. A fast or irregular heartbeat. New or worse belly pain. You have symptoms of infection, such as: Increased pain, swelling, warmth, or redness. Red streaks leading from the incision. Pus draining from the incision. A fever. Vaginal discharge that smells bad. New or worse belly pain. You have symptoms of a blood clot in your leg (called a deep vein thrombosis), such as:  Pain in your calf, back of the knee, thigh, or groin. Redness and swelling in your leg or groin. You have signs of preeclampsia, such as:  Sudden swelling of your face, hands, or feet. New vision problems (such as dimness, blurring, or seeing spots). A severe headache.    Watch closely for changes in your health, and be sure to contact your doctor if:    Your vaginal bleeding isn't decreasing. You feel sad, anxious, or hopeless for more than a few days. You are having problems with your breasts or breastfeeding. Where can you learn more? Go to http://www.cao.com/  Enter M806 in the search box to learn more about \" Section: What to Expect at Home. \"  Current as of: 2021               Content Version: 13.2   Healthwise, [employer]. Care instructions adapted under license by [employer] (which disclaims liability or warranty for this information). If you have questions about a medical condition or this instruction, always ask your healthcare professional. [address] any warranty or liability for your use of this information.

## 2022-07-21 NOTE — ROUTINE PROCESS
Bedside shift change report given to SNOW Peralta (oncoming nurse) by SNOW Vargas (offgoing nurse). Report included the following information SBAR.

## 2022-07-21 NOTE — ROUTINE PROCESS
0750: Bedside and Verbal shift change report given to CANDY Varela RN (oncoming nurse) by SNOW Lyman RN (offgoing nurse). Report included the following information SBAR, Kardex, ED Summary, OR Summary, Procedure Summary, Intake/Output, MAR, and Recent Results.

## 2022-07-21 NOTE — PROGRESS NOTES
Post-Operative  Day 2    [facility]     Assessment: Post-Op day 2, doing well    Plan:   1. Routine post-operative care    Information for the patient's :  Chiquis Edwards [[id number]]   , Low Transverse      S:  Patient doing well without significant complaint. Nausea and vomiting resolved, tolerating food, passing flatus, voiding and ambulating without difficulty. Working on nursing. Vitals:  Visit Vitals  /63 (BP 1 Location: Left upper arm, BP Patient Position: At rest;Semi fowlers)   Pulse 60   Temp 98.6 °F (37 °C)   Resp 16   Ht 5' 2\" (1.575 m)   Wt 160 lb (72.6 kg)   LMP 10/31/2021 (Exact Date)   SpO2 96%   Breastfeeding Unknown   BMI 29.26 kg/m²     Temp (24hrs), Av.6 °F (37 °C), Min:97.9 °F (36.6 °C), Max:99 °F (37.2 °C)        Exam:        Patient without distress. Abdomen, bowel sounds present, soft, expected tenderness, fundus firm                Wound incision clean, dry and intact               Lower extremities are negative for swelling, cords or tenderness. Labs:   Lab Results   Component Value Date/Time    WBC 8.8 2022 06:28 AM    WBC 7.9 2022 04:22 PM    WBC 8.8 2022 09:53 AM    WBC 9.3 2022 02:35 PM    HGB 9.1 (L) 2022 06:28 AM    HGB 12.5 2022 04:22 PM    HGB 12.0 2022 09:53 AM    HGB 13.4 2022 02:35 PM    HCT 27.9 (L) 2022 06:28 AM    HCT 37.4 2022 04:22 PM    HCT 39.5 2022 09:53 AM    HCT 41.2 2022 02:35 PM    PLATELET 136 (L)  06:28 AM    PLATELET 719  04:22 PM    PLATELET 572  09:53 AM    PLATELET 958  02:35 PM       No results found for this or any previous visit (from the past 24 hour(s)).

## 2022-07-21 NOTE — ROUTINE PROCESS
Bedside shift change report given to SNOW Izquierdo RN (oncoming nurse) by Thu Martin RN (offgoing nurse). Report included the following information SBAR, Intake/Output, MAR, and Recent Results.

## 2022-07-21 NOTE — LACTATION NOTE
This note was copied from a baby's chart. Infant had been persistently sleepy but is now wakeful and ready to learn. Baby progressed through steps of learning to open mouth to developing sustained rhythm mother is comfortable, baby feeding vigorously with rhythmic suck, swallow, breathe pattern, audible swallowing, and evident milk transfer, both breasts offered, baby is asleep following feeding.

## 2022-07-21 NOTE — PROGRESS NOTES
Anesthesia Post Operative Day 2    Patient is s/p neuraxial Duramorph for  Section. The patient relates no discomfort, no itching and no nausea. There are no motor/sensation abnormalities and no complaints of a headache. Anesthesia site is normal, without erythema or tenderness. All symptoms were treated with protocol medications with good results. Patient is up and ambulating without complaints. Plan: Continue Duramorph protocol as needed. Reconsult PRN.     Eliot Walton DO  1:44 PM

## 2022-07-22 VITALS
DIASTOLIC BLOOD PRESSURE: 68 MMHG | TEMPERATURE: 98.9 F | HEIGHT: 62 IN | WEIGHT: 160 LBS | HEART RATE: 65 BPM | RESPIRATION RATE: 16 BRPM | SYSTOLIC BLOOD PRESSURE: 103 MMHG | BODY MASS INDEX: 29.44 KG/M2 | OXYGEN SATURATION: 97 %

## 2022-07-22 PROCEDURE — 74011250637 HC RX REV CODE- 250/637: Performed by: OBSTETRICS & GYNECOLOGY

## 2022-07-22 RX ORDER — IBUPROFEN 800 MG/1
800 TABLET ORAL
Qty: 40 TABLET | Refills: 1 | Status: SHIPPED | OUTPATIENT
Start: 2022-07-22 | End: 2022-09-23 | Stop reason: ALTCHOICE

## 2022-07-22 RX ORDER — HYDROCODONE BITARTRATE AND ACETAMINOPHEN 5; 325 MG/1; MG/1
1 TABLET ORAL
Qty: 10 TABLET | Refills: 0 | Status: SHIPPED | OUTPATIENT
Start: 2022-07-22 | End: 2022-07-25

## 2022-07-22 RX ADMIN — IBUPROFEN 800 MG: 400 TABLET, FILM COATED ORAL at 08:10

## 2022-07-22 RX ADMIN — ACETAMINOPHEN 650 MG: 325 TABLET ORAL at 08:10

## 2022-07-22 NOTE — LACTATION NOTE
This note was copied from a baby's chart. Mom and baby scheduled for discharge today. Mom states baby has been nursing well and has improved throughout post partum stay, deep latch maintained, mother is comfortable, milk is in transition, baby feeding vigorously with rhythmic suck, swallow, breathe pattern, with audible swallowing, and evident milk transfer, both breasts offered, baby is asleep following feeding. Baby is feeding on demand. Mom states baby is still struggling some on the left breast but is doing better on the right breast.   Moms milk is coming in and she has been doing some pumping after nursing. We reviewed engorgement and pumping. Breast feeding teaching completed and all questions answered.

## 2022-07-22 NOTE — PROGRESS NOTES
Bedside and Verbal shift change report given to Patricio Grady RN (oncoming nurse) by Kodi King RN (offgoing nurse). Report included the following information SBAR, Procedure Summary, Intake/Output, and Recent Results.

## 2022-07-22 NOTE — PROGRESS NOTES
Post-Operative  Day 3    [facility]       Assessment: Post-Op day 3, doing well    Acute surgical blood loss anemia: discharge home on iron. VS WNL    Plan:   1. Discharge home today  2. Follow up in office in 6 weeks with Manuel Leon MD  3. Post partum activity/wound care advised, diet as tolerated  4. Discharge Medications: ibuprofen, norco, iron, and medications prior to admission      Information for the patient's :  Sonam Avalos [[id number]]   , Low Transverse      S:  Patient doing well without significant complaint. Tolerating diet, passing flatus, voiding and ambulating without difficulty    Vitals:  Visit Vitals  /68 (BP 1 Location: Left upper arm, BP Patient Position: Lying right side)   Pulse 65   Temp 98.9 °F (37.2 °C)   Resp 16   Ht 5' 2\" (1.575 m)   Wt 160 lb (72.6 kg)   LMP 10/31/2021 (Exact Date)   SpO2 97%   Breastfeeding Unknown   BMI 29.26 kg/m²     Temp (24hrs), Av.2 °F (36.8 °C), Min:97.9 °F (36.6 °C), Max:98.9 °F (37.2 °C)        Exam:        Patient without distress. Abdomen, bowel sounds present, soft, expected tenderness, fundus firm                Wound incision clean, dry and intact               Lower extremities are negative for swelling, cords or tenderness. Labs:   Lab Results   Component Value Date/Time    WBC 8.8 2022 06:28 AM    WBC 7.9 2022 04:22 PM    WBC 8.8 2022 09:53 AM    WBC 9.3 2022 02:35 PM    HGB 9.1 (L) 2022 06:28 AM    HGB 12.5 2022 04:22 PM    HGB 12.0 2022 09:53 AM    HGB 13.4 2022 02:35 PM    HCT 27.9 (L) 2022 06:28 AM    HCT 37.4 2022 04:22 PM    HCT 39.5 2022 09:53 AM    HCT 41.2 2022 02:35 PM    PLATELET 015 (L)  06:28 AM    PLATELET 691 10/0144 04:22 PM    PLATELET 454  09:53 AM    PLATELET 859  02:35 PM       No results found for this or any previous visit (from the past 24 hour(s)).

## 2022-07-22 NOTE — DISCHARGE SUMMARY
Obstetrical Discharge Summary     Name: Dg Peoples MRN: [id number]  SSN: [id number]    YOB: 1990  Age: 34 y.o. Sex: female      Admit Date: 2022    Discharge Date: 2022     Admitting Physician: Monie Tesfaye MD     Attending Physician:  Dali Pacheco MD     Admission Diagnoses: Placenta previa affecting delivery [O44.00]    Discharge Diagnoses:   Information for the patient's :  Rekha Reyes [[id number]]   Delivery of a 6 lb 11.6 oz (3.05 kg) female infant via , Low Transverse on 2022 at 8:25 PM  by Monie Tesfaye. Apgars were 9  and 9 . Additional Diagnoses:   Hospital Problems  Date Reviewed: 2022            Codes Class Noted POA    Placenta previa affecting delivery ICD-10-CM: O44.00  ICD-9-CM: 641.11  2022 Unknown          Lab Results   Component Value Date/Time    Rubella, External immune 2022 12:00 AM    GrBStrep, External negative 2022 12:00 AM       Hospital Course: Normal hospital course following the delivery. Disposition at Discharge: Home or self care    Discharged Condition: Stable    Patient Instructions:   Current Discharge Medication List        START taking these medications    Details   HYDROcodone-acetaminophen (NORCO) 5-325 mg per tablet Take 1 Tablet by mouth every six (6) hours as needed for Pain for up to 3 days. Max Daily Amount: 4 Tablets. Qty: 10 Tablet, Refills: 0    Associated Diagnoses: Delivery of pregnancy by  section      ibuprofen (MOTRIN) 800 mg tablet Take 1 Tablet by mouth every eight (8) hours as needed for Pain. Qty: 40 Tablet, Refills: 1           CONTINUE these medications which have NOT CHANGED    Details   PNV UV.25/ORUZLVC fum/folic ac (PRENATAL PO) Take  by mouth.       Blood-Glucose Meter monitoring kit Use as directed to check blood glucose levels 4 times daily: fasting and 1 hour after each meal  Qty: 1 Kit, Refills: 0      glucose blood VI test strips (ASCENSIA AUTODISC VI, ONE TOUCH ULTRA TEST VI) strip Use as directed to check blood glucose levels 4 times daily: fasting and 1 hour after each meal  Qty: 200 Strip, Refills: 1      lancets misc Use as directed to check blood glucose levels 4 times daily: fasting and 1 hour after each meal  Qty: 200 Each, Refills: 1           STOP taking these medications       metFORMIN ER (GLUCOPHAGE XR) 500 mg tablet Comments:   Reason for Stopping:               Reference my discharge instructions.     Follow-up Appointments   Procedures    FOLLOW UP VISIT Appointment in: 6 Weeks     Standing Status:   Standing     Number of Occurrences:   1     Order Specific Question:   Appointment in     Answer:   6 Weeks        Signed By:  Munir Howe MD     July 22, 2022

## 2022-07-22 NOTE — ROUTINE PROCESS
0730: Bedside and Verbal shift change report given to CANDY Mckinney RN (oncoming nurse) by Riley Raman RN (offgoing nurse). Report included the following information SBAR, Kardex, ED Summary, OR Summary, Procedure Summary, Intake/Output, MAR, and Recent Results. 1150: I have reviewed discharge instructions with the patient. The patient verbalized understanding.

## 2022-09-23 ENCOUNTER — OFFICE VISIT (OUTPATIENT)
Dept: OBGYN CLINIC | Age: 32
End: 2022-09-23
Payer: COMMERCIAL

## 2022-09-23 VITALS — BODY MASS INDEX: 26.89 KG/M2 | DIASTOLIC BLOOD PRESSURE: 80 MMHG | WEIGHT: 147 LBS | SYSTOLIC BLOOD PRESSURE: 110 MMHG

## 2022-09-23 PROBLEM — O44.00 PLACENTA PREVIA AFFECTING DELIVERY: Status: RESOLVED | Noted: 2022-07-19 | Resolved: 2022-09-23

## 2022-09-23 PROBLEM — Z34.02 ENCOUNTER FOR SUPERVISION OF NORMAL FIRST PREGNANCY IN SECOND TRIMESTER: Status: RESOLVED | Noted: 2022-02-14 | Resolved: 2022-09-23

## 2022-09-23 PROCEDURE — 0503F POSTPARTUM CARE VISIT: CPT | Performed by: OBSTETRICS & GYNECOLOGY

## 2022-09-23 NOTE — PROGRESS NOTES
Postpartum evaluation    Marce Chávez is a 34 y.o. female who presents for a postpartum exam.     She is now 8 weeks post primary  section due to placenta previa    Her baby is doing well. She has had no menses since delivery. She has had the following significant problems since her delivery: none    The patient is breastfeeding without difficulty. The patient would like to consider use of VCF/ condoms for birth control. She is currently taking: no medications. She is due for her next AE today.       Visit Vitals  /80   Wt 147 lb (66.7 kg)   Breastfeeding Yes   BMI 26.89 kg/m²       PHYSICAL EXAMINATION    Constitutional  Appearance: well-nourished, well developed, alert, in no acute distress    HENT  Head and Face: appears normal    Gastrointestinal  Abdominal Examination: abdomen non-tender to palpation, normal bowel sounds, no masses present  Liver and spleen: no hepatomegaly present, spleen not palpable  Hernias: no hernias identified    Genitourinary  External Genitalia: normal appearance for age, no discharge present, no tenderness present, no inflammatory lesions present, no masses present, no atrophy present  Vagina: normal vaginal vault without central or paravaginal defects, no discharge present, no inflammatory lesions present, no masses present  Bladder: non-tender to palpation  Urethra: appears normal  Cervix: normal   Uterus: normal size, shape and consistency  Adnexa: no adnexal tenderness present, no adnexal masses present  Perineum: perineum within normal limits, no evidence of trauma, no rashes or skin lesions present  Anus: anus within normal limits, no hemorrhoids present  Inguinal Lymph Nodes: no lymphadenopathy present    Skin  General Inspection: no rash, no lesions identified    Neurologic/Psychiatric  Mental Status:  Orientation: grossly oriented to person, place and time  Mood and Affect: mood normal, affect appropriate    Assessment:  Normal postpartum check  Insulin dep diabetes -gestational    Plan:  Recommend PCP fu once weaning complete for diabetic testing  Patient declines presence of chaperone during today's visit. RTO for AE.

## 2022-09-27 LAB
CYTOLOGIST CVX/VAG CYTO: NORMAL
CYTOLOGY CVX/VAG DOC CYTO: NORMAL
CYTOLOGY CVX/VAG DOC THIN PREP: NORMAL
DX ICD CODE: NORMAL
LABCORP, 190119: NORMAL
Lab: NORMAL
OTHER STN SPEC: NORMAL
STAT OF ADQ CVX/VAG CYTO-IMP: NORMAL

## 2023-02-08 ENCOUNTER — OFFICE VISIT (OUTPATIENT)
Dept: OBGYN CLINIC | Age: 33
End: 2023-02-08
Payer: COMMERCIAL

## 2023-02-08 VITALS — DIASTOLIC BLOOD PRESSURE: 81 MMHG | SYSTOLIC BLOOD PRESSURE: 122 MMHG | WEIGHT: 157 LBS | BODY MASS INDEX: 28.72 KG/M2

## 2023-02-08 DIAGNOSIS — L91.0 POSTOPERATIVE KELOID SCAR: Primary | ICD-10-CM

## 2023-02-08 DIAGNOSIS — Z98.890 POSTOPERATIVE KELOID SCAR: Primary | ICD-10-CM

## 2023-02-08 PROCEDURE — 99213 OFFICE O/P EST LOW 20 MIN: CPT | Performed by: OBSTETRICS & GYNECOLOGY

## 2023-02-08 NOTE — PROGRESS NOTES
Royal Moore is a 34 y.o. female who complains of  incisional discomfort and \"increased warmth. \"  Normal urinary and bowel habits. Patient delivered via  in July last year. Still breastfeeding; infant doing well. She is also noting persistent discomfort in right wrist.    Patient reports that the pain has become more apparent this week and it is warm to the touch and there is some redness. Patient denies any leaking and rates the pain on the scale as a 5. Her relevant past medical history:   Past Medical History:   Diagnosis Date    Diabetes ([address]     Yes. Gestational on Metformin    Gestational diabetes     Kidney congenitally absent, right     Only left kidney present. Enlarged good function        Past Surgical History:   Procedure Laterality Date    HX OTHER SURGICAL      [place] teeth extraction     Social History     Occupational History    Not on file   Tobacco Use    Smoking status: Never    Smokeless tobacco: Never   Vaping Use    Vaping Use: Never used   Substance and Sexual Activity    Alcohol use: Not Currently    Drug use: Never    Sexual activity: Yes     Partners: Female     Birth control/protection: None     No family history on file. Allergies   Allergen Reactions    [place] Swelling    Soy Itching     Prior to Admission medications    Medication Sig Start Date End Date Taking? Authorizing Provider   CAL LG.46/RBOWJBN fum/folic ac (PRENATAL PO) Take  by mouth.     Provider, Historical        Review of Systems - History obtained from the patient  Constitutional: negative for weight loss, fever, night sweats  HEENT: negative for hearing loss, earache, congestion, snoring, sorethroat  CV: negative for chest pain, palpitations, edema  Resp: negative for cough, shortness of breath, wheezing  Breast: negative for breast lumps, nipple discharge, galactorrhea  GI: negative for change in bowel habits, abdominal pain, black or bloody stools  : negative for frequency, dysuria, hematuria  MSK: negative for back pain, joint pain, muscle pain  Skin: negative for itching, rash, hives  Neuro: negative for dizziness, headache, confusion, weakness  Psych: negative for anxiety, depression, change in mood  Heme/lymph: negative for bleeding, bruising, pallor      Objective:  Visit Vitals  /81   Wt 157 lb (71.2 kg)   LMP  (LMP Unknown)   Breastfeeding Unknown   BMI 28.72 kg/m²          PHYSICAL EXAMINATION    Constitutional  Appearance: well-nourished, well developed, alert, in no acute distress    HENT  Head and Face: appears normal      Gastrointestinal  Abdominal Examination: abdomen non-tender to palpation, normal bowel sounds, no masses present; incision healing well with minimal keloid  Liver and spleen: no hepatomegaly present, spleen not palpable  Hernias: no hernias identified         Skin  General Inspection: no rash, no lesions identified    Neurologic/Psychiatric  Mental Status:  Orientation: grossly oriented to person, place and time  Mood and Affect: mood normal, affect appropriate    Assessment:    Minimal keloid scar    Plan:   Reassurance offered  Patient declines presence of chaperone during today's visit.

## 2024-07-15 ENCOUNTER — TELEPHONE (OUTPATIENT)
Age: 34
End: 2024-07-15

## 2024-07-15 NOTE — TELEPHONE ENCOUNTER
Patient called in, name and  verified. Patient is currently c/o some mild cramping in the beginning of pregnancy. She is scheduled for her first EOB appt on . She is worried of an ectopic pregnancy. When asked to scale the pain of the cramps, the pt reports \"it's not that bad\". She denies any bleeding. She is just concerned due to her age. I have informed her that before 6 weeks we do not offer US as we are limited to what we can see. We also discussed that mild cramping can be considered common in early pregnancy.      I have informed her that I could run a BETA test by her provider to see if she thought that would be a good idea given her sx. Pt understands and  states she would think about it and call us back. Just a heads up. Please let me know if you desire anything different.

## 2024-08-06 ENCOUNTER — INITIAL PRENATAL (OUTPATIENT)
Age: 34
End: 2024-08-06

## 2024-08-06 VITALS — BODY MASS INDEX: 27.42 KG/M2 | HEIGHT: 62 IN | WEIGHT: 149 LBS

## 2024-08-06 DIAGNOSIS — Z34.90 PREGNANCY, UNSPECIFIED GESTATIONAL AGE: Primary | ICD-10-CM

## 2024-08-06 PROCEDURE — 0500F INITIAL PRENATAL CARE VISIT: CPT | Performed by: OBSTETRICS & GYNECOLOGY

## 2024-08-06 SDOH — ECONOMIC STABILITY: FOOD INSECURITY: WITHIN THE PAST 12 MONTHS, THE FOOD YOU BOUGHT JUST DIDN'T LAST AND YOU DIDN'T HAVE MONEY TO GET MORE.: NEVER TRUE

## 2024-08-06 SDOH — ECONOMIC STABILITY: FOOD INSECURITY: WITHIN THE PAST 12 MONTHS, YOU WORRIED THAT YOUR FOOD WOULD RUN OUT BEFORE YOU GOT MONEY TO BUY MORE.: NEVER TRUE

## 2024-08-06 SDOH — ECONOMIC STABILITY: INCOME INSECURITY: HOW HARD IS IT FOR YOU TO PAY FOR THE VERY BASICS LIKE FOOD, HOUSING, MEDICAL CARE, AND HEATING?: NOT HARD AT ALL

## 2024-08-06 SDOH — ECONOMIC STABILITY: HOUSING INSECURITY
IN THE LAST 12 MONTHS, WAS THERE A TIME WHEN YOU DID NOT HAVE A STEADY PLACE TO SLEEP OR SLEPT IN A SHELTER (INCLUDING NOW)?: NO

## 2024-08-06 ASSESSMENT — PATIENT HEALTH QUESTIONNAIRE - PHQ9
SUM OF ALL RESPONSES TO PHQ9 QUESTIONS 1 & 2: 0
SUM OF ALL RESPONSES TO PHQ QUESTIONS 1-9: 0
1. LITTLE INTEREST OR PLEASURE IN DOING THINGS: NOT AT ALL
SUM OF ALL RESPONSES TO PHQ QUESTIONS 1-9: 0
2. FEELING DOWN, DEPRESSED OR HOPELESS: NOT AT ALL
SUM OF ALL RESPONSES TO PHQ QUESTIONS 1-9: 0
SUM OF ALL RESPONSES TO PHQ QUESTIONS 1-9: 0

## 2024-08-06 NOTE — PROGRESS NOTES
Initial Prenatal Note    CC: Amenorrhea, positive pregnancy test    HPI:  Tyrese King is a 34 y.o.. Who presents for initial prenatal appointment. Since her LMP she has experienced n/v has not treated with anything. She denies dysuria, discharge, vaginal bleeding.    Wants to do genetic screening and wants to know gender. Did carrier screening during her last pregnancy in 2022    Declines Glucola, wants to do finger sticks instead when the time comes     Will do all new ob labs, urine and NIPT    LMP history:  The date of her LMP 6/1/2024 is certain.  Her last menstrual period was normal.    Based on her LMP, her MARCOS is 3/8/2025 and GA is 9W3D     Ultrasound data:  She had an  ultrasound done by the ultrasound tech today which revealed a viable mancilla pregnancy with a gestational age 9w5d and an MARCOS of 3/6/2025    TA ULTRASOUND PERFORMED A SINGLE VIABLE 9W5D WITH MARCOS OF 03/06/2025 IUP IS SEEN WITH NORMAL CARDIAC RHYTHM. GESTATIONAL AGE BASED ON TODAY'S ULTRASOUND. THE UTERUS APPEARS BICORNATE. THE GESTATIONAL SAC APPEARS TO BE LOCATED IN THE FUNDAL PORTION OF THE ENDOMETRIUM OF THE LEFT HORN. THE RIGHT HORNS ENDOMETRIUM MEASURES 15 MM. A NORMAL YOLK SAC IS SEEN. RIGHT OVARY NOT VISUALIZED DUE TO BOWEL GAS. RIGHT ADNEXA APPEARS WNL. LEFT OVARY APPEARS WNL. NO FREE FLUID IS SEEN IN THE CDS      Relevant past pregnancy history:  She has the following pregnancy history:  delivered her daughter at 37 weeks 6lbs 11.6 oz via c/s    Relevant past medical history:   Last Pap: 9/23/2022 NIL    Relevant social history:  Her occupation is:  works in the science field  Her partner's name and occupation is: [employer] also works in science     1. Have you been to the ER, urgent care clinic, or hospitalized since your last visit? no    2. Have you seen or consulted any other health care providers outside of the [facility] since your last visit? no    She declines a chaperone during the gynecologic exam today.      Fidelina 
present  Liver and spleen: no hepatomegaly present, spleen not palpable  Hernias: no hernias identified      Skin  General Inspection: no rash, no lesions identified    Neurologic/Psychiatric  Mental Status:  Orientation: grossly oriented to person, place and time  Mood and Affect: mood normal, affect appropriate      Assessment and Plan:     Intrauterine pregnancy with issues addressed in problem list.  We discussed genetic testing screening options for the pregnancy and offered NIPT and the patient desires.  We discussed carrier screening as per ACOG guidelines for CF, SMA, DMD, and Fragile X syndrome and the patient relays she already had carrier screening performed.    Course of pregnancy discussed including visit schedule, routine U/S, glucola testing, etc.  Avoid alcoholic beverages and illicit/recreational drugs use.  Take prenatal vitamins or folic acid daily.  Hospital and practice style discussed with coverage system.  Discussed nutrition, toxoplasmosis precautions, sexual activity, exercise, need for influenza vaccine, environmental and work hazards, travel advice, screen for domestic violence, need for seat belts.  Discussed seafood, unpasteurized dairy products, deli meat, artificial sweeteners, and caffeine.  Discussed current prescription drug use. Given medication list.  Discussed the use of over the counter medications and chemicals.  Route of delivery discussed, including risks, benefits  Handouts given to pt.    Ultrasound images reviewed with the couple.   RTO 2-3 weeks.  New OB labs +NIPT at next visit.   [facility] fetal scan referral sent.     Mari Gold MD

## 2024-08-27 ENCOUNTER — ROUTINE PRENATAL (OUTPATIENT)
Age: 34
End: 2024-08-27

## 2024-08-27 VITALS
TEMPERATURE: 98.6 F | BODY MASS INDEX: 28.16 KG/M2 | OXYGEN SATURATION: 98 % | WEIGHT: 153 LBS | DIASTOLIC BLOOD PRESSURE: 75 MMHG | HEIGHT: 62 IN | RESPIRATION RATE: 14 BRPM | SYSTOLIC BLOOD PRESSURE: 119 MMHG | HEART RATE: 66 BPM

## 2024-08-27 DIAGNOSIS — Z34.90 PREGNANCY, UNSPECIFIED GESTATIONAL AGE: Primary | ICD-10-CM

## 2024-08-27 LAB
C. TRACHOMATIS, EXTERNAL RESULT: NEGATIVE
HEP B, EXTERNAL RESULT: NEGATIVE
HEPATITIS C ANTIBODY, EXTERNAL RESULT: NORMAL
HIV, EXTERNAL RESULT: NORMAL
N. GONORRHOEAE, EXTERNAL RESULT: NEGATIVE
RUBELLA TITER, EXTERNAL RESULT: NORMAL

## 2024-08-27 PROCEDURE — 0502F SUBSEQUENT PRENATAL CARE: CPT | Performed by: OBSTETRICS & GYNECOLOGY

## 2024-08-27 NOTE — PROGRESS NOTES
Doing well!  FHR and active movements seen on vscan today.   New OB Labs with NIPT + gender today.    We did not find carrier screening results from her prior pregnancy (checked on [employer]'s website as well), so also performed today.   Discussed second trimester guidance.

## 2024-08-28 LAB
ABO + RH BLD: NORMAL
BLOOD BANK CMNT PATIENT-IMP: NORMAL
BLOOD GROUP ANTIBODIES SERPL: NORMAL
ERYTHROCYTE [DISTWIDTH] IN BLOOD BY AUTOMATED COUNT: 13.2 % (ref 11.5–14.5)
FERRITIN SERPL-MCNC: 37 NG/ML (ref 26–388)
HBV SURFACE AG SER QL: <0.1 INDEX
HBV SURFACE AG SER QL: NEGATIVE
HCT VFR BLD AUTO: 39.6 % (ref 35–47)
HCV AB SER IA-ACNC: 0.1 INDEX
HCV AB SERPL QL IA: NONREACTIVE
HGB BLD-MCNC: 12.9 G/DL (ref 11.5–16)
HIV 1+2 AB+HIV1 P24 AG SERPL QL IA: NONREACTIVE
HIV 1/2 RESULT COMMENT: NORMAL
MCH RBC QN AUTO: 30.9 PG (ref 26–34)
MCHC RBC AUTO-ENTMCNC: 32.6 G/DL (ref 30–36.5)
MCV RBC AUTO: 94.7 FL (ref 80–99)
NRBC # BLD: 0 K/UL (ref 0–0.01)
NRBC BLD-RTO: 0 PER 100 WBC
PLATELET # BLD AUTO: 245 K/UL (ref 150–400)
PMV BLD AUTO: 10.3 FL (ref 8.9–12.9)
RBC # BLD AUTO: 4.18 M/UL (ref 3.8–5.2)
RUBV IGG SERPL IA-ACNC: NORMAL IU/ML
SPECIMEN EXP DATE BLD: NORMAL
WBC # BLD AUTO: 7.2 K/UL (ref 3.6–11)

## 2024-08-29 LAB
T PALLIDUM AB SER QL IA: NON REACTIVE
VZV IGG SER IA-ACNC: 372 INDEX

## 2024-08-30 ENCOUNTER — TELEPHONE (OUTPATIENT)
Age: 34
End: 2024-08-30

## 2024-08-30 LAB
BACTERIA SPEC CULT: ABNORMAL
CC UR VC: ABNORMAL
SERVICE CMNT-IMP: ABNORMAL

## 2024-09-02 LAB
C TRACH RRNA SPEC QL NAA+PROBE: NEGATIVE
N GONORRHOEA RRNA SPEC QL NAA+PROBE: NEGATIVE
SPECIMEN SOURCE: NORMAL
T VAGINALIS RRNA SPEC QL NAA+PROBE: NEGATIVE

## 2024-09-03 ENCOUNTER — TELEPHONE (OUTPATIENT)
Age: 34
End: 2024-09-03

## 2024-09-03 LAB
HGB A MFR BLD: 97.3 % (ref 96.4–98.8)
HGB A2 MFR BLD COLUMN CHROM: 2.7 % (ref 1.8–3.2)
HGB F MFR BLD: 0 % (ref 0–2)
HGB FRACT BLD-IMP: NORMAL
HGB S MFR BLD: 0 %

## 2024-09-03 NOTE — TELEPHONE ENCOUNTER
Left voicemail for patient to return our call or send [employer] message notifying us if she has received our message.

## 2024-09-03 NOTE — TELEPHONE ENCOUNTER
PT calling back, name and  verified    35 yo last ov 24, next ov 24  , 13w3d      PT was relayed results and MD message from 24:  Deven Xiong, your urine culture returned positive with an e coli bacteria.  I will send a prescription for an antibiotic called Augmentin to your pharmacy for treatment.  We will recheck your urine culture at your next visit. Please let us know if you have any questions.     Dr. Gold   Written by Mari Gold MD on 2024 12:50 PM EDT  Seen by patient Tyrese King on 2024  1:40 PM    PT asks if drinking water will clear the infection, as oppose to taking the medication, as she does not drink enough.  RN reviewed information on E coli in urine and tx. Advised PT to pickup up the Rx and hydrate, drink plenty of water 8 to 10 glasses a day and while taking this medication.  PT verbalizes understanding.      ABHI

## 2024-09-04 LAB
Lab: NEGATIVE
Lab: NORMAL
NTRA 22Q11.2 DELETION SYNDROME POPULATION-BASED RISK TEXT: NORMAL
NTRA 22Q11.2 DELETION SYNDROME RESULT TEXT: NORMAL
NTRA 22Q11.2 DELETION SYNDROME RISK SCORE TEXT: NORMAL
NTRA CYSTIC FIBROSIS: NEGATIVE
NTRA DUCHENNE/BECKER MUSCULAR DYSTROPHY: NEGATIVE
NTRA FETAL FRACTION: NORMAL
NTRA FRAGILE X SYNDROME: NEGATIVE
NTRA GENDER OF FETUS: NORMAL
NTRA MONOSOMY X AGE-BASED RISK TEXT: NORMAL
NTRA MONOSOMY X RESULT TEXT: NORMAL
NTRA MONOSOMY X RISK SCORE TEXT: NORMAL
NTRA SPINAL MUSCULAR ATROPHY: NEGATIVE
NTRA TRIPLOIDY RESULT TEXT: NORMAL
NTRA TRISOMY 13 AGE-BASED RISK TEXT: NORMAL
NTRA TRISOMY 13 RESULT TEXT: NORMAL
NTRA TRISOMY 13 RISK SCORE TEXT: NORMAL
NTRA TRISOMY 18 AGE-BASED RISK TEXT: NORMAL
NTRA TRISOMY 18 RESULT TEXT: NORMAL
NTRA TRISOMY 18 RISK SCORE TEXT: NORMAL
NTRA TRISOMY 21 AGE-BASED RISK TEXT: NORMAL
NTRA TRISOMY 21 RESULT TEXT: NORMAL
NTRA TRISOMY 21 RISK SCORE TEXT: NORMAL

## 2024-09-24 ENCOUNTER — ROUTINE PRENATAL (OUTPATIENT)
Age: 34
End: 2024-09-24

## 2024-09-24 VITALS
TEMPERATURE: 98.2 F | HEART RATE: 76 BPM | HEIGHT: 62 IN | SYSTOLIC BLOOD PRESSURE: 113 MMHG | BODY MASS INDEX: 28.89 KG/M2 | DIASTOLIC BLOOD PRESSURE: 73 MMHG | RESPIRATION RATE: 14 BRPM | WEIGHT: 157 LBS | OXYGEN SATURATION: 96 %

## 2024-09-24 DIAGNOSIS — Z34.90 PREGNANCY, UNSPECIFIED GESTATIONAL AGE: Primary | ICD-10-CM

## 2024-09-24 PROCEDURE — 0502F SUBSEQUENT PRENATAL CARE: CPT | Performed by: OBSTETRICS & GYNECOLOGY

## 2024-09-26 LAB
BACTERIA SPEC CULT: ABNORMAL
BACTERIA SPEC CULT: ABNORMAL
CC UR VC: ABNORMAL
SERVICE CMNT-IMP: ABNORMAL

## 2024-09-27 ENCOUNTER — OFFICE VISIT (OUTPATIENT)
Age: 34
End: 2024-09-27

## 2024-09-27 VITALS
BODY MASS INDEX: 28.52 KG/M2 | HEIGHT: 62 IN | TEMPERATURE: 98.5 F | DIASTOLIC BLOOD PRESSURE: 75 MMHG | WEIGHT: 155 LBS | OXYGEN SATURATION: 97 % | HEART RATE: 77 BPM | SYSTOLIC BLOOD PRESSURE: 112 MMHG

## 2024-09-27 DIAGNOSIS — J01.90 ACUTE RHINOSINUSITIS: Primary | ICD-10-CM

## 2024-09-27 LAB
Lab: NORMAL
PERFORMING INSTRUMENT: NORMAL
QC PASS/FAIL: NORMAL
SARS-COV-2, POC: NORMAL

## 2024-09-27 RX ORDER — NITROFURANTOIN 25; 75 MG/1; MG/1
100 CAPSULE ORAL
Qty: 90 CAPSULE | Refills: 2 | Status: SHIPPED | OUTPATIENT
Start: 2024-09-27

## 2024-09-27 RX ORDER — CEPHALEXIN 500 MG/1
500 CAPSULE ORAL 2 TIMES DAILY
Qty: 17 CAPSULE | Refills: 0 | Status: SHIPPED | OUTPATIENT
Start: 2024-09-27

## 2024-09-27 ASSESSMENT — ENCOUNTER SYMPTOMS
SORE THROAT: 1
SHORTNESS OF BREATH: 0
COUGH: 1

## 2024-10-02 DIAGNOSIS — Z34.90 PREGNANCY, UNSPECIFIED GESTATIONAL AGE: Primary | ICD-10-CM

## 2024-10-02 RX ORDER — SULFAMETHOXAZOLE/TRIMETHOPRIM 800-160 MG
1 TABLET ORAL 2 TIMES DAILY
Qty: 10 TABLET | Refills: 0 | Status: SHIPPED | OUTPATIENT
Start: 2024-10-02 | End: 2024-10-07

## 2024-10-21 ENCOUNTER — ROUTINE PRENATAL (OUTPATIENT)
Age: 34
End: 2024-10-21

## 2024-10-21 VITALS — SYSTOLIC BLOOD PRESSURE: 118 MMHG | OXYGEN SATURATION: 98 % | HEART RATE: 73 BPM | DIASTOLIC BLOOD PRESSURE: 74 MMHG

## 2024-10-21 DIAGNOSIS — Z3A.20 20 WEEKS GESTATION OF PREGNANCY: Primary | ICD-10-CM

## 2024-10-24 NOTE — PROCEDURES
PATIENT: AMILCAR MANDUJANO   -  : 1990   -  DOS:10/21/2024   -  INTERPRETING PROVIDER:Thuy Huffman,   Indication  ========    Anatomy, AMA, Bicornuate Uterus, H/O Previa    Method  ======    Transabdominal ultrasound examination. View: Suboptimal view: limited by fetal position    Dating  ======    LMP on: 2024  GA by LMP 20 w + 2 d  MARCOS by LMP: 3/8/2025  Previous Ultrasound on: 2024  Type of prior assessment: GA  GA at prior assessment date 9 w + 5 d  GA by previous U/S 20 w + 4 d  MARCOS by previous Ultrasound: 3/6/2025  Ultrasound examination on: 10/21/2024  GA by U/S based upon: AC, BPD, Femur, HC  GA by U/S 20 w + 5 d  MARCOS by U/S: 3/5/2025  Assigned: based on the LMP, selected on 10/21/2024  Assigned GA 20 w + 2 d  Assigned MARCOS: 3/8/2025    Fetal Growth Overview  =================    Exam date        GA              BPD (mm)         HC (mm)              AC (mm)              FL (mm)             HL (mm)        EFW (g)  10/21/2024        20w 2d        48     59%         178.8    44%        167.4     87%        32.4    35%                             387    78%    Fetal Biometry  ============    Standard  BPD 48.0 mm 20w 4d 59% Hadlock  OFD 63.9 mm 21w 5d 91% Kristin  .8 mm 20w 2d 44% Hadlock  Cerebellum tr 21.3 mm 20w 1d 69% Hill  Nuchal fold 4.0 mm  .4 mm 21w 5d 87% Hadlock  Femur 32.4 mm 20w 1d 35% Hadlock   g 20w 6d 78% Hadlock  EFW (lb) 0 lb  EFW (oz) 14 oz  EFW by: Hadlock (BPD-HC-AC-FL)  Extended   6.1 mm  CM 3.0 mm  3% Nicolaides  Nasal bone 6.2 mm  Head / Face / Neck  Nasal bone: present  Other Structures   bpm    General Evaluation  ==============    Cardiac activity present.  bpm. Fetal movements: visualized. Presentation: Cephalic  Placenta: Placental site: fundal  Umbilical cord: Cord vessels: 3 vessel cord. Insertion site: central  Amniotic fluid: Amount of AF: normal. MVP 7.3 cm    Fetal Anatomy  ===========    Cranium: normal  Lateral

## 2024-10-25 ENCOUNTER — ROUTINE PRENATAL (OUTPATIENT)
Age: 34
End: 2024-10-25

## 2024-10-25 VITALS
TEMPERATURE: 98.4 F | SYSTOLIC BLOOD PRESSURE: 124 MMHG | BODY MASS INDEX: 30 KG/M2 | OXYGEN SATURATION: 98 % | HEIGHT: 62 IN | DIASTOLIC BLOOD PRESSURE: 80 MMHG | WEIGHT: 163 LBS | RESPIRATION RATE: 15 BRPM | HEART RATE: 68 BPM

## 2024-10-25 DIAGNOSIS — O23.40 URINARY TRACT INFECTION IN MOTHER DURING PREGNANCY, ANTEPARTUM: Primary | ICD-10-CM

## 2024-10-25 DIAGNOSIS — Z34.90 PREGNANCY, UNSPECIFIED GESTATIONAL AGE: ICD-10-CM

## 2024-10-25 NOTE — PROGRESS NOTES
Patient arrived to appointment with no concerns today.    +FM    BS Log sent via [employer].  Patient states her BS are within parameters, except her fasting    Need urine today for IFEANYI

## 2024-10-25 NOTE — PROGRESS NOTES
Routine prenatal visit today.  Normal fetal movements.  MFM US report normal anatomy and fundal placenta - FU views scheduled.  Repeat urine culture IFEANYI today.  Taking daily macrodantin ppx.   Reviewed her glucose log - fastings largely elevated. Postprandial values all WNL.  Discussed sleep quality/duration, bedtime snack, after dinner walk as options to try. If still elevated after 1 week of these interventions, will start metformin at bedtime - she relays this worked well for her in her first pregnancy.

## 2024-10-27 LAB
BACTERIA SPEC CULT: ABNORMAL
CC UR VC: ABNORMAL
SERVICE CMNT-IMP: ABNORMAL

## 2024-11-19 ENCOUNTER — ROUTINE PRENATAL (OUTPATIENT)
Age: 34
End: 2024-11-19
Payer: COMMERCIAL

## 2024-11-19 VITALS — HEART RATE: 78 BPM | DIASTOLIC BLOOD PRESSURE: 70 MMHG | SYSTOLIC BLOOD PRESSURE: 131 MMHG

## 2024-11-19 DIAGNOSIS — O09.522 AMA (ADVANCED MATERNAL AGE) MULTIGRAVIDA 35+, SECOND TRIMESTER: ICD-10-CM

## 2024-11-19 DIAGNOSIS — Z3A.24 24 WEEKS GESTATION OF PREGNANCY: Primary | ICD-10-CM

## 2024-11-19 DIAGNOSIS — Q51.3 BICORNUATE UTERUS: ICD-10-CM

## 2024-11-19 DIAGNOSIS — Q60.0 CONGENITAL SOLITARY KIDNEY: ICD-10-CM

## 2024-11-19 DIAGNOSIS — Z98.891 HX OF CESAREAN SECTION: Primary | ICD-10-CM

## 2024-11-19 PROCEDURE — 1036F TOBACCO NON-USER: CPT

## 2024-11-19 PROCEDURE — G8484 FLU IMMUNIZE NO ADMIN: HCPCS

## 2024-11-19 PROCEDURE — 99999 PR OFFICE/OUTPT VISIT,PROCEDURE ONLY: CPT | Performed by: STUDENT IN AN ORGANIZED HEALTH CARE EDUCATION/TRAINING PROGRAM

## 2024-11-19 PROCEDURE — 76816 OB US FOLLOW-UP PER FETUS: CPT | Performed by: STUDENT IN AN ORGANIZED HEALTH CARE EDUCATION/TRAINING PROGRAM

## 2024-11-19 PROCEDURE — G8419 CALC BMI OUT NRM PARAM NOF/U: HCPCS

## 2024-11-19 PROCEDURE — G8428 CUR MEDS NOT DOCUMENT: HCPCS

## 2024-11-19 PROCEDURE — 99213 OFFICE O/P EST LOW 20 MIN: CPT

## 2024-11-19 NOTE — PROGRESS NOTES
Patient was seen 11/19/2024      Please look under media to view full consult and ultrasound report in ViewPoint.         Leanna Marc MD   Maternal Fetal Medicine

## 2024-11-19 NOTE — PROGRESS NOTES
Assessment & Plan   ASSESSMENT/PLAN:  1. Hx of  section  2. AMA (advanced maternal age) multigravida 35+, second trimester  3. Congenital solitary kidney  4. Bicornuate uterus    TYRESE is a 34-yo  who is seen for a pregnancy complicated by:     Hx of a c/s   - @ 37 weeks c/b GDM and previa   -Tyrese declined an early GCT choosing instead BG checks qid x 1wk; she reported values were WNL.   -Fetal movements, PIH and PTL Precautions reviewed.     Advanced Maternal age:   -Growth Scan x 1 @ 32-weeks’ gestation.   -Once Weekly Antepartum Testing can be initiated at 36-weeks’ gestation.   -cffDNA was low-risk.    Maternal Solitary Kidney   -Maternal left kidney enlarged and right kidney congenitally absent   -U/S of fetal kidneys appear normal   -Rec baseline preE labs (CBC, CMP, 24 hour urine protein) now and repeat at 32 weeks   -Patient at increased risk for preE would monitor BP closely     Hx bicornuate uterus    Recommendations  F/U suboptimal views/growth in 4 weeks   ANT: weekly at 36 weeks and serial growth scans starting at 28 weeks   Del: 39.0-39.6 given history of single kidney Labs: CBC, CMP, 24hr urine protein (not ordered by our office    Patient images have been reviewed. Agree with the plan of care as outlined above.   Dr. Marc    Please see Viewpoint for ultrasound findings.     Subjective   Tyrese King (:  1990) is a 34 y.o. female,Established patient, here for evaluation of the following chief complaint(s):  1. Hx of  section  2. AMA (advanced maternal age) multigravida 34+, second trimester  3. Congenital solitary kidney  4. Bicornuate uterus    Objective   Physical Exam  Vitals reviewed.   Constitutional:       Appearance: Normal appearance.   Neurological:      Mental Status: She is alert.   Psychiatric:         Mood and Affect: Mood normal.         Judgment: Judgment normal.     On this date 2024 I have spent 25 minutes reviewing previous notes, test results and face

## 2024-11-19 NOTE — PROCEDURES
PATIENT: AMILCAR MANDUJANO   -  : 1990   -  DOS:2024   -  INTERPRETING PROVIDER:Leanna Marc,   Indication  ========    Anatomy, AMA, Bicornuate Uterus    Method  ======    Transabdominal ultrasound examination. View: Sufficient    Pregnancy  =========    Rodriguez pregnancy. Number of fetuses: 1    Dating  ======    LMP on: 2024  GA by LMP 24 w + 3 d  MARCOS by LMP: 3/8/2025  Previous Ultrasound on: 2024  Type of prior assessment: GA  GA at prior assessment date 9 w + 5 d  GA by previous U/S 24 w + 5 d  MARCOS by previous Ultrasound: 3/6/2025  Ultrasound examination on: 2024  GA by U/S based upon: AC, BPD, Femur, HC  GA by U/S 24 w + 6 d  MARCOS by U/S: 3/5/2025  Assigned: based on the LMP, selected on 10/21/2024  Assigned GA 24 w + 3 d  Assigned MARCOS: 3/8/2025    Fetal Biometry  ============    Standard  BPD 61.5 mm 25w 0d 63% Hadlock  OFD 78.1 mm 25w 4d 85% Kristin  .2 mm 24w 3d 31% Hadlock  .3 mm 25w 3d 71% Hadlock  Femur 44.4 mm 24w 4d 43% Hadlock   g 24w 5d 66% Hadlock  EFW (lb) 1 lb  EFW (oz) 11 oz  EFW by: Hadlock (BPD-HC-AC-FL)  Other Structures   bpm    General Evaluation  ==============    Cardiac activity present.  bpm. Fetal movements: visualized. Presentation: Cephalic  Placenta: Placental site: fundal, appropriate distance from the internal os.  Umbilical cord: Cord vessels: 3 vessel cord. Insertion site: central  Amniotic fluid: Amount of AF: normal. MVP 3.9 cm    Fetal Anatomy  ===========    4-chamber view: normal  Heart / Thorax  Aortic arch view: SUBOPTIMAL  Ductal arch view: normal  Cardiac rhythm: regular (normal)  Stomach: normal  Kidneys: normal  Bladder: normal  Lumbar spine: normal  Sacral spine: normal  Rt fingers: normal  Lt fingers: normal    Findings  =======    Intrauterine Rodriguez pregnancy at 24w 3d by clinical dates.  EFW is 759 g at 66%, abdominal circumference at 71%.  Anatomy visualized as stated above.  Amniotic fluid:

## 2024-11-22 ENCOUNTER — ROUTINE PRENATAL (OUTPATIENT)
Age: 34
End: 2024-11-22

## 2024-11-22 VITALS
BODY MASS INDEX: 30.91 KG/M2 | RESPIRATION RATE: 18 BRPM | TEMPERATURE: 98 F | HEART RATE: 86 BPM | SYSTOLIC BLOOD PRESSURE: 110 MMHG | HEIGHT: 62 IN | DIASTOLIC BLOOD PRESSURE: 76 MMHG | WEIGHT: 168 LBS | OXYGEN SATURATION: 97 %

## 2024-11-22 DIAGNOSIS — Z34.90 PREGNANCY, UNSPECIFIED GESTATIONAL AGE: Primary | ICD-10-CM

## 2024-11-22 PROCEDURE — 0502F SUBSEQUENT PRENATAL CARE: CPT | Performed by: OBSTETRICS & GYNECOLOGY

## 2024-11-22 RX ORDER — METFORMIN HYDROCHLORIDE 500 MG/1
500 TABLET, EXTENDED RELEASE ORAL NIGHTLY
Qty: 30 TABLET | Refills: 2 | Status: SHIPPED | OUTPATIENT
Start: 2024-11-22

## 2024-11-22 NOTE — PROGRESS NOTES
Relays that she has had an increase in anxiety. Contributes this to a hormone imbalance    Active FM  Denies lof, vb and contractions

## 2024-11-22 NOTE — PROGRESS NOTES
Routine prenatal visit today.  Normal fetal movements.  Anxiety has increased, has a therapy appointment for Monday.   Also reviewed other management options. Declines medication for now.   Blood glucoses reviewed - fastings high above 95. Rx metformin at bedtime sent, this worked well for her last pregnancy.    Needs FU views with [facility], did not have a good experience with their office (mainly the US tech). Advised will message their  re her experience.

## 2024-12-18 ENCOUNTER — ROUTINE PRENATAL (OUTPATIENT)
Age: 34
End: 2024-12-18
Payer: COMMERCIAL

## 2024-12-18 ENCOUNTER — ROUTINE PRENATAL (OUTPATIENT)
Age: 34
End: 2024-12-18

## 2024-12-18 VITALS
SYSTOLIC BLOOD PRESSURE: 105 MMHG | RESPIRATION RATE: 18 BRPM | DIASTOLIC BLOOD PRESSURE: 66 MMHG | OXYGEN SATURATION: 96 % | TEMPERATURE: 98.2 F | WEIGHT: 178 LBS | BODY MASS INDEX: 32.76 KG/M2 | HEIGHT: 62 IN | HEART RATE: 88 BPM

## 2024-12-18 DIAGNOSIS — Z98.891 HX OF CESAREAN SECTION: ICD-10-CM

## 2024-12-18 DIAGNOSIS — Z3A.28 28 WEEKS GESTATION OF PREGNANCY: Primary | ICD-10-CM

## 2024-12-18 DIAGNOSIS — Q51.3 BICORNUATE UTERUS: ICD-10-CM

## 2024-12-18 DIAGNOSIS — O09.522 AMA (ADVANCED MATERNAL AGE) MULTIGRAVIDA 35+, SECOND TRIMESTER: ICD-10-CM

## 2024-12-18 DIAGNOSIS — Q60.0 CONGENITAL SOLITARY KIDNEY: ICD-10-CM

## 2024-12-18 DIAGNOSIS — Z34.90 PREGNANCY, UNSPECIFIED GESTATIONAL AGE: Primary | ICD-10-CM

## 2024-12-18 DIAGNOSIS — O24.419 GDM, CLASS A2: Primary | ICD-10-CM

## 2024-12-18 LAB
ALBUMIN SERPL-MCNC: 3 G/DL (ref 3.5–5)
ALBUMIN/GLOB SERPL: 0.9 (ref 1.1–2.2)
ALP SERPL-CCNC: 80 U/L (ref 45–117)
ALT SERPL-CCNC: 14 U/L (ref 12–78)
ANION GAP SERPL CALC-SCNC: 8 MMOL/L (ref 2–12)
AST SERPL-CCNC: 14 U/L (ref 15–37)
BILIRUB SERPL-MCNC: 0.3 MG/DL (ref 0.2–1)
BLOOD BANK CMNT PATIENT-IMP: NORMAL
BLOOD GROUP ANTIBODIES SERPL: NORMAL
BUN SERPL-MCNC: 9 MG/DL (ref 6–20)
BUN/CREAT SERPL: 14 (ref 12–20)
CALCIUM SERPL-MCNC: 8.6 MG/DL (ref 8.5–10.1)
CHLORIDE SERPL-SCNC: 105 MMOL/L (ref 97–108)
CO2 SERPL-SCNC: 24 MMOL/L (ref 21–32)
CREAT SERPL-MCNC: 0.65 MG/DL (ref 0.55–1.02)
ERYTHROCYTE [DISTWIDTH] IN BLOOD BY AUTOMATED COUNT: 14.5 % (ref 11.5–14.5)
FERRITIN SERPL-MCNC: 13 NG/ML (ref 8–252)
GLOBULIN SER CALC-MCNC: 3.2 G/DL (ref 2–4)
GLUCOSE SERPL-MCNC: 169 MG/DL (ref 65–100)
HCT VFR BLD AUTO: 38.8 % (ref 35–47)
HGB BLD-MCNC: 12.5 G/DL (ref 11.5–16)
MCH RBC QN AUTO: 32 PG (ref 26–34)
MCHC RBC AUTO-ENTMCNC: 32.2 G/DL (ref 30–36.5)
MCV RBC AUTO: 99.2 FL (ref 80–99)
NRBC # BLD: 0 K/UL (ref 0–0.01)
NRBC BLD-RTO: 0 PER 100 WBC
PLATELET # BLD AUTO: 248 K/UL (ref 150–400)
PMV BLD AUTO: 10.6 FL (ref 8.9–12.9)
POTASSIUM SERPL-SCNC: 3.9 MMOL/L (ref 3.5–5.1)
PROT SERPL-MCNC: 6.2 G/DL (ref 6.4–8.2)
RBC # BLD AUTO: 3.91 M/UL (ref 3.8–5.2)
SODIUM SERPL-SCNC: 137 MMOL/L (ref 136–145)
T. PALLIDUM (SYPHILIS) ANTIBODY, EXTERNAL RESULT: NORMAL
WBC # BLD AUTO: 9.2 K/UL (ref 3.6–11)

## 2024-12-18 PROCEDURE — G8419 CALC BMI OUT NRM PARAM NOF/U: HCPCS

## 2024-12-18 PROCEDURE — 0502F SUBSEQUENT PRENATAL CARE: CPT | Performed by: OBSTETRICS & GYNECOLOGY

## 2024-12-18 PROCEDURE — G8484 FLU IMMUNIZE NO ADMIN: HCPCS

## 2024-12-18 PROCEDURE — 1036F TOBACCO NON-USER: CPT

## 2024-12-18 PROCEDURE — 99213 OFFICE O/P EST LOW 20 MIN: CPT

## 2024-12-18 PROCEDURE — G8428 CUR MEDS NOT DOCUMENT: HCPCS

## 2024-12-18 NOTE — PROGRESS NOTES
Relays that she is doing well, has a talk therapy appointment today virtual.  Still having some low pelvic pains  Active FM  Denies lof, vb and contractions    Will do t-dap after the holidays

## 2024-12-18 NOTE — PROCEDURES
PATIENT: AMILCAR MANDUJANO   -  : 1990   -  DOS:2024   -  INTERPRETING PROVIDER:Leanna Marc,   Indication  ========    AMA, growth, bicornuate uterus    Method  ======    Transabdominal ultrasound examination. View: Sufficient    Pregnancy  =========    Rodriguez pregnancy. Number of fetuses: 1    Dating  ======    LMP on: 2024  GA by LMP 28 w + 4 d  MARCOS by LMP: 3/8/2025  Previous Ultrasound on: 2024  Type of prior assessment: GA  GA at prior assessment date 9 w + 5 d  GA by previous U/S 28 w + 6 d  MARCOS by previous Ultrasound: 3/6/2025  Ultrasound examination on: 2024  GA by U/S based upon: AC, BPD, Femur, HC  GA by U/S 29 w + 6 d  MARCOS by U/S: 2025  Assigned: based on the LMP, selected on 10/21/2024  Assigned GA 28 w + 4 d  Assigned MARCOS: 3/8/2025    Fetal Biometry  ============    Standard  BPD 76.1 mm 30w 4d 91% Hadlock  OFD 95.2 mm 30w 5d 94% Kristin  .9 mm 29w 6d 56% Hadlock  .1 mm 30w 1d 86% Hadlock  Femur 54.1 mm 28w 4d 36% Hadlock  Humerus 48.5 mm 28w 4d 40% Kristin  EFW 1,432 g 29w 2d 77% Hadlock  EFW (lb) 3 lb  EFW (oz) 3 oz  EFW by: Hadlock (BPD-HC-AC-FL)  Extended   4.3 mm  Other Structures   bpm    General Evaluation  ==============    Cardiac activity present.  bpm. Fetal movements: visualized. Presentation: Cephalic  Placenta: Placental site: fundal, appropriate distance from the internal os.  Umbilical cord: Cord vessels: 3 vessel cord. Insertion site: central  Amniotic fluid: Amount of AF: normal. MVP 4.5 cm. KAYLEIGH 16.2 cm. Q1 4.5 cm, Q2 4.5 cm, Q3 3.8 cm, Q4 3.4 cm    Fetal Anatomy  ===========    4-chamber view: normal  Heart / Thorax  Aortic arch view: normal  Cardiac rhythm: regular (normal)  Stomach: normal  Kidneys: normal  Bladder: normal    Findings  =======    Intrauterine Rodriguez pregnancy at 28w 4d by clinical dates.  EFW is 1432 g at 77%, abdominal circumference at 86%.  Anatomy visualized as stated above.  Amniotic fluid is

## 2024-12-18 NOTE — PROGRESS NOTES
Routine prenatal visit today.  Normal fetal movements.  Started the metformin at bedtime for elevated fasting glucoses, tolerating well, but stopped performing accuchecks.  Advised to continue checking accuchecks for the remainder of pregnancy.   Declines tdap today, accepts after the holidays.    MFM US today prior to her visit here, report pending. She desires her future growth US to be with our office.   Declines tdap today - accepts at an upcoming appt.   Third tri checklist given. Reviewed PTL prec.

## 2024-12-18 NOTE — PROGRESS NOTES
Assessment & Plan   ASSESSMENT/PLAN:  1. GDM, class A2  2. Hx of  section  3. AMA (advanced maternal age) multigravida 35+, second trimester  4. Congenital solitary kidney  5. Bicornuate uterus    TYRESE is a 34-yo  who is seen for a pregnancy complicated by:     AGA growth and normal KAYLEIGH today.      GDM A2/Hx GDM A1   - Tyrese declined an early GCT choosing instead BG checks qid. Continued BG checks showed increased fasting glucose. Metformin 500mg at bedtime was initiated by OB.   - Pt no longer checking blood sugars. Discussed importance of optimal glucose control and in pregnancy and encouraged to resume QID BG checks.  - Recommend diabetes educator consult: Pt declines. Reports she is well versed in GDM due to Hx GDM A1.   - Reviewed importance of BG log and submitting each week to monitor BG (Patient to submit via [employer] or in person visit)   - Recommend serial growth q4 weeks from diagnosis with MFM   - Weekly ANT starting at 32 weeks with primary OB   - Delivery between 39.0-39.6 (If poorly controlled with LGA/Poly consider delivery at 37.0-38.6)      Hx of a c/s   - @ 37 weeks c/b GDM and previa   -Fetal movements, PIH and PTL Precautions reviewed.      Advanced Maternal age:   -ANT per other indications.   - cffDNA was low-risk.     Maternal Solitary Kidney   - Maternal left kidney enlarged and right kidney congenitally absent   -U/S of fetal kidneys appear normal  - Rec baseline preE labs (CBC, CMP, 24 hour urine protein) now and repeat at 32 weeks  - Patient at increased risk for preE would monitor BP closely      Hx bicornuate uterus     Recommendations  - Recommend serial growth q4 weeks from diagnosis with MFM   - Weekly ANT starting at 32 weeks with primary OB   - Delivery between 39.0-39.6 (If poorly controlled with LGA/Poly consider delivery at 37.0-38.6)      Recommend the following labs: CBC, CMP, 24hr urine protein (not ordered by our office)     Patient desires to have follow up

## 2024-12-18 NOTE — PROGRESS NOTES
Patient was seen 12/18/2024      Please look under media to view full consult and ultrasound report in ViewPoint.         Leanna Marc MD   Maternal Fetal Medicine

## 2024-12-19 LAB — T PALLIDUM AB SER QL IA: NON REACTIVE

## 2024-12-31 ENCOUNTER — ROUTINE PRENATAL (OUTPATIENT)
Age: 34
End: 2024-12-31
Payer: COMMERCIAL

## 2024-12-31 VITALS
TEMPERATURE: 98.6 F | WEIGHT: 175 LBS | HEIGHT: 62 IN | DIASTOLIC BLOOD PRESSURE: 65 MMHG | OXYGEN SATURATION: 96 % | RESPIRATION RATE: 18 BRPM | HEART RATE: 85 BPM | SYSTOLIC BLOOD PRESSURE: 104 MMHG | BODY MASS INDEX: 32.2 KG/M2

## 2024-12-31 DIAGNOSIS — Z34.90 PREGNANCY, UNSPECIFIED GESTATIONAL AGE: Primary | ICD-10-CM

## 2024-12-31 PROCEDURE — 90715 TDAP VACCINE 7 YRS/> IM: CPT | Performed by: OBSTETRICS & GYNECOLOGY

## 2024-12-31 PROCEDURE — 0502F SUBSEQUENT PRENATAL CARE: CPT | Performed by: OBSTETRICS & GYNECOLOGY

## 2024-12-31 PROCEDURE — 90471 IMMUNIZATION ADMIN: CPT | Performed by: OBSTETRICS & GYNECOLOGY

## 2024-12-31 NOTE — PROGRESS NOTES
Doing well, feeling very fatigue recently     Active fm  Denies lof, vb and contractions    Has been checking but doesn't have it with her today. Relays some reading are elevated after meals. Will send to us on mychart today  Not taking metformin - has not taken it for 2 weeks     Accepts t-dap today      After obtaining Tyrese King's consent, and per orders of Dr. Gold, injection of t-dap given by Fidelina Rosas LPN in (L) delt. Patient instructed to remain in clinic for 20 minutes afterwards, and to report any adverse reaction to me immediately. Patient did not display any adverse side effects.

## 2024-12-31 NOTE — PROGRESS NOTES
Routine prenatal visit today.  Normal fetal movements.  Tdap - accepts today.    Her fatigue from a from weeks has improved. Stopped the metformin due to feeling like it has been causing side effects.    Relays blood glucoses have been good, does not have log with her today, will send through [employer]. Discussed possible need to restart the metformin versus insulin pending her values Discussed the rationale for fetal surveillance with GDM on medication.

## 2025-01-13 ENCOUNTER — OFFICE VISIT (OUTPATIENT)
Age: 35
End: 2025-01-13

## 2025-01-13 VITALS
RESPIRATION RATE: 18 BRPM | DIASTOLIC BLOOD PRESSURE: 82 MMHG | HEART RATE: 111 BPM | WEIGHT: 181 LBS | SYSTOLIC BLOOD PRESSURE: 125 MMHG | OXYGEN SATURATION: 96 % | HEIGHT: 62 IN | BODY MASS INDEX: 33.31 KG/M2 | TEMPERATURE: 101 F

## 2025-01-13 DIAGNOSIS — J10.1 INFLUENZA A: Primary | ICD-10-CM

## 2025-01-13 LAB
INFLUENZA A ANTIGEN, POC: POSITIVE
INFLUENZA B ANTIGEN, POC: NEGATIVE

## 2025-01-13 RX ORDER — OSELTAMIVIR PHOSPHATE 75 MG/1
75 CAPSULE ORAL 2 TIMES DAILY
Qty: 10 CAPSULE | Refills: 0 | Status: SHIPPED | OUTPATIENT
Start: 2025-01-13 | End: 2025-01-18

## 2025-01-13 NOTE — PROGRESS NOTES
Subjective     Chief Complaint   Patient presents with    Other     3rd tri pregnancy, having cough, body ache, fatigue, symptoms started yesterday.  Took covid test this morning and it was negative.       Patient is 34-year-old female presenting with cough, body aches and fatigue.  Symptoms began yesterday.  She took a COVID test at home and it was negative.  States her daughter has a cough.          Past Medical History:   Diagnosis Date    Diabetes (HCC)     Yes. Gestational on Metformin    Gestational diabetes     Kidney congenitally absent, right     Only left kidney present. Enlarged good function       Past Surgical History:   Procedure Laterality Date    OTHER SURGICAL HISTORY  2007    [place] teeth extraction       History reviewed. No pertinent family history.    Allergies   Allergen Reactions    Macadamia Nut Oil Swelling     Patient says she is not allergic    Soy Itching    Hiko Oil Bitter Flavor [Flavoring Agent (Non-Screening)]      food    Blueberry     Cherry        Social History     Tobacco Use    Smoking status: Never    Smokeless tobacco: Never   Vaping Use    Vaping status: Never Used   Substance Use Topics    Alcohol use: Not Currently    Drug use: Never       Vitals:    01/13/25 1716   BP: 125/82   Pulse: (!) 111   Resp: 18   Temp: (!) 101 °F (38.3 °C)   SpO2: 96%       Objective     Physical Exam  Vitals and nursing note reviewed.   Constitutional:       General: She is not in acute distress.     Appearance: Normal appearance. She is not ill-appearing.   HENT:      Head: Normocephalic and atraumatic.   Cardiovascular:      Rate and Rhythm: Tachycardia present.      Pulses: Normal pulses.   Pulmonary:      Effort: Pulmonary effort is normal.   Skin:     General: Skin is warm and dry.   Neurological:      Mental Status: She is alert and oriented to person, place, and time.         Assessment & Plan     Diagnoses and all orders for this visit:  Influenza A  -     POCT Influenza A/B Antigen  -

## 2025-01-13 NOTE — PATIENT INSTRUCTIONS
Thank you for visiting [facility] today.    Please follow up with your PCP as needed.  -Rest  -Drink plenty of fluids to maintain hydration  -Ibuprofen/Tylenol for pain/fever/body aches    If you begin to have increased shortness of breath or chest pain, please go to the ER.

## 2025-01-17 ENCOUNTER — TELEMEDICINE (OUTPATIENT)
Age: 35
End: 2025-01-17

## 2025-01-17 DIAGNOSIS — Z34.90 PREGNANCY, UNSPECIFIED GESTATIONAL AGE: Primary | ICD-10-CM

## 2025-01-17 PROCEDURE — 0502F SUBSEQUENT PRENATAL CARE: CPT | Performed by: OBSTETRICS & GYNECOLOGY

## 2025-01-17 SDOH — ECONOMIC STABILITY: FOOD INSECURITY: WITHIN THE PAST 12 MONTHS, THE FOOD YOU BOUGHT JUST DIDN'T LAST AND YOU DIDN'T HAVE MONEY TO GET MORE.: NEVER TRUE

## 2025-01-17 SDOH — ECONOMIC STABILITY: FOOD INSECURITY: WITHIN THE PAST 12 MONTHS, YOU WORRIED THAT YOUR FOOD WOULD RUN OUT BEFORE YOU GOT MONEY TO BUY MORE.: NEVER TRUE

## 2025-01-17 ASSESSMENT — PATIENT HEALTH QUESTIONNAIRE - PHQ9
SUM OF ALL RESPONSES TO PHQ9 QUESTIONS 1 & 2: 0
SUM OF ALL RESPONSES TO PHQ QUESTIONS 1-9: 0
2. FEELING DOWN, DEPRESSED OR HOPELESS: NOT AT ALL
SUM OF ALL RESPONSES TO PHQ QUESTIONS 1-9: 0
1. LITTLE INTEREST OR PLEASURE IN DOING THINGS: NOT AT ALL
SUM OF ALL RESPONSES TO PHQ QUESTIONS 1-9: 0
SUM OF ALL RESPONSES TO PHQ QUESTIONS 1-9: 0

## 2025-01-17 NOTE — PROGRESS NOTES
Recently dx with the flu, relays she is better but still has coughing and fatigue  Relays that she is taking tamiflu    Active FM  Denies lof, vb and contractions    Had stopped the metformin & prenatal while she has been sick, but has since started back.

## 2025-01-17 NOTE — PROGRESS NOTES
Virtual prenatal visit today.  Tyrese currently has the flu, taking Tamiflu. Starting to feel a little better.   Feels normal FM.    Restarted the metformin 500mg XR at bedtime and her fasting this morning was 87. Fastings have been elevated most days since she is doing tea with honey and cough drops during her illness. Not eating much so she hasn't checked many mealtimes levels since last visit.  She is now considering repeat c/s given fetal size with GDM.  Growth US is planned for next Tuesday, will discuss more after we obtain EFW measurements.

## 2025-01-23 ENCOUNTER — ROUTINE PRENATAL (OUTPATIENT)
Age: 35
End: 2025-01-23

## 2025-01-23 VITALS
DIASTOLIC BLOOD PRESSURE: 73 MMHG | SYSTOLIC BLOOD PRESSURE: 120 MMHG | TEMPERATURE: 97.8 F | HEART RATE: 70 BPM | OXYGEN SATURATION: 96 % | WEIGHT: 178 LBS | BODY MASS INDEX: 32.76 KG/M2 | RESPIRATION RATE: 20 BRPM | HEIGHT: 62 IN

## 2025-01-23 DIAGNOSIS — Z34.90 PREGNANCY, UNSPECIFIED GESTATIONAL AGE: Primary | ICD-10-CM

## 2025-01-23 PROCEDURE — 0502F SUBSEQUENT PRENATAL CARE: CPT | Performed by: OBSTETRICS & GYNECOLOGY

## 2025-01-23 RX ORDER — ONDANSETRON 4 MG/1
4 TABLET, ORALLY DISINTEGRATING ORAL EVERY 8 HOURS PRN
Qty: 20 TABLET | Refills: 0 | Status: SHIPPED | OUTPATIENT
Start: 2025-01-23

## 2025-01-23 RX ORDER — RESPIRATORY SYNCYTIAL VIRUS VACCINE 120MCG/0.5
0.5 KIT INTRAMUSCULAR ONCE
Qty: 0.5 ML | Refills: 0 | Status: SHIPPED | OUTPATIENT
Start: 2025-01-23 | End: 2025-01-23

## 2025-01-23 NOTE — PROGRESS NOTES
since last week she has been having lots of pelvic pain on the left  Has also had increase in nausea and heartburn but is not treating with anything    Active FM  Denies lof, vb and contractions    Relays that her numbers have been better    Accepts RSV vaccine Rx

## 2025-01-23 NOTE — PROGRESS NOTES
Routine prenatal visit today.  Normal fetal movements.  RSV vaccine rx given.    Fastings:  94, 101, 80, 85, 82, 88  (the 80s were after restarting the metformin). Advised to continue taking metformin at bed.   Pps are normal.   Normal EFW today 50%ile!  Still desires TOLAC.   Left groin pain has been more severe, hurts with turning positions and walking even short distances. Discussed pregnancy support belt and remote working.  If no relief with that, then recommend PT.

## 2025-01-30 DIAGNOSIS — Z34.90 PREGNANCY, UNSPECIFIED GESTATIONAL AGE: Primary | ICD-10-CM

## 2025-01-31 ENCOUNTER — ROUTINE PRENATAL (OUTPATIENT)
Age: 35
End: 2025-01-31

## 2025-01-31 VITALS
BODY MASS INDEX: 32.76 KG/M2 | WEIGHT: 178 LBS | RESPIRATION RATE: 18 BRPM | SYSTOLIC BLOOD PRESSURE: 117 MMHG | HEART RATE: 68 BPM | DIASTOLIC BLOOD PRESSURE: 72 MMHG | HEIGHT: 62 IN | TEMPERATURE: 98.2 F | OXYGEN SATURATION: 96 %

## 2025-01-31 DIAGNOSIS — Z34.90 PREGNANCY, UNSPECIFIED GESTATIONAL AGE: Primary | ICD-10-CM

## 2025-01-31 PROCEDURE — 0502F SUBSEQUENT PRENATAL CARE: CPT | Performed by: OBSTETRICS & GYNECOLOGY

## 2025-01-31 NOTE — PROGRESS NOTES
Relays that she had two episodes of feeling dizzy last week  Relays all her extremities felt numb once day last week. Relays that she ate something sugary and this helped her    Metformin has been stopped due to feeling dizzy and having readings in the 70's    Active FM  Denies lof, vb and contractions

## 2025-01-31 NOTE — PROGRESS NOTES
Routine prenatal visit today.  Normal fetal movements.  Stopped taking metformin last week, last dose was 1/28.    Fastings 76-88 since stopping the metformin.  Advised to stay off of the metformin given the issues she's had with it and given the start/restopping. Not many postprandial values, advised to check more consistently throughout the day.    Keep BPP next week.   Expresses that she desires salpingectomy in the event of c/s.  Will send ethics approval.

## 2025-02-07 ENCOUNTER — TELEMEDICINE (OUTPATIENT)
Age: 35
End: 2025-02-07

## 2025-02-07 DIAGNOSIS — Z34.90 PREGNANCY, UNSPECIFIED GESTATIONAL AGE: Primary | ICD-10-CM

## 2025-02-07 LAB — GBS, EXTERNAL RESULT: POSITIVE

## 2025-02-07 PROCEDURE — 0502F SUBSEQUENT PRENATAL CARE: CPT | Performed by: OBSTETRICS & GYNECOLOGY

## 2025-02-07 NOTE — PROGRESS NOTES
Virtual prenatal visit today due to Tyrese being sick.  Illness prec given and recommended testing for flu and COVID.   GBS pos in urine.    More pelvic pain and lower cramping.   Reports Active FM.   Glucose log reviewed and all values WNL.  Fastings 70-94.    Keep appt for next week for BPP (GDM, AMA) and prenatal visit.

## 2025-02-07 NOTE — PROGRESS NOTES
Feeling sick today, relays that her daughter is also sick. Headache fatigue, body aches and nausea    Has had increase in pelvic pain    Active FM  Denies lof, vb and contractions    Has been checking blood sugars, they have been WNL

## 2025-02-10 ENCOUNTER — OFFICE VISIT (OUTPATIENT)
Age: 35
End: 2025-02-10

## 2025-02-10 VITALS
TEMPERATURE: 97.7 F | BODY MASS INDEX: 33.29 KG/M2 | OXYGEN SATURATION: 96 % | WEIGHT: 182 LBS | DIASTOLIC BLOOD PRESSURE: 83 MMHG | HEART RATE: 76 BPM | SYSTOLIC BLOOD PRESSURE: 131 MMHG | RESPIRATION RATE: 18 BRPM

## 2025-02-10 DIAGNOSIS — J06.9 VIRAL UPPER RESPIRATORY TRACT INFECTION: Primary | ICD-10-CM

## 2025-02-10 NOTE — PROGRESS NOTES
Subjective   History was provided by the patient.    Tyrese King is a 34 y.o. female who presents for evaluation of symptoms of a URI. Symptoms include myalgias/arthralgias, headache, and non-productive cough. Onset of symptoms was 3 days ago, and is unchanged since that time. Evaluation to date: none. Treatment to date: none.       Objective   Physical Exam   General: alert, appears stated age, and cooperative  Ear exam: normal TM's and external ear canals both ears  Sinus exam: Normal paranasal sinuses without tenderness  Oropharynx exam: normal findings: pink and moist  Neck exam: no adenopathy  Heart exam: S1 and S2 normal  Lung exam: clear to auscultation bilaterally      Assessment   1. Viral upper respiratory tract infection  Patient appears well, VSS, afebrile, SPO2 96% on room air. No distress noted. Ears normal.  Throat and pharynx normal.  Neck supple. No adenopathy in the neck. Nose is congested. Sinuses non tender. The chest is clear, without wheezes or rales.    Patient is previously healthy and immunocompetent, presenting with symptoms suspicious for likely viral upper respiratory infection.  Differential includes acute bronchitis, rhinosinusitis, allergic rhinitis, bacterial pneumonia, or COVID.  Patient is nontoxic appearing and not in need of emergent medical intervention.    The patient is a good candidate for outpatient therapy based on normal PO intake, reassuring exam with clear lungs on auscultation, normal oxygen saturations and lack of respiratory distress upon discharge.    Discharge decision based on the following:  clinical impression is consistent with outpatient treatment, patient's exam is stable and patient's condition is stable. Patient advised if symptoms fail to improve or worsens to follow-up with PCP or ER. Patient verbalized understanding, no questions or concerns at this time.      -Provided reassurance and reassessment  -Discussed over-the-counter medications for symptomatic

## 2025-02-10 NOTE — PATIENT INSTRUCTIONS
If symptoms worsens or fail to improve follow-up with PCP or ER.    Thank you for visiting [facility] today    Nasal Congestion:  Saline irrigation kits help wash out sinuses 1-2 times a day  Normal saline nasal spray  Cough:  Throat lozenges, hot tea, and honey may help  Vicks VapoRub at night to help with cough and relieve muscles aches and pain  If not prescribed a cough medication, Robitussin DM is an option.  It is an over the counter cough medication containing dextromethorphan to help suppress cough at night   *Please only take when absolutely needed, as this is a controlled substance that can cause addiction   *Please only take cough syrup at nighttime as it causes drowsiness   *Do not drive or operate any machinery while taking this medication  Congestion:  For thick mucus, take Mucinex (with Guafenesin only) to help thin the mucus.  Follow instructions on the box.  You will need to drink plenty of water with this medication.  Sore Throat:  Lozenges, as needed. Cepacol lozenges will help numb the throat  Chloraseptic spray also helps to numb throat pain  Salt water gargles to soothe throat pain  Sinus pain/pressure:  Warm, wet towel on face to help with facial sinus pain/pressure  Headache/Pain Fever/Body Aches:  If you can take NSAIDs, take Ibuprofen 400-800mg every 8 hours as needed  If you cannot take NSAIDs, take Tylenol 325-500mg every 6 hours as needed  Miscellanous:  Zyrtec/Xyzal/Allegra/Claritin during the day or Benadryl at night may help with allergies.  You  may also use the decongestant version of these medications.   Simple foods like chicken noodle soup, smoothies, hot tea with lemon and honey may also help  Avoid smoking  Minimize exposure to irritants    Please follow up with your primary care provider within 2-5 days if your signs and symptoms have not resolved or worsened.    Please go immediately to the Emergency Department if you develop shortness of breath, chest pain and

## 2025-02-12 NOTE — PROGRESS NOTES
Has not been feeling the best. Still has congestion and fatigue. Went to urgent care Monday and tested neg for covid and flu    Active FM  Denies lof, vb & contractions    GBS POS in urine  declines cervical check

## 2025-02-14 ENCOUNTER — ROUTINE PRENATAL (OUTPATIENT)
Age: 35
End: 2025-02-14

## 2025-02-14 VITALS
WEIGHT: 184 LBS | OXYGEN SATURATION: 95 % | HEIGHT: 62 IN | TEMPERATURE: 98.5 F | HEART RATE: 62 BPM | BODY MASS INDEX: 33.86 KG/M2 | SYSTOLIC BLOOD PRESSURE: 121 MMHG | DIASTOLIC BLOOD PRESSURE: 78 MMHG | RESPIRATION RATE: 18 BRPM

## 2025-02-14 DIAGNOSIS — Z34.90 PREGNANCY, UNSPECIFIED GESTATIONAL AGE: Primary | ICD-10-CM

## 2025-02-14 PROCEDURE — 0502F SUBSEQUENT PRENATAL CARE: CPT | Performed by: OBSTETRICS & GYNECOLOGY

## 2025-02-14 NOTE — PROGRESS NOTES
Routine prenatal visit today.  Normal fetal movements.  Glucoses WNL but limited data. She still feels ill with congestion.    GBS was positive in the urine.   Reviewed her US today - EFW  67%ile, AC 75%ile, KAYLEIGH 15, cephalic.  She declines her BPP next week. Will keep the one in 2 weeks.   Labor prec reviewed.

## 2025-02-20 NOTE — PROGRESS NOTES
Doing well since last visit, relays that she has still been having swelling. Last visit her weight was 184, today in office 191    Blood sugars have been WNL    Active FM  Denies lof, vb & contractions    Cervical check declined today

## 2025-02-21 ENCOUNTER — ROUTINE PRENATAL (OUTPATIENT)
Age: 35
End: 2025-02-21

## 2025-02-21 VITALS
BODY MASS INDEX: 35.15 KG/M2 | RESPIRATION RATE: 20 BRPM | OXYGEN SATURATION: 96 % | HEIGHT: 62 IN | HEART RATE: 62 BPM | DIASTOLIC BLOOD PRESSURE: 85 MMHG | SYSTOLIC BLOOD PRESSURE: 129 MMHG | TEMPERATURE: 98 F | WEIGHT: 191 LBS

## 2025-02-21 DIAGNOSIS — Z34.90 PREGNANCY, UNSPECIFIED GESTATIONAL AGE: Primary | ICD-10-CM

## 2025-02-21 PROCEDURE — 0502F SUBSEQUENT PRENATAL CARE: CPT | Performed by: OBSTETRICS & GYNECOLOGY

## 2025-02-21 NOTE — PROGRESS NOTES
Routine prenatal visit today.  Normal fetal movements.  7 pound weight gain in the past week. Hands and feet are more swollen.  BP remains normal. She is also checking BP at home and it is normal. Precautions given for BP values of >140/90 to contact us/come in for eval.   Feels more pelvic pressure.   Normal glucose values - her meter was reviewed. Somewhat limited values due to being busy at home with  being closed frequently with the inclement weather.    RTO 1 week with BPP.

## 2025-02-27 DIAGNOSIS — Z34.90 PREGNANCY, UNSPECIFIED GESTATIONAL AGE: Primary | ICD-10-CM

## 2025-02-28 ENCOUNTER — ROUTINE PRENATAL (OUTPATIENT)
Age: 35
End: 2025-02-28

## 2025-02-28 VITALS
HEART RATE: 76 BPM | WEIGHT: 190 LBS | BODY MASS INDEX: 34.96 KG/M2 | DIASTOLIC BLOOD PRESSURE: 77 MMHG | HEIGHT: 62 IN | RESPIRATION RATE: 15 BRPM | SYSTOLIC BLOOD PRESSURE: 131 MMHG | TEMPERATURE: 97.7 F | OXYGEN SATURATION: 96 %

## 2025-02-28 DIAGNOSIS — O24.410 DIET CONTROLLED GESTATIONAL DIABETES MELLITUS (GDM) IN THIRD TRIMESTER: ICD-10-CM

## 2025-02-28 DIAGNOSIS — Z34.90 PREGNANCY, UNSPECIFIED GESTATIONAL AGE: Primary | ICD-10-CM

## 2025-02-28 NOTE — PROGRESS NOTES
Patient arrived to room today with no concerns.    She does state that she has hemorrhoids that have been bleeding x3 days.  She has tried Tucks pads, Prep H, and miralax that she states have helped.    +FM, irreg/mild contractions    Does not desire cervical check    She states that no changes have been made to her diabetes plan.    GBS+ urine

## 2025-02-28 NOTE — PROGRESS NOTES
Routine prenatal visit today.  Normal fetal movements.  Reports blood glucoses normal. Meter reviewed - normal values but again limited data.   Hand edema has improved.   BPP today 8/8, but several placental lacunae noted. This appears to be more in number and more prominent than on prior imaging. Discussed unknown significance, but after consultation with Dr. Guzmán (picture sent), recommend MFM follow-up US in 1 week. Per MFM not an indication for delivery in the setting of normal growth. Also normal BPP and KAYLEIGH.

## 2025-03-04 ENCOUNTER — TELEPHONE (OUTPATIENT)
Age: 35
End: 2025-03-04

## 2025-03-04 NOTE — TELEPHONE ENCOUNTER
Lvm for patient to call office at [phone] to schedule appointment tomorrow @ [facility]

## 2025-03-04 NOTE — TELEPHONE ENCOUNTER
Called patient and confirmed that patient had not yet set up an appointment with maternal fetal medicine that Dr. Gold recommended to be done within the week.  Patient stated initially that she would like to wait until Friday to make a decision because she feels that she is in \"early labor\" and would like to see if labor progresses.  Patient given maternal fetal medicine phone number and encouraged to call to get an appointment scheduled, which she can then cancel if she no longer needs it.  She was also notified that Dr. Gold would be able to schedule a  section for this week as well.  Patient stated that she would send a [employer] message this afternoon or in the morning regarding her decision.    MD. Gold made aware of patients decision to wait at the moment for MFM and  section.

## 2025-03-05 ENCOUNTER — ANESTHESIA (OUTPATIENT)
Facility: HOSPITAL | Age: 35
End: 2025-03-05
Payer: COMMERCIAL

## 2025-03-05 ENCOUNTER — HOSPITAL ENCOUNTER (INPATIENT)
Facility: HOSPITAL | Age: 35
LOS: 7 days | Discharge: HOME | End: 2025-03-12
Attending: OBSTETRICS & GYNECOLOGY | Admitting: OBSTETRICS & GYNECOLOGY
Payer: COMMERCIAL

## 2025-03-05 ENCOUNTER — TELEPHONE (OUTPATIENT)
Age: 35
End: 2025-03-05

## 2025-03-05 ENCOUNTER — ANESTHESIA EVENT (OUTPATIENT)
Facility: HOSPITAL | Age: 35
End: 2025-03-05
Payer: COMMERCIAL

## 2025-03-05 DIAGNOSIS — Z98.891 S/P EMERGENCY C-SECTION: Primary | ICD-10-CM

## 2025-03-05 PROBLEM — O34.219 PATIENT DESIRES VAGINAL BIRTH AFTER CESAREAN SECTION (VBAC): Status: ACTIVE | Noted: 2025-03-05

## 2025-03-05 LAB
ERYTHROCYTE [DISTWIDTH] IN BLOOD BY AUTOMATED COUNT: 13.9 % (ref 11.5–14.5)
GLUCOSE BLD STRIP.AUTO-MCNC: 113 MG/DL (ref 65–117)
HCT VFR BLD AUTO: 37.6 % (ref 35–47)
HGB BLD-MCNC: 12.9 G/DL (ref 11.5–16)
MCH RBC QN AUTO: 31.6 PG (ref 26–34)
MCHC RBC AUTO-ENTMCNC: 34.3 G/DL (ref 30–36.5)
MCV RBC AUTO: 92.2 FL (ref 80–99)
NRBC # BLD: 0 K/UL (ref 0–0.01)
NRBC BLD-RTO: 0 PER 100 WBC
PLATELET # BLD AUTO: 125 K/UL (ref 150–400)
PMV BLD AUTO: 12.9 FL (ref 8.9–12.9)
RBC # BLD AUTO: 4.08 M/UL (ref 3.8–5.2)
SERVICE CMNT-IMP: NORMAL
WBC # BLD AUTO: 10.1 K/UL (ref 3.6–11)

## 2025-03-05 PROCEDURE — 2500000003 HC RX 250 WO HCPCS: Performed by: ANESTHESIOLOGY

## 2025-03-05 PROCEDURE — 4500000002 HC ER NO CHARGE

## 2025-03-05 PROCEDURE — 2580000003 HC RX 258: Performed by: OBSTETRICS & GYNECOLOGY

## 2025-03-05 PROCEDURE — 6360000002 HC RX W HCPCS: Performed by: ANESTHESIOLOGY

## 2025-03-05 PROCEDURE — 86923 COMPATIBILITY TEST ELECTRIC: CPT

## 2025-03-05 PROCEDURE — 86901 BLOOD TYPING SEROLOGIC RH(D): CPT

## 2025-03-05 PROCEDURE — 3700000025 EPIDURAL BLOCK: Performed by: SURGERY

## 2025-03-05 PROCEDURE — 1100000000 HC RM PRIVATE

## 2025-03-05 PROCEDURE — 6360000002 HC RX W HCPCS: Performed by: OBSTETRICS & GYNECOLOGY

## 2025-03-05 PROCEDURE — 85027 COMPLETE CBC AUTOMATED: CPT

## 2025-03-05 PROCEDURE — 86900 BLOOD TYPING SEROLOGIC ABO: CPT

## 2025-03-05 PROCEDURE — NBSRV NON-BILLABLE SERVICE: Performed by: OBSTETRICS & GYNECOLOGY

## 2025-03-05 PROCEDURE — 82962 GLUCOSE BLOOD TEST: CPT

## 2025-03-05 PROCEDURE — APPNB30 APP NON BILLABLE TIME 0-30 MINS: Performed by: MIDWIFE

## 2025-03-05 PROCEDURE — 7210000100 HC LABOR FEE PER 1 HR

## 2025-03-05 PROCEDURE — 86850 RBC ANTIBODY SCREEN: CPT

## 2025-03-05 RX ORDER — ONDANSETRON 4 MG/1
4 TABLET, ORALLY DISINTEGRATING ORAL EVERY 6 HOURS PRN
Status: DISCONTINUED | OUTPATIENT
Start: 2025-03-05 | End: 2025-03-08

## 2025-03-05 RX ORDER — SODIUM CHLORIDE, SODIUM LACTATE, POTASSIUM CHLORIDE, CALCIUM CHLORIDE 600; 310; 30; 20 MG/100ML; MG/100ML; MG/100ML; MG/100ML
INJECTION, SOLUTION INTRAVENOUS CONTINUOUS
Status: DISCONTINUED | OUTPATIENT
Start: 2025-03-05 | End: 2025-03-07

## 2025-03-05 RX ORDER — FENTANYL CITRATE 50 UG/ML
INJECTION, SOLUTION INTRAMUSCULAR; INTRAVENOUS
Status: COMPLETED
Start: 2025-03-05 | End: 2025-03-05

## 2025-03-05 RX ORDER — MISOPROSTOL 200 UG/1
400 TABLET ORAL PRN
Status: DISCONTINUED | OUTPATIENT
Start: 2025-03-05 | End: 2025-03-08

## 2025-03-05 RX ORDER — CARBOPROST TROMETHAMINE 250 UG/ML
250 INJECTION, SOLUTION INTRAMUSCULAR PRN
Status: DISCONTINUED | OUTPATIENT
Start: 2025-03-05 | End: 2025-03-08

## 2025-03-05 RX ORDER — EPHEDRINE SULFATE 50 MG/ML
5 INJECTION INTRAVENOUS PRN
Status: DISCONTINUED | OUTPATIENT
Start: 2025-03-06 | End: 2025-03-06 | Stop reason: HOSPADM

## 2025-03-05 RX ORDER — SODIUM CHLORIDE, SODIUM LACTATE, POTASSIUM CHLORIDE, AND CALCIUM CHLORIDE .6; .31; .03; .02 G/100ML; G/100ML; G/100ML; G/100ML
500 INJECTION, SOLUTION INTRAVENOUS PRN
Status: DISCONTINUED | OUTPATIENT
Start: 2025-03-05 | End: 2025-03-08

## 2025-03-05 RX ORDER — FENTANYL CITRATE 50 UG/ML
INJECTION, SOLUTION INTRAMUSCULAR; INTRAVENOUS
Status: DISCONTINUED | OUTPATIENT
Start: 2025-03-05 | End: 2025-03-06 | Stop reason: SDUPTHER

## 2025-03-05 RX ORDER — NALBUPHINE HYDROCHLORIDE 10 MG/ML
10 INJECTION INTRAMUSCULAR; INTRAVENOUS; SUBCUTANEOUS
Status: DISCONTINUED | OUTPATIENT
Start: 2025-03-05 | End: 2025-03-08

## 2025-03-05 RX ORDER — SODIUM CHLORIDE 9 MG/ML
25 INJECTION, SOLUTION INTRAVENOUS PRN
Status: DISCONTINUED | OUTPATIENT
Start: 2025-03-05 | End: 2025-03-06 | Stop reason: HOSPADM

## 2025-03-05 RX ORDER — ONDANSETRON 2 MG/ML
4 INJECTION INTRAMUSCULAR; INTRAVENOUS EVERY 6 HOURS PRN
Status: DISCONTINUED | OUTPATIENT
Start: 2025-03-05 | End: 2025-03-06 | Stop reason: SDUPTHER

## 2025-03-05 RX ORDER — METHYLERGONOVINE MALEATE 0.2 MG/ML
200 INJECTION INTRAVENOUS PRN
Status: DISCONTINUED | OUTPATIENT
Start: 2025-03-05 | End: 2025-03-08

## 2025-03-05 RX ORDER — NALOXONE HYDROCHLORIDE 0.4 MG/ML
INJECTION, SOLUTION INTRAMUSCULAR; INTRAVENOUS; SUBCUTANEOUS PRN
Status: DISCONTINUED | OUTPATIENT
Start: 2025-03-05 | End: 2025-03-06 | Stop reason: HOSPADM

## 2025-03-05 RX ORDER — BUPIVACAINE HYDROCHLORIDE 2.5 MG/ML
INJECTION, SOLUTION EPIDURAL; INFILTRATION; INTRACAUDAL
Status: COMPLETED
Start: 2025-03-05 | End: 2025-03-05

## 2025-03-05 RX ORDER — EPHEDRINE SULFATE 50 MG/ML
INJECTION INTRAVENOUS
Status: COMPLETED
Start: 2025-03-05 | End: 2025-03-06

## 2025-03-05 RX ORDER — FENTANYL/BUPIVACAINE/NS/PF 2-1250MCG
1-15 PLASTIC BAG, INJECTION (ML) INJECTION CONTINUOUS
Status: DISCONTINUED | OUTPATIENT
Start: 2025-03-05 | End: 2025-03-06 | Stop reason: HOSPADM

## 2025-03-05 RX ORDER — BUPIVACAINE HYDROCHLORIDE 2.5 MG/ML
INJECTION, SOLUTION EPIDURAL; INFILTRATION; INTRACAUDAL
Status: DISCONTINUED | OUTPATIENT
Start: 2025-03-05 | End: 2025-03-06 | Stop reason: SDUPTHER

## 2025-03-05 RX ORDER — TERBUTALINE SULFATE 1 MG/ML
0.25 INJECTION SUBCUTANEOUS
Status: ACTIVE | OUTPATIENT
Start: 2025-03-05 | End: 2025-03-06

## 2025-03-05 RX ORDER — ACETAMINOPHEN 325 MG/1
650 TABLET ORAL EVERY 4 HOURS PRN
Status: DISCONTINUED | OUTPATIENT
Start: 2025-03-05 | End: 2025-03-06 | Stop reason: HOSPADM

## 2025-03-05 RX ORDER — NALBUPHINE HYDROCHLORIDE 10 MG/ML
5 INJECTION INTRAMUSCULAR; INTRAVENOUS; SUBCUTANEOUS EVERY 4 HOURS PRN
Status: DISCONTINUED | OUTPATIENT
Start: 2025-03-05 | End: 2025-03-06 | Stop reason: HOSPADM

## 2025-03-05 RX ORDER — SODIUM CHLORIDE 0.9 % (FLUSH) 0.9 %
5-40 SYRINGE (ML) INJECTION EVERY 12 HOURS SCHEDULED
Status: DISCONTINUED | OUTPATIENT
Start: 2025-03-05 | End: 2025-03-06 | Stop reason: HOSPADM

## 2025-03-05 RX ORDER — ONDANSETRON 2 MG/ML
4 INJECTION INTRAMUSCULAR; INTRAVENOUS EVERY 6 HOURS PRN
Status: DISCONTINUED | OUTPATIENT
Start: 2025-03-05 | End: 2025-03-08

## 2025-03-05 RX ORDER — EPHEDRINE SULFATE 50 MG/ML
10 INJECTION INTRAVENOUS
Status: COMPLETED | OUTPATIENT
Start: 2025-03-05 | End: 2025-03-06

## 2025-03-05 RX ORDER — SODIUM CHLORIDE 0.9 % (FLUSH) 0.9 %
5-40 SYRINGE (ML) INJECTION PRN
Status: DISCONTINUED | OUTPATIENT
Start: 2025-03-05 | End: 2025-03-06 | Stop reason: HOSPADM

## 2025-03-05 RX ADMIN — Medication 10 ML/HR: at 19:09

## 2025-03-05 RX ADMIN — PENICILLIN G POTASSIUM 5 MILLION UNITS: 5000000 INJECTION, POWDER, FOR SOLUTION INTRAMUSCULAR; INTRAVENOUS at 20:54

## 2025-03-05 RX ADMIN — ONDANSETRON 4 MG: 2 INJECTION, SOLUTION INTRAMUSCULAR; INTRAVENOUS at 22:41

## 2025-03-05 RX ADMIN — FENTANYL CITRATE 100 MCG: 50 INJECTION INTRAMUSCULAR; INTRAVENOUS at 18:34

## 2025-03-05 RX ADMIN — BUPIVACAINE HYDROCHLORIDE 10 ML: 2.5 INJECTION, SOLUTION EPIDURAL; INFILTRATION; INTRACAUDAL; PERINEURAL at 18:34

## 2025-03-05 NOTE — TELEPHONE ENCOUNTER
Name and  verified. Patient called to follow-up is she was able to get MFM appointment scheduled. Patient stated was unable to get scheduled for an appointment but said that she thinks her water may have broken this morning around 6:45am. Patient stated there was no odor to the fluid just a little bit of bloody mucous. Patient advised by RN to go and get evaluated at hospital due to GBS status. Patient agreed and will head over now.

## 2025-03-05 NOTE — ED PROVIDER NOTES
No chief complaint on file.      Past Medical History:   Diagnosis Date    Diabetes (HCC)     Yes. Gestational on Metformin    Gestational diabetes     Kidney congenitally absent, right     Only left kidney present. Enlarged good function       Past Surgical History:   Procedure Laterality Date    OTHER SURGICAL HISTORY  2007    [place] teeth extraction         No family history on file.    Social History     Socioeconomic History    Marital status:      Spouse name: Not on file    Number of children: Not on file    Years of education: Not on file    Highest education level: Not on file   Occupational History    Not on file   Tobacco Use    Smoking status: Never    Smokeless tobacco: Never   Vaping Use    Vaping status: Never Used   Substance and Sexual Activity    Alcohol use: Not Currently    Drug use: Never    Sexual activity: Yes     Partners: Male     Birth control/protection: None   Other Topics Concern    Not on file   Social History Narrative    Not on file     Social Determinants of Health     Financial Resource Strain: Low Risk  (8/6/2024)    Overall Financial Resource Strain (CARDIA)     Difficulty of Paying Living Expenses: Not hard at all   Food Insecurity: No Food Insecurity (1/17/2025)    Hunger Vital Sign     Worried About Running Out of Food in the Last Year: Never true     Ran Out of Food in the Last Year: Never true   Transportation Needs: No Transportation Needs (1/17/2025)    PRAPARE - Transportation     Lack of Transportation (Medical): No     Lack of Transportation (Non-Medical): No   Physical Activity: Not on file   Stress: Not on file   Social Connections: Not on file   Intimate Partner Violence: Not on file   Housing Stability: Low Risk  (1/17/2025)    Housing Stability Vital Sign     Unable to Pay for Housing in the Last Year: No     Number of Times Moved in the Last Year: 0     Homeless in the Last Year: No         ALLERGIES: Macadamia nut oil, Soy, [place] oil bitter flavor

## 2025-03-05 NOTE — ANESTHESIA PROCEDURE NOTES
Epidural Block    Patient location during procedure: OB  Start time: 3/5/2025 6:30 PM  Reason for block: labor epidural  Staffing  Performed: anesthesiologist   Anesthesiologist: James Escobar MD  Performed by: James Escobar MD  Authorized by: Nelson Og MD    Epidural  Patient position: sitting  Prep: DuraPrep  Patient monitoring: cardiac monitor, continuous pulse ox and frequent blood pressure checks  Approach: midline  Location: L3-4  Injection technique: RACHID saline  Provider prep: mask and sterile gloves  Needle  Needle type: Tuohy   Needle gauge: 17 G  Needle length: 3.5 in  Needle insertion depth: 6 cm  Catheter type: end hole  Catheter size: 18 G  Catheter at skin depth: 12 cmCatheter Secured: tegaderm and tape  Assessment  Sensory level: T6  Events: None  Hemodynamics: stable  Attempts: 1  Outcomes: uncomplicated and patient tolerated procedure well  Preanesthetic Checklist  Completed: patient identified, IV checked, site marked, risks and benefits discussed, surgical/procedural consents, equipment checked, pre-op evaluation, timeout performed, anesthesia consent given, oxygen available, monitors applied/VS acknowledged and fire risk safety assessment completed and verbalized

## 2025-03-05 NOTE — ANESTHESIA PRE PROCEDURE
Screening (If Applicable):   Lab Results   Component Value Date/Time    COVID19 Not-Detected 09/27/2024 11:17 AM           Anesthesia Evaluation  Patient summary reviewed and Nursing notes reviewed  Airway: Mallampati: II  TM distance: >3 FB   Neck ROM: full  Mouth opening: > = 3 FB   Dental: normal exam         Pulmonary:Negative Pulmonary ROS breath sounds clear to auscultation                             Cardiovascular:Negative CV ROS  Exercise tolerance: good (>4 METS)          Rhythm: regular  Rate: normal                    Neuro/Psych:   Negative Neuro/Psych ROS              GI/Hepatic/Renal: Neg GI/Hepatic/Renal ROS            Endo/Other:    (+) Diabetes.                 Abdominal: normal exam            Vascular: negative vascular ROS.         Other Findings:             Anesthesia Plan      epidural     ASA 2             Anesthetic plan and risks discussed with patient.                        James Escobar MD   3/5/2025

## 2025-03-06 LAB
ALBUMIN SERPL-MCNC: 2.3 G/DL (ref 3.5–5)
ALBUMIN/GLOB SERPL: 0.7 (ref 1.1–2.2)
ALP SERPL-CCNC: 172 U/L (ref 45–117)
ALT SERPL-CCNC: 10 U/L (ref 12–78)
ANION GAP SERPL CALC-SCNC: 11 MMOL/L (ref 2–12)
AST SERPL-CCNC: 18 U/L (ref 15–37)
BASOPHILS # BLD: 0.04 K/UL (ref 0–0.1)
BASOPHILS NFR BLD: 0.2 % (ref 0–1)
BILIRUB SERPL-MCNC: 0.6 MG/DL (ref 0.2–1)
BUN SERPL-MCNC: 12 MG/DL (ref 6–20)
BUN/CREAT SERPL: 10 (ref 12–20)
CALCIUM SERPL-MCNC: 8.3 MG/DL (ref 8.5–10.1)
CHLORIDE SERPL-SCNC: 99 MMOL/L (ref 97–108)
CO2 SERPL-SCNC: 19 MMOL/L (ref 21–32)
CREAT SERPL-MCNC: 1.16 MG/DL (ref 0.55–1.02)
CREAT UR-MCNC: 188 MG/DL
DIFFERENTIAL METHOD BLD: ABNORMAL
EOSINOPHIL # BLD: 0.03 K/UL (ref 0–0.4)
EOSINOPHIL NFR BLD: 0.2 % (ref 0–7)
ERYTHROCYTE [DISTWIDTH] IN BLOOD BY AUTOMATED COUNT: 13.8 % (ref 11.5–14.5)
GLOBULIN SER CALC-MCNC: 3.3 G/DL (ref 2–4)
GLUCOSE BLD STRIP.AUTO-MCNC: 106 MG/DL (ref 65–117)
GLUCOSE BLD STRIP.AUTO-MCNC: 114 MG/DL (ref 65–117)
GLUCOSE BLD STRIP.AUTO-MCNC: 125 MG/DL (ref 65–117)
GLUCOSE BLD STRIP.AUTO-MCNC: 79 MG/DL (ref 65–117)
GLUCOSE BLD STRIP.AUTO-MCNC: 79 MG/DL (ref 65–117)
GLUCOSE BLD STRIP.AUTO-MCNC: 88 MG/DL (ref 65–117)
GLUCOSE BLD STRIP.AUTO-MCNC: 89 MG/DL (ref 65–117)
GLUCOSE SERPL-MCNC: 95 MG/DL (ref 65–100)
HCT VFR BLD AUTO: 37.8 % (ref 35–47)
HGB BLD-MCNC: 12.9 G/DL (ref 11.5–16)
IMM GRANULOCYTES # BLD AUTO: 0.09 K/UL (ref 0–0.04)
IMM GRANULOCYTES NFR BLD AUTO: 0.5 % (ref 0–0.5)
LYMPHOCYTES # BLD: 1.32 K/UL (ref 0.8–3.5)
LYMPHOCYTES NFR BLD: 7.5 % (ref 12–49)
MCH RBC QN AUTO: 31.5 PG (ref 26–34)
MCHC RBC AUTO-ENTMCNC: 34.1 G/DL (ref 30–36.5)
MCV RBC AUTO: 92.4 FL (ref 80–99)
MONOCYTES # BLD: 1.64 K/UL (ref 0–1)
MONOCYTES NFR BLD: 9.3 % (ref 5–13)
NEUTS SEG # BLD: 14.56 K/UL (ref 1.8–8)
NEUTS SEG NFR BLD: 82.3 % (ref 32–75)
NRBC # BLD: 0 K/UL (ref 0–0.01)
NRBC BLD-RTO: 0 PER 100 WBC
PLATELET # BLD AUTO: 109 K/UL (ref 150–400)
PMV BLD AUTO: 12.4 FL (ref 8.9–12.9)
POTASSIUM SERPL-SCNC: 4.2 MMOL/L (ref 3.5–5.1)
PROT SERPL-MCNC: 5.6 G/DL (ref 6.4–8.2)
PROT UR-MCNC: 281 MG/DL (ref 0–11.9)
PROT/CREAT UR-RTO: 1.5
RBC # BLD AUTO: 4.09 M/UL (ref 3.8–5.2)
SERVICE CMNT-IMP: ABNORMAL
SERVICE CMNT-IMP: NORMAL
SODIUM SERPL-SCNC: 129 MMOL/L (ref 136–145)
WBC # BLD AUTO: 17.7 K/UL (ref 3.6–11)

## 2025-03-06 PROCEDURE — 7210000100 HC LABOR FEE PER 1 HR

## 2025-03-06 PROCEDURE — 1100000000 HC RM PRIVATE

## 2025-03-06 PROCEDURE — 2709999900 HC NON-CHARGEABLE SUPPLY: Performed by: OBSTETRICS & GYNECOLOGY

## 2025-03-06 PROCEDURE — 2580000003 HC RX 258: Performed by: MIDWIFE

## 2025-03-06 PROCEDURE — 80053 COMPREHEN METABOLIC PANEL: CPT

## 2025-03-06 PROCEDURE — 3700000000 HC ANESTHESIA ATTENDED CARE: Performed by: OBSTETRICS & GYNECOLOGY

## 2025-03-06 PROCEDURE — 6370000000 HC RX 637 (ALT 250 FOR IP): Performed by: OBSTETRICS & GYNECOLOGY

## 2025-03-06 PROCEDURE — 7100000000 HC PACU RECOVERY - FIRST 15 MIN: Performed by: OBSTETRICS & GYNECOLOGY

## 2025-03-06 PROCEDURE — 6360000002 HC RX W HCPCS: Performed by: OBSTETRICS & GYNECOLOGY

## 2025-03-06 PROCEDURE — 2500000003 HC RX 250 WO HCPCS: Performed by: NURSE ANESTHETIST, CERTIFIED REGISTERED

## 2025-03-06 PROCEDURE — 84156 ASSAY OF PROTEIN URINE: CPT

## 2025-03-06 PROCEDURE — 82570 ASSAY OF URINE CREATININE: CPT

## 2025-03-06 PROCEDURE — 2500000003 HC RX 250 WO HCPCS: Performed by: ANESTHESIOLOGY

## 2025-03-06 PROCEDURE — 2580000003 HC RX 258: Performed by: OBSTETRICS & GYNECOLOGY

## 2025-03-06 PROCEDURE — 3700000001 HC ADD 15 MINUTES (ANESTHESIA): Performed by: OBSTETRICS & GYNECOLOGY

## 2025-03-06 PROCEDURE — 3609079900 HC CESAREAN SECTION: Performed by: OBSTETRICS & GYNECOLOGY

## 2025-03-06 PROCEDURE — 82962 GLUCOSE BLOOD TEST: CPT

## 2025-03-06 PROCEDURE — 6360000002 HC RX W HCPCS: Performed by: MIDWIFE

## 2025-03-06 PROCEDURE — 2500000003 HC RX 250 WO HCPCS

## 2025-03-06 PROCEDURE — 85025 COMPLETE CBC W/AUTO DIFF WBC: CPT

## 2025-03-06 PROCEDURE — 2720000010 HC SURG SUPPLY STERILE

## 2025-03-06 PROCEDURE — 6360000002 HC RX W HCPCS: Performed by: ANESTHESIOLOGY

## 2025-03-06 PROCEDURE — 6360000002 HC RX W HCPCS: Performed by: SURGERY

## 2025-03-06 PROCEDURE — 2500000003 HC RX 250 WO HCPCS: Performed by: OBSTETRICS & GYNECOLOGY

## 2025-03-06 PROCEDURE — 6360000002 HC RX W HCPCS: Performed by: NURSE ANESTHETIST, CERTIFIED REGISTERED

## 2025-03-06 PROCEDURE — 6360000002 HC RX W HCPCS

## 2025-03-06 PROCEDURE — 7100000001 HC PACU RECOVERY - ADDTL 15 MIN: Performed by: OBSTETRICS & GYNECOLOGY

## 2025-03-06 PROCEDURE — 2580000003 HC RX 258

## 2025-03-06 PROCEDURE — 0W3R7ZZ CONTROL BLEEDING IN GENITOURINARY TRACT, VIA NATURAL OR ARTIFICIAL OPENING: ICD-10-PCS | Performed by: OBSTETRICS & GYNECOLOGY

## 2025-03-06 RX ORDER — CALCIUM CARBONATE 500 MG/1
1000 TABLET, CHEWABLE ORAL 3 TIMES DAILY PRN
Status: DISCONTINUED | OUTPATIENT
Start: 2025-03-06 | End: 2025-03-08

## 2025-03-06 RX ORDER — ACETAMINOPHEN 325 MG/1
975 TABLET ORAL ONCE
Status: COMPLETED | OUTPATIENT
Start: 2025-03-06 | End: 2025-03-06

## 2025-03-06 RX ORDER — EPHEDRINE SULFATE 50 MG/ML
INJECTION INTRAVENOUS
Status: DISCONTINUED | OUTPATIENT
Start: 2025-03-06 | End: 2025-03-06 | Stop reason: SDUPTHER

## 2025-03-06 RX ORDER — DEXMEDETOMIDINE HYDROCHLORIDE 100 UG/ML
INJECTION, SOLUTION INTRAVENOUS
Status: DISCONTINUED | OUTPATIENT
Start: 2025-03-06 | End: 2025-03-06 | Stop reason: SDUPTHER

## 2025-03-06 RX ORDER — TRANEXAMIC ACID 100 MG/ML
INJECTION, SOLUTION INTRAVENOUS
Status: DISCONTINUED | OUTPATIENT
Start: 2025-03-06 | End: 2025-03-06 | Stop reason: SDUPTHER

## 2025-03-06 RX ORDER — ACETAMINOPHEN 500 MG
1000 TABLET ORAL ONCE
Status: COMPLETED | OUTPATIENT
Start: 2025-03-06 | End: 2025-03-06

## 2025-03-06 RX ORDER — ONDANSETRON 2 MG/ML
INJECTION INTRAMUSCULAR; INTRAVENOUS
Status: DISCONTINUED | OUTPATIENT
Start: 2025-03-06 | End: 2025-03-06 | Stop reason: SDUPTHER

## 2025-03-06 RX ORDER — CARBOPROST TROMETHAMINE 250 UG/ML
INJECTION, SOLUTION INTRAMUSCULAR
Status: COMPLETED
Start: 2025-03-06 | End: 2025-03-07

## 2025-03-06 RX ORDER — OXYTOCIN 10 [USP'U]/ML
INJECTION, SOLUTION INTRAMUSCULAR; INTRAVENOUS
Status: DISPENSED
Start: 2025-03-06 | End: 2025-03-07

## 2025-03-06 RX ORDER — MORPHINE SULFATE 1 MG/ML
INJECTION, SOLUTION EPIDURAL; INTRATHECAL; INTRAVENOUS
Status: DISCONTINUED | OUTPATIENT
Start: 2025-03-06 | End: 2025-03-06 | Stop reason: SDUPTHER

## 2025-03-06 RX ORDER — LIDOCAINE HCL/EPINEPHRINE/PF 2%-1:200K
VIAL (ML) INJECTION
Status: DISCONTINUED | OUTPATIENT
Start: 2025-03-06 | End: 2025-03-06 | Stop reason: SDUPTHER

## 2025-03-06 RX ORDER — MISOPROSTOL 200 UG/1
TABLET ORAL
Status: DISPENSED
Start: 2025-03-06 | End: 2025-03-07

## 2025-03-06 RX ORDER — METHYLERGONOVINE MALEATE 0.2 MG/ML
INJECTION INTRAVENOUS
Status: COMPLETED
Start: 2025-03-06 | End: 2025-03-06

## 2025-03-06 RX ORDER — LIDOCAINE HCL/EPINEPHRINE/PF 2%-1:200K
VIAL (ML) INJECTION
Status: COMPLETED
Start: 2025-03-06 | End: 2025-03-06

## 2025-03-06 RX ORDER — FENTANYL CITRATE 50 UG/ML
INJECTION, SOLUTION INTRAMUSCULAR; INTRAVENOUS
Status: COMPLETED
Start: 2025-03-06 | End: 2025-03-06

## 2025-03-06 RX ORDER — DIPHENHYDRAMINE HYDROCHLORIDE 50 MG/ML
50 INJECTION INTRAMUSCULAR; INTRAVENOUS
Status: COMPLETED | OUTPATIENT
Start: 2025-03-06 | End: 2025-03-06

## 2025-03-06 RX ORDER — TRANEXAMIC ACID 100 MG/ML
INJECTION, SOLUTION INTRAVENOUS
Status: COMPLETED
Start: 2025-03-06 | End: 2025-03-06

## 2025-03-06 RX ADMIN — FENTANYL CITRATE 100 MCG: 50 INJECTION INTRAMUSCULAR; INTRAVENOUS at 21:01

## 2025-03-06 RX ADMIN — LIDOCAINE HYDROCHLORIDE,EPINEPHRINE BITARTRATE 8 ML: 20; .005 INJECTION, SOLUTION EPIDURAL; INFILTRATION; INTRACAUDAL; PERINEURAL at 21:01

## 2025-03-06 RX ADMIN — OXYTOCIN 1 MILLI-UNITS/MIN: 10 INJECTION, SOLUTION INTRAMUSCULAR; INTRAVENOUS at 20:22

## 2025-03-06 RX ADMIN — OXYTOCIN 1 MILLI-UNITS/MIN: 10 INJECTION, SOLUTION INTRAMUSCULAR; INTRAVENOUS at 00:51

## 2025-03-06 RX ADMIN — SODIUM CHLORIDE, POTASSIUM CHLORIDE, SODIUM LACTATE AND CALCIUM CHLORIDE: 600; 310; 30; 20 INJECTION, SOLUTION INTRAVENOUS at 22:10

## 2025-03-06 RX ADMIN — SODIUM CHLORIDE 2.5 MILLION UNITS: 9 INJECTION, SOLUTION INTRAVENOUS at 09:05

## 2025-03-06 RX ADMIN — SODIUM CHLORIDE 2.5 MILLION UNITS: 9 INJECTION, SOLUTION INTRAVENOUS at 00:48

## 2025-03-06 RX ADMIN — AZITHROMYCIN MONOHYDRATE 500 MG: 500 INJECTION, POWDER, LYOPHILIZED, FOR SOLUTION INTRAVENOUS at 20:57

## 2025-03-06 RX ADMIN — METHYLERGONOVINE MALEATE 200 MCG: 0.2 INJECTION, SOLUTION INTRAMUSCULAR; INTRAVENOUS at 22:48

## 2025-03-06 RX ADMIN — CALCIUM CARBONATE (ANTACID) CHEW TAB 500 MG 1000 MG: 500 CHEW TAB at 18:51

## 2025-03-06 RX ADMIN — LIDOCAINE HYDROCHLORIDE,EPINEPHRINE BITARTRATE 3 ML: 20; .005 INJECTION, SOLUTION EPIDURAL; INFILTRATION; INTRACAUDAL; PERINEURAL at 21:30

## 2025-03-06 RX ADMIN — Medication 10 ML/HR: at 03:55

## 2025-03-06 RX ADMIN — ONDANSETRON 4 MG: 2 INJECTION, SOLUTION INTRAMUSCULAR; INTRAVENOUS at 23:36

## 2025-03-06 RX ADMIN — METHYLERGONOVINE MALEATE 200 MCG: 0.2 INJECTION INTRAVENOUS at 22:48

## 2025-03-06 RX ADMIN — WATER 2000 MG: 1 INJECTION INTRAMUSCULAR; INTRAVENOUS; SUBCUTANEOUS at 21:30

## 2025-03-06 RX ADMIN — ONDANSETRON 4 MG: 2 INJECTION, SOLUTION INTRAMUSCULAR; INTRAVENOUS at 21:28

## 2025-03-06 RX ADMIN — DEXMEDETOMIDINE 4 MCG: 100 INJECTION, SOLUTION, CONCENTRATE INTRAVENOUS at 21:32

## 2025-03-06 RX ADMIN — EPHEDRINE SULFATE 10 MG: 50 INJECTION INTRAVENOUS at 23:57

## 2025-03-06 RX ADMIN — Medication 10 ML/HR: at 19:55

## 2025-03-06 RX ADMIN — MORPHINE SULFATE 1 MG: 1 INJECTION EPIDURAL; INTRATHECAL; INTRAVENOUS at 22:29

## 2025-03-06 RX ADMIN — Medication 10 ML/HR: at 11:00

## 2025-03-06 RX ADMIN — DIPHENHYDRAMINE HYDROCHLORIDE 50 MG: 50 INJECTION INTRAMUSCULAR; INTRAVENOUS at 18:52

## 2025-03-06 RX ADMIN — LIDOCAINE HYDROCHLORIDE,EPINEPHRINE BITARTRATE 4 ML: 20; .005 INJECTION, SOLUTION EPIDURAL; INFILTRATION; INTRACAUDAL; PERINEURAL at 21:21

## 2025-03-06 RX ADMIN — AMPICILLIN SODIUM 2000 MG: 2 INJECTION, POWDER, FOR SOLUTION INTRAMUSCULAR; INTRAVENOUS at 12:04

## 2025-03-06 RX ADMIN — ACETAMINOPHEN 650 MG: 325 TABLET ORAL at 01:08

## 2025-03-06 RX ADMIN — FAMOTIDINE 20 MG: 10 INJECTION, SOLUTION INTRAVENOUS at 21:09

## 2025-03-06 RX ADMIN — DEXMEDETOMIDINE 4 MCG: 100 INJECTION, SOLUTION, CONCENTRATE INTRAVENOUS at 21:28

## 2025-03-06 RX ADMIN — SODIUM CHLORIDE, POTASSIUM CHLORIDE, SODIUM LACTATE AND CALCIUM CHLORIDE: 600; 310; 30; 20 INJECTION, SOLUTION INTRAVENOUS at 20:53

## 2025-03-06 RX ADMIN — GENTAMICIN SULFATE 430 MG: 40 INJECTION, SOLUTION INTRAMUSCULAR; INTRAVENOUS at 12:58

## 2025-03-06 RX ADMIN — AMPICILLIN SODIUM 2000 MG: 2 INJECTION, POWDER, FOR SOLUTION INTRAMUSCULAR; INTRAVENOUS at 18:07

## 2025-03-06 RX ADMIN — MORPHINE SULFATE 1 MG: 1 INJECTION EPIDURAL; INTRATHECAL; INTRAVENOUS at 22:16

## 2025-03-06 RX ADMIN — ACETAMINOPHEN 975 MG: 325 TABLET ORAL at 21:08

## 2025-03-06 RX ADMIN — MORPHINE SULFATE 1 MG: 1 INJECTION EPIDURAL; INTRATHECAL; INTRAVENOUS at 22:06

## 2025-03-06 RX ADMIN — EPHEDRINE SULFATE 10 MG: 50 INJECTION INTRAVENOUS at 22:42

## 2025-03-06 RX ADMIN — ACETAMINOPHEN 1000 MG: 500 TABLET ORAL at 12:07

## 2025-03-06 RX ADMIN — SODIUM CHLORIDE 2.5 MILLION UNITS: 9 INJECTION, SOLUTION INTRAVENOUS at 04:50

## 2025-03-06 RX ADMIN — TRANEXAMIC ACID 1000 MG: 100 INJECTION, SOLUTION INTRAVENOUS at 21:55

## 2025-03-07 ENCOUNTER — APPOINTMENT (OUTPATIENT)
Facility: HOSPITAL | Age: 35
End: 2025-03-07
Payer: COMMERCIAL

## 2025-03-07 ENCOUNTER — ANESTHESIA EVENT (OUTPATIENT)
Facility: HOSPITAL | Age: 35
End: 2025-03-07
Payer: COMMERCIAL

## 2025-03-07 ENCOUNTER — ANESTHESIA (OUTPATIENT)
Facility: HOSPITAL | Age: 35
End: 2025-03-07
Payer: COMMERCIAL

## 2025-03-07 LAB
ALBUMIN SERPL-MCNC: 2 G/DL (ref 3.5–5)
ALBUMIN/GLOB SERPL: 1.7 (ref 1.1–2.2)
ALP SERPL-CCNC: 53 U/L (ref 45–117)
ALT SERPL-CCNC: 9 U/L (ref 12–78)
ANION GAP SERPL CALC-SCNC: 14 MMOL/L (ref 2–12)
ANION GAP SERPL CALC-SCNC: 17 MMOL/L (ref 2–12)
APTT PPP: 50.8 SEC (ref 22.1–31)
AST SERPL-CCNC: 10 U/L (ref 15–37)
BASOPHILS # BLD: 0 K/UL (ref 0–0.1)
BASOPHILS NFR BLD: 0 % (ref 0–1)
BILIRUB SERPL-MCNC: 0.4 MG/DL (ref 0.2–1)
BUN SERPL-MCNC: 13 MG/DL (ref 6–20)
BUN SERPL-MCNC: 15 MG/DL (ref 6–20)
BUN/CREAT SERPL: 8 (ref 12–20)
BUN/CREAT SERPL: 9 (ref 12–20)
CALCIUM SERPL-MCNC: 6.9 MG/DL (ref 8.5–10.1)
CALCIUM SERPL-MCNC: 7.3 MG/DL (ref 8.5–10.1)
CHLORIDE SERPL-SCNC: 101 MMOL/L (ref 97–108)
CHLORIDE SERPL-SCNC: 103 MMOL/L (ref 97–108)
CIT FUNC FIB MAX AMP: 12.2 MM (ref 15–32)
CIT RAPIDTEG MAX AMP: 53.4 MM (ref 52–70)
CITRATED KAOLIN LY30: 0 % (ref 0–2.6)
CITRATED KAOLIN R TIME: 3.7 MINS (ref 4.6–9.1)
CO2 SERPL-SCNC: 11 MMOL/L (ref 21–32)
CO2 SERPL-SCNC: 15 MMOL/L (ref 21–32)
CREAT SERPL-MCNC: 1.58 MG/DL (ref 0.55–1.02)
CREAT SERPL-MCNC: 1.64 MG/DL (ref 0.55–1.02)
DIFFERENTIAL METHOD BLD: ABNORMAL
EOSINOPHIL # BLD: 0 K/UL (ref 0–0.4)
EOSINOPHIL NFR BLD: 0 % (ref 0–7)
ERYTHROCYTE [DISTWIDTH] IN BLOOD BY AUTOMATED COUNT: 14.5 % (ref 11.5–14.5)
ERYTHROCYTE [DISTWIDTH] IN BLOOD BY AUTOMATED COUNT: 14.6 % (ref 11.5–14.5)
ERYTHROCYTE [DISTWIDTH] IN BLOOD BY AUTOMATED COUNT: 14.6 % (ref 11.5–14.5)
FIBRINOGEN PPP-MCNC: 148 MG/DL (ref 200–475)
FIBRINOGEN PPP-MCNC: 164 MG/DL (ref 200–475)
FIBRINOGEN PPP-MCNC: 353 MG/DL (ref 200–475)
GLOBULIN SER CALC-MCNC: 1.2 G/DL (ref 2–4)
GLUCOSE BLD STRIP.AUTO-MCNC: 55 MG/DL (ref 65–117)
GLUCOSE BLD STRIP.AUTO-MCNC: 55 MG/DL (ref 65–117)
GLUCOSE SERPL-MCNC: 162 MG/DL (ref 65–100)
GLUCOSE SERPL-MCNC: 98 MG/DL (ref 65–100)
HCT VFR BLD AUTO: 12.6 % (ref 35–47)
HCT VFR BLD AUTO: 18.3 % (ref 35–47)
HCT VFR BLD AUTO: 19.7 % (ref 35–47)
HGB BLD-MCNC: 4.1 G/DL (ref 11.5–16)
HGB BLD-MCNC: 6.1 G/DL (ref 11.5–16)
HGB BLD-MCNC: 6.4 G/DL (ref 11.5–16)
HGB BLD-MCNC: 8 G/DL (ref 11.5–16)
HISTORY CHECK: NORMAL
IMM GRANULOCYTES # BLD AUTO: 0 K/UL
IMM GRANULOCYTES NFR BLD AUTO: 0 %
INR PPP: 1.3 (ref 0.9–1.1)
LYMPHOCYTES # BLD: 0.88 K/UL (ref 0.8–3.5)
LYMPHOCYTES NFR BLD: 6 % (ref 12–49)
MCH RBC QN AUTO: 29.5 PG (ref 26–34)
MCH RBC QN AUTO: 30.3 PG (ref 26–34)
MCH RBC QN AUTO: 31.3 PG (ref 26–34)
MCHC RBC AUTO-ENTMCNC: 31 G/DL (ref 30–36.5)
MCHC RBC AUTO-ENTMCNC: 32.5 G/DL (ref 30–36.5)
MCHC RBC AUTO-ENTMCNC: 35 G/DL (ref 30–36.5)
MCV RBC AUTO: 86.7 FL (ref 80–99)
MCV RBC AUTO: 95.2 FL (ref 80–99)
MCV RBC AUTO: 96.2 FL (ref 80–99)
MONOCYTES # BLD: 0.29 K/UL (ref 0–1)
MONOCYTES NFR BLD: 2 % (ref 5–13)
NEUTS BAND NFR BLD MANUAL: 4 % (ref 0–6)
NEUTS SEG # BLD: 13.43 K/UL (ref 1.8–8)
NEUTS SEG NFR BLD: 88 % (ref 32–75)
NRBC # BLD: 0 K/UL (ref 0–0.01)
NRBC BLD-RTO: 0 PER 100 WBC
PLATELET # BLD AUTO: 105 K/UL (ref 150–400)
PLATELET # BLD AUTO: 83 K/UL (ref 150–400)
PLATELET # BLD AUTO: 86 K/UL (ref 150–400)
PMV BLD AUTO: 12 FL (ref 8.9–12.9)
PMV BLD AUTO: 12.7 FL (ref 8.9–12.9)
POTASSIUM SERPL-SCNC: 4.1 MMOL/L (ref 3.5–5.1)
POTASSIUM SERPL-SCNC: 5.3 MMOL/L (ref 3.5–5.1)
PROT SERPL-MCNC: 3.2 G/DL (ref 6.4–8.2)
PROTHROMBIN TIME: 13.5 SEC (ref 9.2–11.2)
RBC # BLD AUTO: 1.31 M/UL (ref 3.8–5.2)
RBC # BLD AUTO: 2.07 M/UL (ref 3.8–5.2)
RBC # BLD AUTO: 2.11 M/UL (ref 3.8–5.2)
RBC MORPH BLD: ABNORMAL
SERVICE CMNT-IMP: ABNORMAL
SERVICE CMNT-IMP: ABNORMAL
SODIUM SERPL-SCNC: 130 MMOL/L (ref 136–145)
SODIUM SERPL-SCNC: 131 MMOL/L (ref 136–145)
THERAPEUTIC RANGE: ABNORMAL SECS (ref 58–77)
WBC # BLD AUTO: 12.7 K/UL (ref 3.6–11)
WBC # BLD AUTO: 14.2 K/UL (ref 3.6–11)
WBC # BLD AUTO: 14.6 K/UL (ref 3.6–11)

## 2025-03-07 PROCEDURE — P9045 ALBUMIN (HUMAN), 5%, 250 ML: HCPCS | Performed by: SURGERY

## 2025-03-07 PROCEDURE — 6360000002 HC RX W HCPCS: Performed by: INTERNAL MEDICINE

## 2025-03-07 PROCEDURE — 2580000003 HC RX 258: Performed by: OBSTETRICS & GYNECOLOGY

## 2025-03-07 PROCEDURE — P9073 PLATELETS PHERESIS PATH REDU: HCPCS

## 2025-03-07 PROCEDURE — 6370000000 HC RX 637 (ALT 250 FOR IP): Performed by: OBSTETRICS & GYNECOLOGY

## 2025-03-07 PROCEDURE — 6360000002 HC RX W HCPCS: Performed by: SURGERY

## 2025-03-07 PROCEDURE — C1769 GUIDE WIRE: HCPCS

## 2025-03-07 PROCEDURE — 6360000002 HC RX W HCPCS: Performed by: STUDENT IN AN ORGANIZED HEALTH CARE EDUCATION/TRAINING PROGRAM

## 2025-03-07 PROCEDURE — 85384 FIBRINOGEN ACTIVITY: CPT

## 2025-03-07 PROCEDURE — 2500000003 HC RX 250 WO HCPCS: Performed by: OBSTETRICS & GYNECOLOGY

## 2025-03-07 PROCEDURE — 85610 PROTHROMBIN TIME: CPT

## 2025-03-07 PROCEDURE — 30233M1 TRANSFUSION OF NONAUTOLOGOUS PLASMA CRYOPRECIPITATE INTO PERIPHERAL VEIN, PERCUTANEOUS APPROACH: ICD-10-PCS | Performed by: OBSTETRICS & GYNECOLOGY

## 2025-03-07 PROCEDURE — 36430 TRANSFUSION BLD/BLD COMPNT: CPT

## 2025-03-07 PROCEDURE — 04LE3DT OCCLUSION OF RIGHT UTERINE ARTERY WITH INTRALUMINAL DEVICE, PERCUTANEOUS APPROACH: ICD-10-PCS | Performed by: STUDENT IN AN ORGANIZED HEALTH CARE EDUCATION/TRAINING PROGRAM

## 2025-03-07 PROCEDURE — 85025 COMPLETE CBC W/AUTO DIFF WBC: CPT

## 2025-03-07 PROCEDURE — 6360000002 HC RX W HCPCS: Performed by: OBSTETRICS & GYNECOLOGY

## 2025-03-07 PROCEDURE — 2580000003 HC RX 258: Performed by: PHYSICIAN ASSISTANT

## 2025-03-07 PROCEDURE — 2500000003 HC RX 250 WO HCPCS: Performed by: NURSE ANESTHETIST, CERTIFIED REGISTERED

## 2025-03-07 PROCEDURE — 2500000003 HC RX 250 WO HCPCS: Performed by: ANESTHESIOLOGY

## 2025-03-07 PROCEDURE — P9012 CRYOPRECIPITATE EACH UNIT: HCPCS

## 2025-03-07 PROCEDURE — 2580000003 HC RX 258: Performed by: NURSE ANESTHETIST, CERTIFIED REGISTERED

## 2025-03-07 PROCEDURE — 85390 FIBRINOLYSINS SCREEN I&R: CPT

## 2025-03-07 PROCEDURE — 85027 COMPLETE CBC AUTOMATED: CPT

## 2025-03-07 PROCEDURE — 85018 HEMOGLOBIN: CPT

## 2025-03-07 PROCEDURE — 82962 GLUCOSE BLOOD TEST: CPT

## 2025-03-07 PROCEDURE — P9045 ALBUMIN (HUMAN), 5%, 250 ML: HCPCS | Performed by: ANESTHESIOLOGY

## 2025-03-07 PROCEDURE — 2000000000 HC ICU R&B

## 2025-03-07 PROCEDURE — 2500000003 HC RX 250 WO HCPCS

## 2025-03-07 PROCEDURE — 85730 THROMBOPLASTIN TIME PARTIAL: CPT

## 2025-03-07 PROCEDURE — 04LF3DU OCCLUSION OF LEFT UTERINE ARTERY WITH INTRALUMINAL DEVICE, PERCUTANEOUS APPROACH: ICD-10-PCS | Performed by: STUDENT IN AN ORGANIZED HEALTH CARE EDUCATION/TRAINING PROGRAM

## 2025-03-07 PROCEDURE — 85347 COAGULATION TIME ACTIVATED: CPT

## 2025-03-07 PROCEDURE — 80053 COMPREHEN METABOLIC PANEL: CPT

## 2025-03-07 PROCEDURE — 2500000003 HC RX 250 WO HCPCS: Performed by: SURGERY

## 2025-03-07 PROCEDURE — 2500000003 HC RX 250 WO HCPCS: Performed by: PHYSICIAN ASSISTANT

## 2025-03-07 PROCEDURE — P9059 PLASMA, FRZ BETWEEN 8-24HOUR: HCPCS

## 2025-03-07 PROCEDURE — 6360000002 HC RX W HCPCS: Performed by: ANESTHESIOLOGY

## 2025-03-07 PROCEDURE — 6360000002 HC RX W HCPCS: Performed by: PHYSICIAN ASSISTANT

## 2025-03-07 PROCEDURE — 99233 SBSQ HOSP IP/OBS HIGH 50: CPT | Performed by: OBSTETRICS & GYNECOLOGY

## 2025-03-07 PROCEDURE — 6360000004 HC RX CONTRAST MEDICATION: Performed by: STUDENT IN AN ORGANIZED HEALTH CARE EDUCATION/TRAINING PROGRAM

## 2025-03-07 PROCEDURE — 75774 ARTERY X-RAY EACH VESSEL: CPT

## 2025-03-07 PROCEDURE — 30233N1 TRANSFUSION OF NONAUTOLOGOUS RED BLOOD CELLS INTO PERIPHERAL VEIN, PERCUTANEOUS APPROACH: ICD-10-PCS | Performed by: OBSTETRICS & GYNECOLOGY

## 2025-03-07 PROCEDURE — B41J1ZZ FLUOROSCOPY OF OTHER LOWER ARTERIES USING LOW OSMOLAR CONTRAST: ICD-10-PCS | Performed by: STUDENT IN AN ORGANIZED HEALTH CARE EDUCATION/TRAINING PROGRAM

## 2025-03-07 PROCEDURE — 85576 BLOOD PLATELET AGGREGATION: CPT

## 2025-03-07 PROCEDURE — P9016 RBC LEUKOCYTES REDUCED: HCPCS

## 2025-03-07 RX ORDER — ACETAMINOPHEN 500 MG
1000 TABLET ORAL EVERY 8 HOURS PRN
Status: DISCONTINUED | OUTPATIENT
Start: 2025-03-07 | End: 2025-03-07

## 2025-03-07 RX ORDER — HYDROMORPHONE HYDROCHLORIDE 1 MG/ML
0.5 INJECTION, SOLUTION INTRAMUSCULAR; INTRAVENOUS; SUBCUTANEOUS
Status: DISCONTINUED | OUTPATIENT
Start: 2025-03-07 | End: 2025-03-08

## 2025-03-07 RX ORDER — SODIUM CHLORIDE, SODIUM LACTATE, POTASSIUM CHLORIDE, CALCIUM CHLORIDE 600; 310; 30; 20 MG/100ML; MG/100ML; MG/100ML; MG/100ML
INJECTION, SOLUTION INTRAVENOUS
Status: DISCONTINUED | OUTPATIENT
Start: 2025-03-07 | End: 2025-03-07 | Stop reason: SDUPTHER

## 2025-03-07 RX ORDER — FENTANYL CITRATE 50 UG/ML
25 INJECTION, SOLUTION INTRAMUSCULAR; INTRAVENOUS EVERY 5 MIN PRN
Status: DISCONTINUED | OUTPATIENT
Start: 2025-03-07 | End: 2025-03-08

## 2025-03-07 RX ORDER — FUROSEMIDE 10 MG/ML
80 INJECTION INTRAMUSCULAR; INTRAVENOUS ONCE
Status: COMPLETED | OUTPATIENT
Start: 2025-03-07 | End: 2025-03-07

## 2025-03-07 RX ORDER — NALOXONE HYDROCHLORIDE 0.4 MG/ML
INJECTION, SOLUTION INTRAMUSCULAR; INTRAVENOUS; SUBCUTANEOUS PRN
Status: DISCONTINUED | OUTPATIENT
Start: 2025-03-07 | End: 2025-03-08

## 2025-03-07 RX ORDER — PHENYLEPHRINE HCL IN 0.9% NACL 0.4MG/10ML
SYRINGE (ML) INTRAVENOUS
Status: DISCONTINUED | OUTPATIENT
Start: 2025-03-07 | End: 2025-03-07 | Stop reason: SDUPTHER

## 2025-03-07 RX ORDER — ACETAMINOPHEN 500 MG
1000 TABLET ORAL EVERY 8 HOURS SCHEDULED
Status: DISCONTINUED | OUTPATIENT
Start: 2025-03-07 | End: 2025-03-12 | Stop reason: HOSPADM

## 2025-03-07 RX ORDER — SODIUM CHLORIDE 0.9 % (FLUSH) 0.9 %
5-40 SYRINGE (ML) INJECTION PRN
Status: DISCONTINUED | OUTPATIENT
Start: 2025-03-07 | End: 2025-03-10 | Stop reason: SDUPTHER

## 2025-03-07 RX ORDER — EPHEDRINE SULFATE 50 MG/ML
5 INJECTION INTRAVENOUS PRN
Status: DISCONTINUED | OUTPATIENT
Start: 2025-03-08 | End: 2025-03-08

## 2025-03-07 RX ORDER — SODIUM CHLORIDE 9 MG/ML
INJECTION, SOLUTION INTRAVENOUS PRN
Status: DISCONTINUED | OUTPATIENT
Start: 2025-03-07 | End: 2025-03-07 | Stop reason: SDUPTHER

## 2025-03-07 RX ORDER — EPHEDRINE SULFATE 50 MG/ML
10 INJECTION INTRAVENOUS
Status: DISCONTINUED | OUTPATIENT
Start: 2025-03-07 | End: 2025-03-08

## 2025-03-07 RX ORDER — IOPAMIDOL 755 MG/ML
INJECTION, SOLUTION INTRAVASCULAR PRN
Status: COMPLETED | OUTPATIENT
Start: 2025-03-07 | End: 2025-03-07

## 2025-03-07 RX ORDER — CLINDAMYCIN PHOSPHATE 900 MG/50ML
900 INJECTION, SOLUTION INTRAVENOUS EVERY 8 HOURS
Status: DISCONTINUED | OUTPATIENT
Start: 2025-03-07 | End: 2025-03-08 | Stop reason: ALTCHOICE

## 2025-03-07 RX ORDER — HYDRALAZINE HYDROCHLORIDE 20 MG/ML
10 INJECTION INTRAMUSCULAR; INTRAVENOUS ONCE
Status: DISCONTINUED | OUTPATIENT
Start: 2025-03-07 | End: 2025-03-07 | Stop reason: SDUPTHER

## 2025-03-07 RX ORDER — ONDANSETRON 2 MG/ML
4 INJECTION INTRAMUSCULAR; INTRAVENOUS
Status: DISCONTINUED | OUTPATIENT
Start: 2025-03-07 | End: 2025-03-08

## 2025-03-07 RX ORDER — SODIUM CHLORIDE 9 MG/ML
INJECTION, SOLUTION INTRAVENOUS PRN
Status: DISCONTINUED | OUTPATIENT
Start: 2025-03-07 | End: 2025-03-07

## 2025-03-07 RX ORDER — DEXTROSE MONOHYDRATE 100 MG/ML
INJECTION, SOLUTION INTRAVENOUS CONTINUOUS
Status: DISCONTINUED | OUTPATIENT
Start: 2025-03-07 | End: 2025-03-07

## 2025-03-07 RX ORDER — OXYCODONE HYDROCHLORIDE 5 MG/1
5 TABLET ORAL EVERY 4 HOURS PRN
Status: DISCONTINUED | OUTPATIENT
Start: 2025-03-07 | End: 2025-03-10 | Stop reason: SDUPTHER

## 2025-03-07 RX ORDER — SWAB
1 SWAB, NON-MEDICATED MISCELLANEOUS DAILY
Status: DISCONTINUED | OUTPATIENT
Start: 2025-03-07 | End: 2025-03-12 | Stop reason: HOSPADM

## 2025-03-07 RX ORDER — PROCHLORPERAZINE EDISYLATE 5 MG/ML
5 INJECTION INTRAMUSCULAR; INTRAVENOUS
Status: DISCONTINUED | OUTPATIENT
Start: 2025-03-07 | End: 2025-03-08

## 2025-03-07 RX ORDER — ALBUMIN HUMAN 50 G/1000ML
SOLUTION INTRAVENOUS
Status: DISCONTINUED | OUTPATIENT
Start: 2025-03-07 | End: 2025-03-07 | Stop reason: SDUPTHER

## 2025-03-07 RX ORDER — SODIUM CHLORIDE 9 MG/ML
INJECTION, SOLUTION INTRAVENOUS PRN
Status: DISCONTINUED | OUTPATIENT
Start: 2025-03-07 | End: 2025-03-10 | Stop reason: SDUPTHER

## 2025-03-07 RX ORDER — SODIUM CHLORIDE 0.9 % (FLUSH) 0.9 %
5-40 SYRINGE (ML) INJECTION EVERY 12 HOURS SCHEDULED
Status: DISCONTINUED | OUTPATIENT
Start: 2025-03-07 | End: 2025-03-10 | Stop reason: SDUPTHER

## 2025-03-07 RX ORDER — LIDOCAINE HYDROCHLORIDE 10 MG/ML
INJECTION, SOLUTION EPIDURAL; INFILTRATION; INTRACAUDAL; PERINEURAL PRN
Status: COMPLETED | OUTPATIENT
Start: 2025-03-07 | End: 2025-03-07

## 2025-03-07 RX ORDER — MISOPROSTOL 200 UG/1
800 TABLET ORAL ONCE
Status: COMPLETED | OUTPATIENT
Start: 2025-03-07 | End: 2025-03-07

## 2025-03-07 RX ORDER — NALBUPHINE HYDROCHLORIDE 10 MG/ML
5 INJECTION INTRAMUSCULAR; INTRAVENOUS; SUBCUTANEOUS EVERY 4 HOURS PRN
Status: DISCONTINUED | OUTPATIENT
Start: 2025-03-07 | End: 2025-03-08

## 2025-03-07 RX ORDER — ALBUMIN HUMAN 50 G/1000ML
25 SOLUTION INTRAVENOUS ONCE
Status: COMPLETED | OUTPATIENT
Start: 2025-03-07 | End: 2025-03-07

## 2025-03-07 RX ORDER — HYDROMORPHONE HYDROCHLORIDE 1 MG/ML
0.5 INJECTION, SOLUTION INTRAMUSCULAR; INTRAVENOUS; SUBCUTANEOUS EVERY 5 MIN PRN
Status: DISCONTINUED | OUTPATIENT
Start: 2025-03-07 | End: 2025-03-07

## 2025-03-07 RX ORDER — NITROGLYCERIN 20 MG/100ML
INJECTION INTRAVENOUS PRN
Status: COMPLETED | OUTPATIENT
Start: 2025-03-07 | End: 2025-03-07

## 2025-03-07 RX ORDER — OXYCODONE HYDROCHLORIDE 5 MG/1
5 TABLET ORAL
Status: DISCONTINUED | OUTPATIENT
Start: 2025-03-07 | End: 2025-03-08

## 2025-03-07 RX ORDER — CALCIUM GLUCONATE 20 MG/ML
1000 INJECTION, SOLUTION INTRAVENOUS ONCE
Status: COMPLETED | OUTPATIENT
Start: 2025-03-07 | End: 2025-03-07

## 2025-03-07 RX ORDER — NALOXONE HYDROCHLORIDE 0.4 MG/ML
INJECTION, SOLUTION INTRAMUSCULAR; INTRAVENOUS; SUBCUTANEOUS PRN
Status: DISCONTINUED | OUTPATIENT
Start: 2025-03-07 | End: 2025-03-12 | Stop reason: HOSPADM

## 2025-03-07 RX ORDER — HYDROMORPHONE HYDROCHLORIDE 1 MG/ML
1 INJECTION, SOLUTION INTRAMUSCULAR; INTRAVENOUS; SUBCUTANEOUS
Status: DISCONTINUED | OUTPATIENT
Start: 2025-03-07 | End: 2025-03-08

## 2025-03-07 RX ADMIN — SODIUM CHLORIDE, POTASSIUM CHLORIDE, SODIUM LACTATE AND CALCIUM CHLORIDE: 600; 310; 30; 20 INJECTION, SOLUTION INTRAVENOUS at 01:59

## 2025-03-07 RX ADMIN — EPHEDRINE SULFATE 5 MG: 50 INJECTION INTRAVENOUS at 00:02

## 2025-03-07 RX ADMIN — CLINDAMYCIN PHOSPHATE 900 MG: 900 INJECTION, SOLUTION INTRAVENOUS at 14:11

## 2025-03-07 RX ADMIN — ALBUMIN (HUMAN) 25 G: 12.5 INJECTION, SOLUTION INTRAVENOUS at 00:48

## 2025-03-07 RX ADMIN — ALBUMIN (HUMAN) 250 ML: 12.5 INJECTION, SOLUTION INTRAVENOUS at 02:19

## 2025-03-07 RX ADMIN — WATER 2000 MG: 1 INJECTION INTRAMUSCULAR; INTRAVENOUS; SUBCUTANEOUS at 16:02

## 2025-03-07 RX ADMIN — MISOPROSTOL 800 MCG: 200 TABLET ORAL at 04:15

## 2025-03-07 RX ADMIN — HYDROMORPHONE HYDROCHLORIDE 1 MG: 1 INJECTION, SOLUTION INTRAMUSCULAR; INTRAVENOUS; SUBCUTANEOUS at 16:00

## 2025-03-07 RX ADMIN — ACETAMINOPHEN 1000 MG: 500 TABLET ORAL at 16:01

## 2025-03-07 RX ADMIN — NITROGLYCERIN 50 MCG: 20 INJECTION INTRAVENOUS at 02:50

## 2025-03-07 RX ADMIN — Medication 120 MCG: at 01:20

## 2025-03-07 RX ADMIN — ACETAMINOPHEN 1000 MG: 500 TABLET ORAL at 21:29

## 2025-03-07 RX ADMIN — Medication 160 MCG: at 02:34

## 2025-03-07 RX ADMIN — TRANEXAMIC ACID 1000 MG: 100 INJECTION INTRAVENOUS at 05:03

## 2025-03-07 RX ADMIN — CARBOPROST TROMETHAMINE 250 MCG: 250 INJECTION, SOLUTION INTRAMUSCULAR at 01:34

## 2025-03-07 RX ADMIN — NITROGLYCERIN 25 MCG: 20 INJECTION INTRAVENOUS at 02:31

## 2025-03-07 RX ADMIN — CALCIUM GLUCONATE 1000 MG: 20 INJECTION, SOLUTION INTRAVENOUS at 06:12

## 2025-03-07 RX ADMIN — LIDOCAINE HYDROCHLORIDE 5 ML: 10 INJECTION, SOLUTION EPIDURAL; INFILTRATION; INTRACAUDAL; PERINEURAL at 02:06

## 2025-03-07 RX ADMIN — Medication 120 MCG: at 02:04

## 2025-03-07 RX ADMIN — IOPAMIDOL 189 ML: 755 INJECTION, SOLUTION INTRAVENOUS at 02:53

## 2025-03-07 RX ADMIN — EPHEDRINE SULFATE 10 MG: 50 INJECTION INTRAVENOUS at 01:05

## 2025-03-07 RX ADMIN — SODIUM BICARBONATE 50 MEQ: 84 INJECTION, SOLUTION INTRAVENOUS at 05:53

## 2025-03-07 RX ADMIN — OXYCODONE 5 MG: 5 TABLET ORAL at 23:42

## 2025-03-07 RX ADMIN — DEXTROSE MONOHYDRATE: 100 INJECTION, SOLUTION INTRAVENOUS at 06:08

## 2025-03-07 RX ADMIN — WATER 2000 MG: 1 INJECTION INTRAMUSCULAR; INTRAVENOUS; SUBCUTANEOUS at 08:04

## 2025-03-07 RX ADMIN — FUROSEMIDE 80 MG: 10 INJECTION, SOLUTION INTRAMUSCULAR; INTRAVENOUS at 16:09

## 2025-03-07 RX ADMIN — EPHEDRINE SULFATE 10 MG: 50 INJECTION INTRAVENOUS at 00:45

## 2025-03-07 RX ADMIN — SODIUM CHLORIDE, PRESERVATIVE FREE 10 ML: 5 INJECTION INTRAVENOUS at 07:54

## 2025-03-07 RX ADMIN — CLINDAMYCIN PHOSPHATE 900 MG: 900 INJECTION, SOLUTION INTRAVENOUS at 20:19

## 2025-03-07 RX ADMIN — Medication 120 MCG: at 02:43

## 2025-03-07 RX ADMIN — Medication 120 MCG: at 02:29

## 2025-03-07 RX ADMIN — AMPICILLIN SODIUM 2000 MG: 2 INJECTION, POWDER, FOR SOLUTION INTRAMUSCULAR; INTRAVENOUS at 08:06

## 2025-03-07 ASSESSMENT — PAIN DESCRIPTION - DESCRIPTORS: DESCRIPTORS: STABBING

## 2025-03-07 ASSESSMENT — PAIN - FUNCTIONAL ASSESSMENT: PAIN_FUNCTIONAL_ASSESSMENT: ACTIVITIES ARE NOT PREVENTED

## 2025-03-07 ASSESSMENT — PAIN DESCRIPTION - LOCATION: LOCATION: ABDOMEN

## 2025-03-07 ASSESSMENT — PAIN SCALES - GENERAL
PAINLEVEL_OUTOF10: 2
PAINLEVEL_OUTOF10: 7
PAINLEVEL_OUTOF10: 4

## 2025-03-07 ASSESSMENT — PAIN DESCRIPTION - ORIENTATION: ORIENTATION: LOWER

## 2025-03-07 NOTE — ANESTHESIA PRE PROCEDURE
\"PREGTESTUR\", \"PREGSERUM\", \"HCG\", \"HCGQUANT\"     ABGs: No results found for: \"PHART\", \"PO2ART\", \"FHR1MWG\", \"PPG2MSS\", \"BEART\", \"V4YCSWWA\"     Type & Screen (If Applicable):  Lab Results   Component Value Date    ABORH O POSITIVE 03/05/2025    LABANTI NEG 03/05/2025       Drug/Infectious Status (If Applicable):  Lab Results   Component Value Date/Time    HEPCAB 0.10 08/27/2024 03:40 PM       COVID-19 Screening (If Applicable):   Lab Results   Component Value Date/Time    COVID19 Not-Detected 09/27/2024 11:17 AM           Anesthesia Evaluation  Patient summary reviewed and Nursing notes reviewed  Airway: Mallampati: II  TM distance: >3 FB   Neck ROM: full  Mouth opening: > = 3 FB   Dental: normal exam         Pulmonary:Negative Pulmonary ROS breath sounds clear to auscultation                             Cardiovascular:Negative CV ROS  Exercise tolerance: good (>4 METS)          Rhythm: regular  Rate: normal                    Neuro/Psych:   Negative Neuro/Psych ROS              GI/Hepatic/Renal: Neg GI/Hepatic/Renal ROS            Endo/Other:    (+) Diabetes.                 Abdominal: normal exam            Vascular: negative vascular ROS.         Other Findings:             Anesthesia Plan      MAC     ASA 2 - emergent       Induction: intravenous.      Anesthetic plan and risks discussed with Unable to obtain due to emergent nature.      Plan discussed with CRNA.                    Nelson Og MD   3/7/2025

## 2025-03-07 NOTE — CONSENT
Informed Consent for Blood Component Transfusion Note    I have discussed with the patient the rationale for blood component transfusion; its benefits in treating or preventing fatigue, organ damage, or death; and its risk which includes mild transfusion reactions, rare risk of blood borne infection, or more serious but rare reactions. I have discussed the alternatives to transfusion, including the risk and consequences of not receiving transfusion. The patient had an opportunity to ask questions and had agreed to proceed with transfusion of blood components.    Electronically signed by Mira Pate MD on 3/7/25 at 12:19 AM EST

## 2025-03-07 NOTE — PROCEDURES
Interventional Radiology  Procedure Note        3/7/2025 2:55 AM    Patient: Tyrese King     Informed consent obtained    Diagnosis: postpartum hemorrhage    Procedure(s): pelvic angiography shows active bleeding from a distal branch of the left uterine artery. Bilateral uterine artery embolization with gelfoam was performed, to stasis on the left, and to sluggish flow on the right.  R CFA closure with 5Fr Celt    Estimated blood loss: less than 5cc    Anesthesia: local    Specimens removed:  none    Complications: None    Implants: None    Primary Physician: Glen Murillo MD    Recommendations: N/A    Full dictated report to follow    Glen Murillo MD  Interventional Radiology  [facility]  2:55 AM, 3/7/2025

## 2025-03-07 NOTE — ANESTHESIA POSTPROCEDURE EVALUATION
Post-Anesthesia Evaluation and Assessment    Patient: Tyrese King MRN: [id number]  SSN: [id number]    YOB: 1990  Age: 34 y.o.  Sex: female      I have evaluated the patient and they are stable and ready for discharge from the PACU.     Cardiovascular Function/Vital Signs  Visit Vitals  BP (!) 104/51   Pulse (!) 110   Temp 98.6 °F (37 °C)   Resp 20   SpO2 100%       Patient is status post * No anesthesia type entered * anesthesia for * No procedures listed *.    Nausea/Vomiting: None    Postoperative hydration reviewed and adequate.    Pain:  Managed    Neurological Status:   At baseline    Mental Status, Level of Consciousness: Alert and  oriented to person, place, and time    Pulmonary Status:   Adequate oxygenation and airway patent    Complications related to anesthesia: None    Post-anesthesia assessment completed. No concerns    Signed By: Nelson Og MD     March 7, 2025

## 2025-03-07 NOTE — CONSULTS
Name: Tyrese King   : 1990   MRN: [id number]   Date: 3/7/2025        No chief complaint on file.        HPI:   35 y/o female with onset of labor on 3/6 at 18:00 delivered by C section. Post procedure she developed hypotension due to blood loss. Pt seen B Critical care at the time and hypotension was responding to volume from blood transfusion with BP increasing and HR decreasing.     Since pt was improving and plan was for IR to embolize uterine artery decision made to leave in OB. Post procedure contacted by OB Hospitalist because BP still soft and H/H was 4.1/12.6    Active Problems Being Managed:     Anemia of acute blood loss post partum  GIOVANI  Metabolic acidosis  Hypocalcemia    Assessment/Plan:       - Neuro at baseline  - Mild tachycardia and hypotension due to acute blood loss anemia. Now has source control post embolization.  - MAP goal > 65  - continue volume transfusion of PRBC for Hgb > 7  - SPO2 goal > 92%  - Cr 1.58 which is mildly elevated; pt has h/o 1 kidney  - Bicarb is 15  - Ca 6.9 but corrected for albumin is 8.5  - Give amp of bicarb and 1 g of calcium gluconate since she is receiving multiple transfusions.  - pt received a dose of azithromycin and is now on scheduled ancef, ampicillin and gentamicin; will discuss need for all abx with OB and need to Renally adjust.  - pt had gestational diabetes so check accuchecks and use ISS  - monitor H/H q6h  - check coags, fibrinogen and TEG      DVT prophylaxis: hold for bleeding  SUP: Received famotidine x1; currently NA  Code status: Full    Next of kin: Mayo Cazares (Spouse)  [phone] (Mobile)     I personally spent 65 minutes of critical care time.  This is time spent at this critically ill patient's bedside actively involved in patient care as well as the coordination of care and discussions with the patient's family.  This does not include any procedural time which has been billed separately.        Review of systems:     Review of

## 2025-03-07 NOTE — OP NOTE
Department of Obstetrics and Gynecology   Section Note    Indications: arrest of dilation and descent    Pre-operative Diagnosis: 39 week 5 day pregnancy.    Post-operative Diagnosis: Living  infant(s) and Male    Surgeon: Mira Pate MD     Assistants: Neelam Saravia RN    Anesthesia: Epidural anesthesia    Procedure Details   The patient was seen in the labor room. The risks, benefits, complications, treatment options, and expected outcomes were discussed with the patient.  The patient concurred with the proposed plan, giving informed consent.  The site of surgery properly noted/marked. The patient was taken to Operating Room # 3, identified as [facility] and the procedure verified as  Delivery. A Time Out was held and the above information confirmed.    After induction of anesthesia, the patient was draped and prepped in the usual sterile manner. A Pfannenstiel incision was made and her old keloid incision was removed. Incision was carried down through the subcutaneous tissue to the fascia. Fascial incision was made and extended transversely. The fascia was  from the underlying rectus tissue superiorly and inferiorly. The peritoneum was identified and entered taking care not to injure nay underlying structures. Peritoneal incision was extended longitudinally. The utero-vesical peritoneal reflection was incised transversely and the bladder flap was bluntly freed from the lower uterine segment. A low transverse uterine incision was made. Delivered from cephalic presentation was a baby boy. He was shown to parents. Cord was clamped and cut after 30 seconds and baby handed to waiting NICU team. Cord letf long for  to cut. He was OP and synclitic.  After the umbilical cord was clamped and cut cord blood was obtained for evaluation. The placenta was removed intact and appeared normal. 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 The tubes and ovaries appeared normal. The uterus was completely

## 2025-03-07 NOTE — H&P
Radiology History and Physical    Patient: Tyrese King 34 y.o. female       Chief Complaint: No chief complaint on file.      History of Present Illness: 34 year old woman with postpartum hemorrhage after  earlier tonight. Hypotensive, requiring pressor support and multiple transfusions.    History:    Past Medical History:   Diagnosis Date    Diabetes (HCC)     Yes. Gestational on Metformin    Gestational diabetes     Kidney congenitally absent, right     Only left kidney present. Enlarged good function     No family history on file.  Social History     Socioeconomic History    Marital status:      Spouse name: Not on file    Number of children: Not on file    Years of education: Not on file    Highest education level: Not on file   Occupational History    Not on file   Tobacco Use    Smoking status: Never    Smokeless tobacco: Never   Vaping Use    Vaping status: Never Used   Substance and Sexual Activity    Alcohol use: Not Currently    Drug use: Never    Sexual activity: Yes     Partners: Male     Birth control/protection: None   Other Topics Concern    Not on file   Social History Narrative    Not on file     Social Determinants of Health     Financial Resource Strain: Low Risk  (2024)    Overall Financial Resource Strain (CARDIA)     Difficulty of Paying Living Expenses: Not hard at all   Food Insecurity: No Food Insecurity (3/5/2025)    Hunger Vital Sign     Worried About Running Out of Food in the Last Year: Never true     Ran Out of Food in the Last Year: Never true   Transportation Needs: No Transportation Needs (3/5/2025)    PRAPARE - Transportation     Lack of Transportation (Medical): No     Lack of Transportation (Non-Medical): No   Physical Activity: Not on file   Stress: Not on file   Social Connections: Not on file   Intimate Partner Violence: Not on file   Housing Stability: Low Risk  (3/5/2025)    Housing Stability Vital Sign     Unable to Pay for Housing in the Last Year: No     
of Times Moved in the Last Year: 0      Homeless in the Last Year: No               ALLERGIES: Macadamia nut oil, Soy, [place] oil bitter flavor [flavoring agent (non-screening)], Blueberry, and Cherry     Review of Systems   Constitutional: Negative.    HENT: Negative.     Eyes: Negative.    Gastrointestinal:  Positive for abdominal pain.   Endocrine: Negative.    Genitourinary:  Positive for vaginal discharge.   Allergic/Immunologic: Negative.    Neurological: Negative.    Hematological: Negative.    Psychiatric/Behavioral: Negative.           Vitals   There were no vitals filed for this visit.             Physical Exam  Vitals and nursing note reviewed. Exam conducted with a chaperone present.   Constitutional:       Appearance: Normal appearance.   HENT:      Head: Normocephalic and atraumatic.      Nose: Nose normal.      Mouth/Throat:      Pharynx: Oropharynx is clear.   Eyes:      Extraocular Movements: Extraocular movements intact.      Pupils: Pupils are equal, round, and reactive to light.   Cardiovascular:      Rate and Rhythm: Normal rate and regular rhythm.      Pulses: Normal pulses.      Heart sounds: Normal heart sounds.   Pulmonary:      Effort: Pulmonary effort is normal.      Breath sounds: Normal breath sounds.   Abdominal:      Comments: Gravid, relaxed in between contractions   Genitourinary:     Comments: Cervix 1 cm loose , membranes flushed with the head, +mucus with pink stained/70/-2  Musculoskeletal:         General: Normal range of motion.      Cervical back: Normal range of motion and neck supple.   Neurological:      General: No focal deficit present.      Mental Status: She is alert and oriented to person, place, and time.   Psychiatric:         Mood and Affect: Mood normal.         Behavior: Behavior normal.            MDM  Number of Diagnoses or Management Options  Diagnosis management comments: IUP@ 39 weeks 4 days in early labor           NST Interpretation  Baseline 140  Accels

## 2025-03-07 NOTE — LACTATION NOTE
This note was copied from a baby's chart.  Initial Lactation consultation - baby born by  last night to a  mom at 39 5/7 weeks gestation. Mom states she breast fed her first child for 20 months. Mom currently in ICU. She hemorrhaged after delivery and had to get blood and be monitored closely. Baby has been getting donor milk during the night.     I helped mom with a feeding this morning. Baby latched quickly on both breasts. He was sucking rhythmically with some swallows noted. I set mom up with the hospital grade breast pump and showed parents how to use it. She does not feel up to pump at this time. She plans to continue to put the baby to the breast and I recommended she pump afterwards for extra breast stimulation. Any milk collected will be given to the baby before any donor milk is given. Baby will be given a small amount of supplementation after nursing.

## 2025-03-07 NOTE — ANESTHESIA POSTPROCEDURE EVALUATION
Post-Anesthesia Evaluation and Assessment    Patient: Tyrese King MRN: [id number]  SSN: [id number]    YOB: 1990  Age: 34 y.o.  Sex: female      I have evaluated the patient and they are stable and ready for discharge from the PACU.     Cardiovascular Function/Vital Signs  Visit Vitals  BP (!) 104/51   Pulse (!) 110   Temp 98.6 °F (37 °C)   Resp 20   SpO2 100%       Patient is status post Epidural anesthesia for Procedure(s):   SECTION.    Nausea/Vomiting: None    Postoperative hydration reviewed and adequate.    Pain:  Managed    Neurological Status:   At baseline    Mental Status, Level of Consciousness: Alert and  oriented to person, place, and time    Pulmonary Status:   Adequate oxygenation and airway patent    Complications related to anesthesia: None    Post-anesthesia assessment completed. No concerns    Signed By: Nelson Og MD     2025

## 2025-03-07 NOTE — L&D DELIVERY NOTE
LETTY King [[id number]]      Labor Events     Labor: No  Cervical Ripening Date/Time:      Antibiotics Received during Labor: Yes  Rupture Date/Time:  3/6/25 07:45:00   Rupture Type: AROM, Intact  Fluid Color: Meconium  Fluid Odor: None  Fluid Volume: Moderate  Induction: None  Augmentation: AROM, Oxytocin       Anesthesia    Method: Epidural       Labor Event Times      Labor onset date/time:  3/5/25 18:00:00     Dilation complete date/time:        Start pushing date/time:     Decision date/time (emergent ):            Delivery Details      Delivery Date: 3/6/25 Delivery Time: 21:49:10   Delivery Type: , Classical  Trial of Labor?: Yes   Categorization: Repeat   Priority: unscheduled  Indications for : Second Stage Arrest       Skin Incision Type: Low Transverse  Uterine Incision: Low Transverse       Weir Presentation    Presentation: Vertex       Shoulder Dystocia    Shoulder Dystocia Present?: No       Assisted Delivery Details    Forceps Attempted?: No  Vacuum Extractor Attempted?: No                           Cord    Vessels: 3 Vessels  Complications: None  Delayed Cord Clamping?: Yes  Cord Clamped Date/Time: 3/6/2025 21:50:20  Cord Blood Disposition: Lab  Gases Sent?: No              Placenta    Date/Time: 3/6/2025 21:50:04  Removal: Spontaneous  Appearance: Intact  Disposition: Discarded       Lacerations           Vaginal Counts    Initial Count Personnel: DAVON DIOP RN  Initial Count Verified By: NAVEEN CLEMENS RN  Intial Sponge Count: Correct  Intial Instruments Count: Correct          Blood Loss  Mother: Tyrese King #[id number]     Start of Mother's Information      Delivery Blood Loss   Intrapartum & Postpartum: 25 1800 - 25 2242    Delivery Admission: 25 1217 - 25 2242         Intrapartum & Postpartum Delivery Admission    None                  End of Mother's Information  Mother: Tyrese King #[id number]                Delivery Providers

## 2025-03-08 LAB
ABO + RH BLD: NORMAL
ANION GAP SERPL CALC-SCNC: 8 MMOL/L (ref 2–12)
APTT PPP: 34 SEC (ref 22.1–31)
BLD PROD TYP BPU: NORMAL
BLOOD BANK BLOOD PRODUCT EXPIRATION DATE: NORMAL
BLOOD BANK DISPENSE STATUS: NORMAL
BLOOD BANK ISBT PRODUCT BLOOD TYPE: 5100
BLOOD BANK ISBT PRODUCT BLOOD TYPE: 6200
BLOOD BANK ISBT PRODUCT BLOOD TYPE: 7300
BLOOD BANK PRODUCT CODE: NORMAL
BLOOD BANK UNIT TYPE AND RH: NORMAL
BLOOD GROUP ANTIBODIES SERPL: NORMAL
BPU ID: NORMAL
BUN SERPL-MCNC: 11 MG/DL (ref 6–20)
BUN/CREAT SERPL: 12 (ref 12–20)
CALCIUM SERPL-MCNC: 8.2 MG/DL (ref 8.5–10.1)
CHLORIDE SERPL-SCNC: 99 MMOL/L (ref 97–108)
CIT FUNC FIB MAX AMP: 23.8 MM (ref 15–32)
CIT RAPIDTEG MAX AMP: 64.5 MM (ref 52–70)
CITRATED KAOLIN LY30: 0 % (ref 0–2.6)
CITRATED KAOLIN R TIME: 3.5 MINS (ref 4.6–9.1)
CO2 SERPL-SCNC: 23 MMOL/L (ref 21–32)
COMMENT:: NORMAL
CREAT SERPL-MCNC: 0.89 MG/DL (ref 0.55–1.02)
CROSSMATCH RESULT: NORMAL
ERYTHROCYTE [DISTWIDTH] IN BLOOD BY AUTOMATED COUNT: 16 % (ref 11.5–14.5)
ERYTHROCYTE [DISTWIDTH] IN BLOOD BY AUTOMATED COUNT: 16.1 % (ref 11.5–14.5)
FIBRINOGEN PPP-MCNC: 388 MG/DL (ref 200–475)
GLUCOSE SERPL-MCNC: 107 MG/DL (ref 65–100)
HCT VFR BLD AUTO: 23.1 % (ref 35–47)
HCT VFR BLD AUTO: 24.8 % (ref 35–47)
HGB BLD-MCNC: 7.9 G/DL (ref 11.5–16)
HGB BLD-MCNC: 8.4 G/DL (ref 11.5–16)
INR PPP: 1 (ref 0.9–1.1)
MAGNESIUM SERPL-MCNC: 1.4 MG/DL (ref 1.6–2.4)
MCH RBC QN AUTO: 28.2 PG (ref 26–34)
MCH RBC QN AUTO: 29.2 PG (ref 26–34)
MCHC RBC AUTO-ENTMCNC: 33.9 G/DL (ref 30–36.5)
MCHC RBC AUTO-ENTMCNC: 34.2 G/DL (ref 30–36.5)
MCV RBC AUTO: 83.2 FL (ref 80–99)
MCV RBC AUTO: 85.2 FL (ref 80–99)
NRBC # BLD: 0 K/UL (ref 0–0.01)
NRBC # BLD: 0 K/UL (ref 0–0.01)
NRBC BLD-RTO: 0 PER 100 WBC
NRBC BLD-RTO: 0 PER 100 WBC
PHOSPHATE SERPL-MCNC: 3.1 MG/DL (ref 2.6–4.7)
PLATELET # BLD AUTO: 114 K/UL (ref 150–400)
PLATELET # BLD AUTO: 44 K/UL (ref 150–400)
PMV BLD AUTO: 10.3 FL (ref 8.9–12.9)
PMV BLD AUTO: 10.5 FL (ref 8.9–12.9)
POTASSIUM SERPL-SCNC: 4.4 MMOL/L (ref 3.5–5.1)
PROTHROMBIN TIME: 10.5 SEC (ref 9.2–11.2)
RBC # BLD AUTO: 2.71 M/UL (ref 3.8–5.2)
RBC # BLD AUTO: 2.98 M/UL (ref 3.8–5.2)
SODIUM SERPL-SCNC: 130 MMOL/L (ref 136–145)
SPECIMEN EXP DATE BLD: NORMAL
SPECIMEN HOLD: NORMAL
THERAPEUTIC RANGE: ABNORMAL SECS (ref 58–77)
UNIT DIVISION: 0
UNIT ISSUE DATE/TIME: NORMAL
WBC # BLD AUTO: 12.8 K/UL (ref 3.6–11)
WBC # BLD AUTO: 14.5 K/UL (ref 3.6–11)

## 2025-03-08 PROCEDURE — 30233R1 TRANSFUSION OF NONAUTOLOGOUS PLATELETS INTO PERIPHERAL VEIN, PERCUTANEOUS APPROACH: ICD-10-PCS | Performed by: OBSTETRICS & GYNECOLOGY

## 2025-03-08 PROCEDURE — 6370000000 HC RX 637 (ALT 250 FOR IP): Performed by: OBSTETRICS & GYNECOLOGY

## 2025-03-08 PROCEDURE — 6370000000 HC RX 637 (ALT 250 FOR IP): Performed by: INTERNAL MEDICINE

## 2025-03-08 PROCEDURE — 36430 TRANSFUSION BLD/BLD COMPNT: CPT

## 2025-03-08 PROCEDURE — 6360000002 HC RX W HCPCS: Performed by: OBSTETRICS & GYNECOLOGY

## 2025-03-08 PROCEDURE — 80048 BASIC METABOLIC PNL TOTAL CA: CPT

## 2025-03-08 PROCEDURE — 83735 ASSAY OF MAGNESIUM: CPT

## 2025-03-08 PROCEDURE — 2500000003 HC RX 250 WO HCPCS: Performed by: SURGERY

## 2025-03-08 PROCEDURE — 85610 PROTHROMBIN TIME: CPT

## 2025-03-08 PROCEDURE — 84100 ASSAY OF PHOSPHORUS: CPT

## 2025-03-08 PROCEDURE — 1120000000 HC RM PRIVATE OB

## 2025-03-08 PROCEDURE — 85347 COAGULATION TIME ACTIVATED: CPT

## 2025-03-08 PROCEDURE — 85730 THROMBOPLASTIN TIME PARTIAL: CPT

## 2025-03-08 PROCEDURE — 85027 COMPLETE CBC AUTOMATED: CPT

## 2025-03-08 PROCEDURE — 2500000003 HC RX 250 WO HCPCS: Performed by: OBSTETRICS & GYNECOLOGY

## 2025-03-08 PROCEDURE — P9073 PLATELETS PHERESIS PATH REDU: HCPCS

## 2025-03-08 PROCEDURE — 85384 FIBRINOGEN ACTIVITY: CPT

## 2025-03-08 PROCEDURE — 85390 FIBRINOLYSINS SCREEN I&R: CPT

## 2025-03-08 PROCEDURE — 85576 BLOOD PLATELET AGGREGATION: CPT

## 2025-03-08 PROCEDURE — 99024 POSTOP FOLLOW-UP VISIT: CPT | Performed by: OBSTETRICS & GYNECOLOGY

## 2025-03-08 PROCEDURE — 6360000002 HC RX W HCPCS: Performed by: INTERNAL MEDICINE

## 2025-03-08 PROCEDURE — 6360000002 HC RX W HCPCS: Performed by: PHYSICIAN ASSISTANT

## 2025-03-08 RX ORDER — SODIUM CHLORIDE, SODIUM LACTATE, POTASSIUM CHLORIDE, CALCIUM CHLORIDE 600; 310; 30; 20 MG/100ML; MG/100ML; MG/100ML; MG/100ML
INJECTION, SOLUTION INTRAVENOUS CONTINUOUS
Status: DISCONTINUED | OUTPATIENT
Start: 2025-03-08 | End: 2025-03-12 | Stop reason: HOSPADM

## 2025-03-08 RX ORDER — ONDANSETRON 2 MG/ML
4 INJECTION INTRAMUSCULAR; INTRAVENOUS EVERY 6 HOURS PRN
Status: DISCONTINUED | OUTPATIENT
Start: 2025-03-08 | End: 2025-03-12 | Stop reason: HOSPADM

## 2025-03-08 RX ORDER — POLYETHYLENE GLYCOL 3350 17 G/17G
17 POWDER, FOR SOLUTION ORAL DAILY
Status: DISCONTINUED | OUTPATIENT
Start: 2025-03-08 | End: 2025-03-12 | Stop reason: HOSPADM

## 2025-03-08 RX ORDER — OXYCODONE HYDROCHLORIDE 5 MG/1
5 TABLET ORAL EVERY 4 HOURS PRN
Status: DISCONTINUED | OUTPATIENT
Start: 2025-03-08 | End: 2025-03-12 | Stop reason: HOSPADM

## 2025-03-08 RX ORDER — SODIUM CHLORIDE 0.9 % (FLUSH) 0.9 %
5-40 SYRINGE (ML) INJECTION EVERY 12 HOURS SCHEDULED
Status: DISCONTINUED | OUTPATIENT
Start: 2025-03-08 | End: 2025-03-12 | Stop reason: HOSPADM

## 2025-03-08 RX ORDER — KETOROLAC TROMETHAMINE 30 MG/ML
30 INJECTION, SOLUTION INTRAMUSCULAR; INTRAVENOUS EVERY 6 HOURS
Status: DISCONTINUED | OUTPATIENT
Start: 2025-03-08 | End: 2025-03-08

## 2025-03-08 RX ORDER — IBUPROFEN 400 MG/1
800 TABLET, FILM COATED ORAL EVERY 8 HOURS PRN
Status: DISCONTINUED | OUTPATIENT
Start: 2025-03-08 | End: 2025-03-10 | Stop reason: SDUPTHER

## 2025-03-08 RX ORDER — DOCUSATE SODIUM 100 MG/1
100 CAPSULE, LIQUID FILLED ORAL 2 TIMES DAILY
Status: DISCONTINUED | OUTPATIENT
Start: 2025-03-08 | End: 2025-03-12 | Stop reason: HOSPADM

## 2025-03-08 RX ORDER — MODIFIED LANOLIN
OINTMENT (GRAM) TOPICAL
Status: DISCONTINUED | OUTPATIENT
Start: 2025-03-08 | End: 2025-03-12 | Stop reason: HOSPADM

## 2025-03-08 RX ORDER — CLINDAMYCIN HYDROCHLORIDE 150 MG/1
600 CAPSULE ORAL EVERY 8 HOURS SCHEDULED
Status: DISCONTINUED | OUTPATIENT
Start: 2025-03-08 | End: 2025-03-09

## 2025-03-08 RX ORDER — MAGNESIUM SULFATE IN WATER 40 MG/ML
4000 INJECTION, SOLUTION INTRAVENOUS ONCE
Status: DISCONTINUED | OUTPATIENT
Start: 2025-03-08 | End: 2025-03-08

## 2025-03-08 RX ORDER — SODIUM CHLORIDE 9 MG/ML
INJECTION, SOLUTION INTRAVENOUS PRN
Status: DISCONTINUED | OUTPATIENT
Start: 2025-03-08 | End: 2025-03-10 | Stop reason: SDUPTHER

## 2025-03-08 RX ORDER — SODIUM CHLORIDE 0.9 % (FLUSH) 0.9 %
5-40 SYRINGE (ML) INJECTION PRN
Status: DISCONTINUED | OUTPATIENT
Start: 2025-03-08 | End: 2025-03-12 | Stop reason: HOSPADM

## 2025-03-08 RX ORDER — MAGNESIUM SULFATE IN WATER 40 MG/ML
2000 INJECTION, SOLUTION INTRAVENOUS ONCE
Status: COMPLETED | OUTPATIENT
Start: 2025-03-08 | End: 2025-03-08

## 2025-03-08 RX ORDER — ACETAMINOPHEN 500 MG
1000 TABLET ORAL EVERY 8 HOURS SCHEDULED
Status: DISCONTINUED | OUTPATIENT
Start: 2025-03-08 | End: 2025-03-08 | Stop reason: SDUPTHER

## 2025-03-08 RX ORDER — ONDANSETRON 4 MG/1
4 TABLET, ORALLY DISINTEGRATING ORAL EVERY 8 HOURS PRN
Status: DISCONTINUED | OUTPATIENT
Start: 2025-03-08 | End: 2025-03-12 | Stop reason: HOSPADM

## 2025-03-08 RX ORDER — OXYCODONE HYDROCHLORIDE 5 MG/1
10 TABLET ORAL EVERY 4 HOURS PRN
Status: DISCONTINUED | OUTPATIENT
Start: 2025-03-08 | End: 2025-03-12 | Stop reason: HOSPADM

## 2025-03-08 RX ORDER — IBUPROFEN 400 MG/1
800 TABLET, FILM COATED ORAL EVERY 8 HOURS
Status: DISCONTINUED | OUTPATIENT
Start: 2025-03-09 | End: 2025-03-08

## 2025-03-08 RX ORDER — IBUPROFEN 400 MG/1
800 TABLET, FILM COATED ORAL EVERY 8 HOURS
Status: DISCONTINUED | OUTPATIENT
Start: 2025-03-09 | End: 2025-03-12 | Stop reason: HOSPADM

## 2025-03-08 RX ORDER — SODIUM CHLORIDE 9 MG/ML
INJECTION, SOLUTION INTRAVENOUS PRN
Status: DISCONTINUED | OUTPATIENT
Start: 2025-03-08 | End: 2025-03-12 | Stop reason: HOSPADM

## 2025-03-08 RX ADMIN — ACETAMINOPHEN 1000 MG: 500 TABLET ORAL at 04:00

## 2025-03-08 RX ADMIN — MAGNESIUM SULFATE HEPTAHYDRATE 2000 MG: 40 INJECTION, SOLUTION INTRAVENOUS at 04:03

## 2025-03-08 RX ADMIN — OXYCODONE 5 MG: 5 TABLET ORAL at 08:15

## 2025-03-08 RX ADMIN — SODIUM CHLORIDE, PRESERVATIVE FREE 10 ML: 5 INJECTION INTRAVENOUS at 09:34

## 2025-03-08 RX ADMIN — WATER 2000 MG: 1 INJECTION INTRAMUSCULAR; INTRAVENOUS; SUBCUTANEOUS at 08:18

## 2025-03-08 RX ADMIN — CLINDAMYCIN PHOSPHATE 900 MG: 900 INJECTION, SOLUTION INTRAVENOUS at 11:11

## 2025-03-08 RX ADMIN — OXYCODONE 10 MG: 5 TABLET ORAL at 22:57

## 2025-03-08 RX ADMIN — POLYETHYLENE GLYCOL 3350 17 G: 17 POWDER, FOR SOLUTION ORAL at 11:05

## 2025-03-08 RX ADMIN — IBUPROFEN 800 MG: 400 TABLET, FILM COATED ORAL at 19:17

## 2025-03-08 RX ADMIN — CLINDAMYCIN PHOSPHATE 900 MG: 900 INJECTION, SOLUTION INTRAVENOUS at 03:45

## 2025-03-08 RX ADMIN — CLINDAMYCIN HYDROCHLORIDE 600 MG: 150 CAPSULE ORAL at 21:15

## 2025-03-08 RX ADMIN — ACETAMINOPHEN 1000 MG: 500 TABLET ORAL at 16:19

## 2025-03-08 RX ADMIN — WATER 2000 MG: 1 INJECTION INTRAMUSCULAR; INTRAVENOUS; SUBCUTANEOUS at 00:00

## 2025-03-08 RX ADMIN — OXYCODONE 5 MG: 5 TABLET ORAL at 04:00

## 2025-03-08 RX ADMIN — HYDROMORPHONE HYDROCHLORIDE 1 MG: 1 INJECTION, SOLUTION INTRAMUSCULAR; INTRAVENOUS; SUBCUTANEOUS at 02:10

## 2025-03-08 RX ADMIN — MAGNESIUM SULFATE HEPTAHYDRATE 2000 MG: 40 INJECTION, SOLUTION INTRAVENOUS at 11:13

## 2025-03-08 RX ADMIN — IBUPROFEN 800 MG: 400 TABLET, FILM COATED ORAL at 10:29

## 2025-03-08 ASSESSMENT — PAIN - FUNCTIONAL ASSESSMENT
PAIN_FUNCTIONAL_ASSESSMENT: ACTIVITIES ARE NOT PREVENTED
PAIN_FUNCTIONAL_ASSESSMENT: ACTIVITIES ARE NOT PREVENTED

## 2025-03-08 ASSESSMENT — PAIN DESCRIPTION - DESCRIPTORS
DESCRIPTORS: ACHING;CRAMPING
DESCRIPTORS: CRAMPING

## 2025-03-08 ASSESSMENT — PAIN SCALES - GENERAL
PAINLEVEL_OUTOF10: 4
PAINLEVEL_OUTOF10: 3
PAINLEVEL_OUTOF10: 8
PAINLEVEL_OUTOF10: 8
PAINLEVEL_OUTOF10: 3
PAINLEVEL_OUTOF10: 3

## 2025-03-08 ASSESSMENT — PAIN DESCRIPTION - LOCATION
LOCATION: ABDOMEN
LOCATION: INCISION
LOCATION: ABDOMEN

## 2025-03-08 ASSESSMENT — PAIN DESCRIPTION - ORIENTATION
ORIENTATION: LOWER
ORIENTATION: LOWER

## 2025-03-08 NOTE — CARE COORDINATION
Care Management Initial Assessment       RUR:12%  Readmission? No  1st IM letter given? N/A  1st  letter given: N/A    CM met with patient and her  in ICU at the bedside. Patient is fully ambulatory.  No discharge planning needs presented at this time.        03/08/25 1450   Service Assessment   Patient Orientation Alert and Oriented   Cognition Alert   History Provided By Patient   Primary Caregiver Spouse   Accompanied By/Relationship spouse   Support Systems Spouse/Significant Other;Parent   Patient's Healthcare Decision Maker is: Legal Next of Kin   PCP Verified by CM No   Prior Functional Level Independent in ADLs/IADLs   Current Functional Level Independent in ADLs/IADLs   Can patient return to prior living arrangement Yes   Ability to make needs known: Good   Family able to assist with home care needs: Yes   Would you like for me to discuss the discharge plan with any other family members/significant others, and if so, who? No   Financial Resources None   Community Resources None

## 2025-03-09 LAB
ANION GAP SERPL CALC-SCNC: 6 MMOL/L (ref 2–12)
BLD PROD TYP BPU: NORMAL
BLOOD BANK BLOOD PRODUCT EXPIRATION DATE: NORMAL
BLOOD BANK DISPENSE STATUS: NORMAL
BLOOD BANK ISBT PRODUCT BLOOD TYPE: 6200
BLOOD BANK PRODUCT CODE: NORMAL
BLOOD BANK UNIT TYPE AND RH: NORMAL
BPU ID: NORMAL
BUN SERPL-MCNC: 8 MG/DL (ref 6–20)
BUN/CREAT SERPL: 11 (ref 12–20)
CALCIUM SERPL-MCNC: 8.1 MG/DL (ref 8.5–10.1)
CHLORIDE SERPL-SCNC: 96 MMOL/L (ref 97–108)
CO2 SERPL-SCNC: 26 MMOL/L (ref 21–32)
CREAT SERPL-MCNC: 0.7 MG/DL (ref 0.55–1.02)
ERYTHROCYTE [DISTWIDTH] IN BLOOD BY AUTOMATED COUNT: 16 % (ref 11.5–14.5)
GLUCOSE SERPL-MCNC: 97 MG/DL (ref 65–100)
HCT VFR BLD AUTO: 21.2 % (ref 35–47)
HGB BLD-MCNC: 7.1 G/DL (ref 11.5–16)
HISTORY CHECK: NORMAL
MAGNESIUM SERPL-MCNC: 1.6 MG/DL (ref 1.6–2.4)
MCH RBC QN AUTO: 28.2 PG (ref 26–34)
MCHC RBC AUTO-ENTMCNC: 33.5 G/DL (ref 30–36.5)
MCV RBC AUTO: 84.1 FL (ref 80–99)
NRBC # BLD: 0 K/UL (ref 0–0.01)
NRBC BLD-RTO: 0 PER 100 WBC
PHOSPHATE SERPL-MCNC: 3.9 MG/DL (ref 2.6–4.7)
PLATELET # BLD AUTO: 146 K/UL (ref 150–400)
PMV BLD AUTO: 10.1 FL (ref 8.9–12.9)
POTASSIUM SERPL-SCNC: 3.4 MMOL/L (ref 3.5–5.1)
RBC # BLD AUTO: 2.52 M/UL (ref 3.8–5.2)
SODIUM SERPL-SCNC: 128 MMOL/L (ref 136–145)
UNIT DIVISION: 0
UNIT ISSUE DATE/TIME: NORMAL
WBC # BLD AUTO: 12.9 K/UL (ref 3.6–11)

## 2025-03-09 PROCEDURE — 6370000000 HC RX 637 (ALT 250 FOR IP): Performed by: ADVANCED PRACTICE MIDWIFE

## 2025-03-09 PROCEDURE — 1120000000 HC RM PRIVATE OB

## 2025-03-09 PROCEDURE — 6370000000 HC RX 637 (ALT 250 FOR IP): Performed by: OBSTETRICS & GYNECOLOGY

## 2025-03-09 PROCEDURE — P9016 RBC LEUKOCYTES REDUCED: HCPCS

## 2025-03-09 PROCEDURE — 6370000000 HC RX 637 (ALT 250 FOR IP): Performed by: INTERNAL MEDICINE

## 2025-03-09 PROCEDURE — 86900 BLOOD TYPING SEROLOGIC ABO: CPT

## 2025-03-09 PROCEDURE — 86901 BLOOD TYPING SEROLOGIC RH(D): CPT

## 2025-03-09 PROCEDURE — 83735 ASSAY OF MAGNESIUM: CPT

## 2025-03-09 PROCEDURE — 85027 COMPLETE CBC AUTOMATED: CPT

## 2025-03-09 PROCEDURE — 84100 ASSAY OF PHOSPHORUS: CPT

## 2025-03-09 PROCEDURE — 86923 COMPATIBILITY TEST ELECTRIC: CPT

## 2025-03-09 PROCEDURE — 86850 RBC ANTIBODY SCREEN: CPT

## 2025-03-09 PROCEDURE — 36430 TRANSFUSION BLD/BLD COMPNT: CPT

## 2025-03-09 PROCEDURE — 80048 BASIC METABOLIC PNL TOTAL CA: CPT

## 2025-03-09 PROCEDURE — APPNB30 APP NON BILLABLE TIME 0-30 MINS: Performed by: ADVANCED PRACTICE MIDWIFE

## 2025-03-09 RX ORDER — POTASSIUM CHLORIDE 750 MG/1
40 TABLET, EXTENDED RELEASE ORAL ONCE
Status: COMPLETED | OUTPATIENT
Start: 2025-03-09 | End: 2025-03-09

## 2025-03-09 RX ORDER — SODIUM CHLORIDE 9 MG/ML
INJECTION, SOLUTION INTRAVENOUS PRN
Status: DISCONTINUED | OUTPATIENT
Start: 2025-03-09 | End: 2025-03-10 | Stop reason: SDUPTHER

## 2025-03-09 RX ORDER — SIMETHICONE 80 MG
80 TABLET,CHEWABLE ORAL EVERY 6 HOURS PRN
Status: DISCONTINUED | OUTPATIENT
Start: 2025-03-09 | End: 2025-03-12 | Stop reason: HOSPADM

## 2025-03-09 RX ADMIN — DOCUSATE SODIUM 100 MG: 100 CAPSULE, LIQUID FILLED ORAL at 20:39

## 2025-03-09 RX ADMIN — POTASSIUM CHLORIDE 40 MEQ: 750 TABLET, EXTENDED RELEASE ORAL at 18:48

## 2025-03-09 RX ADMIN — IBUPROFEN 800 MG: 400 TABLET, FILM COATED ORAL at 14:34

## 2025-03-09 RX ADMIN — OXYCODONE 5 MG: 5 TABLET ORAL at 18:29

## 2025-03-09 RX ADMIN — ACETAMINOPHEN 1000 MG: 500 TABLET ORAL at 17:01

## 2025-03-09 RX ADMIN — OXYCODONE 10 MG: 5 TABLET ORAL at 14:34

## 2025-03-09 RX ADMIN — ACETAMINOPHEN 1000 MG: 500 TABLET ORAL at 00:34

## 2025-03-09 RX ADMIN — SIMETHICONE 80 MG: 80 TABLET, CHEWABLE ORAL at 20:39

## 2025-03-09 RX ADMIN — SIMETHICONE 80 MG: 80 TABLET, CHEWABLE ORAL at 08:51

## 2025-03-09 RX ADMIN — ACETAMINOPHEN 1000 MG: 500 TABLET ORAL at 08:47

## 2025-03-09 RX ADMIN — DOCUSATE SODIUM 100 MG: 100 CAPSULE, LIQUID FILLED ORAL at 08:47

## 2025-03-09 RX ADMIN — IBUPROFEN 800 MG: 400 TABLET, FILM COATED ORAL at 05:19

## 2025-03-09 RX ADMIN — SIMETHICONE 80 MG: 80 TABLET, CHEWABLE ORAL at 14:58

## 2025-03-09 RX ADMIN — IBUPROFEN 800 MG: 400 TABLET, FILM COATED ORAL at 22:49

## 2025-03-09 ASSESSMENT — PAIN SCALES - GENERAL
PAINLEVEL_OUTOF10: 2
PAINLEVEL_OUTOF10: 1
PAINLEVEL_OUTOF10: 0

## 2025-03-09 ASSESSMENT — PAIN - FUNCTIONAL ASSESSMENT: PAIN_FUNCTIONAL_ASSESSMENT: ACTIVITIES ARE NOT PREVENTED

## 2025-03-09 ASSESSMENT — PAIN DESCRIPTION - LOCATION: LOCATION: ABDOMEN;INCISION

## 2025-03-09 ASSESSMENT — PAIN DESCRIPTION - ORIENTATION: ORIENTATION: LOWER

## 2025-03-09 NOTE — LACTATION NOTE
This note was copied from a baby's chart.  Mother is weak today and unable to nurse or pump.  Baby is receiving donor milk via bottle.  I assisted mother to rest room, after using toilet, she became dizzy and unable to walk back to bed, emergency call bell pulled for assistance, nurses arrived with wheelchair and assisted mother back to bed.

## 2025-03-10 LAB
ABO + RH BLD: NORMAL
ANION GAP SERPL CALC-SCNC: 4 MMOL/L (ref 2–12)
BASOPHILS # BLD: 0.02 K/UL (ref 0–0.1)
BASOPHILS NFR BLD: 0.2 % (ref 0–1)
BLD PROD TYP BPU: NORMAL
BLOOD BANK BLOOD PRODUCT EXPIRATION DATE: NORMAL
BLOOD BANK DISPENSE STATUS: NORMAL
BLOOD BANK ISBT PRODUCT BLOOD TYPE: 5100
BLOOD BANK PRODUCT CODE: NORMAL
BLOOD BANK UNIT TYPE AND RH: NORMAL
BLOOD GROUP ANTIBODIES SERPL: NORMAL
BPU ID: NORMAL
BUN SERPL-MCNC: 7 MG/DL (ref 6–20)
BUN/CREAT SERPL: 10 (ref 12–20)
CALCIUM SERPL-MCNC: 9 MG/DL (ref 8.5–10.1)
CHLORIDE SERPL-SCNC: 102 MMOL/L (ref 97–108)
CO2 SERPL-SCNC: 28 MMOL/L (ref 21–32)
CREAT SERPL-MCNC: 0.7 MG/DL (ref 0.55–1.02)
CROSSMATCH RESULT: NORMAL
DIFFERENTIAL METHOD BLD: ABNORMAL
EOSINOPHIL # BLD: 0.15 K/UL (ref 0–0.4)
EOSINOPHIL NFR BLD: 1.6 % (ref 0–7)
ERYTHROCYTE [DISTWIDTH] IN BLOOD BY AUTOMATED COUNT: 15.9 % (ref 11.5–14.5)
GLUCOSE SERPL-MCNC: 76 MG/DL (ref 65–100)
HCT VFR BLD AUTO: 24.5 % (ref 35–47)
HGB BLD-MCNC: 8.4 G/DL (ref 11.5–16)
IMM GRANULOCYTES # BLD AUTO: 0.08 K/UL (ref 0–0.04)
IMM GRANULOCYTES NFR BLD AUTO: 0.8 % (ref 0–0.5)
LYMPHOCYTES # BLD: 1.71 K/UL (ref 0.8–3.5)
LYMPHOCYTES NFR BLD: 18 % (ref 12–49)
MAGNESIUM SERPL-MCNC: 1.8 MG/DL (ref 1.6–2.4)
MCH RBC QN AUTO: 29.3 PG (ref 26–34)
MCHC RBC AUTO-ENTMCNC: 34.3 G/DL (ref 30–36.5)
MCV RBC AUTO: 85.4 FL (ref 80–99)
MONOCYTES # BLD: 0.64 K/UL (ref 0–1)
MONOCYTES NFR BLD: 6.7 % (ref 5–13)
NEUTS SEG # BLD: 6.91 K/UL (ref 1.8–8)
NEUTS SEG NFR BLD: 72.7 % (ref 32–75)
NRBC # BLD: 0.03 K/UL (ref 0–0.01)
NRBC BLD-RTO: 0.3 PER 100 WBC
PHOSPHATE SERPL-MCNC: 4.8 MG/DL (ref 2.6–4.7)
PLATELET # BLD AUTO: 188 K/UL (ref 150–400)
PMV BLD AUTO: 9.7 FL (ref 8.9–12.9)
POTASSIUM SERPL-SCNC: 4.5 MMOL/L (ref 3.5–5.1)
RBC # BLD AUTO: 2.87 M/UL (ref 3.8–5.2)
SODIUM SERPL-SCNC: 134 MMOL/L (ref 136–145)
SPECIMEN EXP DATE BLD: NORMAL
UNIT DIVISION: 0
UNIT ISSUE DATE/TIME: NORMAL
WBC # BLD AUTO: 9.5 K/UL (ref 3.6–11)

## 2025-03-10 PROCEDURE — 85025 COMPLETE CBC W/AUTO DIFF WBC: CPT

## 2025-03-10 PROCEDURE — 83735 ASSAY OF MAGNESIUM: CPT

## 2025-03-10 PROCEDURE — 6370000000 HC RX 637 (ALT 250 FOR IP): Performed by: OBSTETRICS & GYNECOLOGY

## 2025-03-10 PROCEDURE — 36415 COLL VENOUS BLD VENIPUNCTURE: CPT

## 2025-03-10 PROCEDURE — 1120000000 HC RM PRIVATE OB

## 2025-03-10 PROCEDURE — 6370000000 HC RX 637 (ALT 250 FOR IP): Performed by: INTERNAL MEDICINE

## 2025-03-10 PROCEDURE — 84100 ASSAY OF PHOSPHORUS: CPT

## 2025-03-10 PROCEDURE — 80048 BASIC METABOLIC PNL TOTAL CA: CPT

## 2025-03-10 RX ORDER — NIFEDIPINE 30 MG/1
30 TABLET, EXTENDED RELEASE ORAL DAILY
Status: DISCONTINUED | OUTPATIENT
Start: 2025-03-10 | End: 2025-03-12 | Stop reason: HOSPADM

## 2025-03-10 RX ADMIN — OXYCODONE 10 MG: 5 TABLET ORAL at 09:11

## 2025-03-10 RX ADMIN — ACETAMINOPHEN 1000 MG: 500 TABLET ORAL at 21:06

## 2025-03-10 RX ADMIN — IBUPROFEN 800 MG: 400 TABLET, FILM COATED ORAL at 15:56

## 2025-03-10 RX ADMIN — SIMETHICONE 80 MG: 80 TABLET, CHEWABLE ORAL at 09:11

## 2025-03-10 RX ADMIN — IBUPROFEN 800 MG: 400 TABLET, FILM COATED ORAL at 06:29

## 2025-03-10 RX ADMIN — OXYCODONE 10 MG: 5 TABLET ORAL at 12:46

## 2025-03-10 RX ADMIN — OXYCODONE 5 MG: 5 TABLET ORAL at 04:39

## 2025-03-10 RX ADMIN — OXYCODONE 10 MG: 5 TABLET ORAL at 21:06

## 2025-03-10 RX ADMIN — ACETAMINOPHEN 1000 MG: 500 TABLET ORAL at 04:39

## 2025-03-10 RX ADMIN — OXYCODONE 10 MG: 5 TABLET ORAL at 16:32

## 2025-03-10 RX ADMIN — OXYCODONE 5 MG: 5 TABLET ORAL at 00:05

## 2025-03-10 RX ADMIN — DOCUSATE SODIUM 100 MG: 100 CAPSULE, LIQUID FILLED ORAL at 09:11

## 2025-03-10 RX ADMIN — DOCUSATE SODIUM 100 MG: 100 CAPSULE, LIQUID FILLED ORAL at 21:06

## 2025-03-10 RX ADMIN — POLYETHYLENE GLYCOL 3350 17 G: 17 POWDER, FOR SOLUTION ORAL at 09:12

## 2025-03-10 RX ADMIN — SIMETHICONE 80 MG: 80 TABLET, CHEWABLE ORAL at 04:39

## 2025-03-10 RX ADMIN — NIFEDIPINE 30 MG: 30 TABLET, FILM COATED, EXTENDED RELEASE ORAL at 16:32

## 2025-03-10 RX ADMIN — ACETAMINOPHEN 1000 MG: 500 TABLET ORAL at 12:46

## 2025-03-10 ASSESSMENT — PAIN DESCRIPTION - ORIENTATION
ORIENTATION: LOWER
ORIENTATION: LOWER;MID
ORIENTATION: LOWER;MID
ORIENTATION: LOWER
ORIENTATION: LOWER
ORIENTATION: LOWER;MID

## 2025-03-10 ASSESSMENT — PAIN DESCRIPTION - LOCATION
LOCATION: ABDOMEN;INCISION

## 2025-03-10 ASSESSMENT — PAIN DESCRIPTION - DESCRIPTORS
DESCRIPTORS: SORE
DESCRIPTORS: DISCOMFORT;PRESSURE;CRAMPING
DESCRIPTORS: CRAMPING;ACHING;PRESSURE
DESCRIPTORS: SORE
DESCRIPTORS: CRAMPING;ACHING;PRESSURE
DESCRIPTORS: SORE
DESCRIPTORS: CRAMPING;SORE
DESCRIPTORS: SORE

## 2025-03-10 ASSESSMENT — PAIN SCALES - GENERAL
PAINLEVEL_OUTOF10: 3
PAINLEVEL_OUTOF10: 3
PAINLEVEL_OUTOF10: 5
PAINLEVEL_OUTOF10: 2
PAINLEVEL_OUTOF10: 3
PAINLEVEL_OUTOF10: 2
PAINLEVEL_OUTOF10: 4
PAINLEVEL_OUTOF10: 2

## 2025-03-10 ASSESSMENT — PAIN - FUNCTIONAL ASSESSMENT
PAIN_FUNCTIONAL_ASSESSMENT: ACTIVITIES ARE NOT PREVENTED

## 2025-03-10 NOTE — LACTATION NOTE
This note was copied from a baby's chart.  Mom states her plan is to exclusively breat feed. I talked to her about the possibility of her milk taking longer to come in and potential for less supply due to her blood loss. Baby is able to latch and nurse well. Baby getting donor milk. Mom not feeling well yesterday and was not able to put baby to the breast.     I helped mom with a feeding this morning. Baby very fussy(hungry). We used some donor milk at the breast to get baby to latch. Parents are familiar with syringe feeding and dad was able to take over syringing at the breast when baby started to get fussy. They will continue to supplement with the donor milk in the hospital but will switch to formula if they go home today. Mom has a pump at the bedside and she will begin to pump more consistently for extra breast stimulation.

## 2025-03-10 NOTE — DISCHARGE INSTRUCTIONS
Postpartum Support Groups  We know that all of us are dealing with a tremendous amount of uncertainty, confusion and disruption to our daily lives, which may result in increased anxiety, depression and fear. If you are feeling unsettled or worse, please know that we are here to help. During this time of increased caution and care for one another, [facility] [place] ([facility]) is offering virtual support groups to ALL MOTHERS in [place] regardless of the age of your child/children as a way to help weather this emotional storm together. Social support is an important part of self-care during this time of physical distancing.  Virtual postpartum support group meetings available at www.[facility].org  Warm Line: 459.236.7770    Breastfeeding Support Groups    and  of each month at [place] from  and  of each month at [place] from 10-11a      [email or web address]          Section: What to Expect at Home  Your Recovery     A  section, or , is surgery to deliver your baby through a cut that the doctor makes in your lower belly and uterus. The cut is called an incision.  You may have some pain in your lower belly and need pain medicine for 1 to 2 weeks. You can expect some vaginal bleeding for several weeks. You will probably need about 6 weeks to fully recover.  It's important to take it easy while the incision heals. Avoid heavy lifting, strenuous activities, and exercises that strain the belly muscles while you recover. Ask a family member or friend for help with housework, cooking, and shopping.  This care sheet gives you a general idea about how long it will take for you to recover. But each person recovers at a different pace. Follow the steps below to get better as quickly as possible.  How can you care for yourself at home?  Activity    Rest when you feel tired. Getting enough sleep will help you

## 2025-03-11 LAB
ALBUMIN SERPL-MCNC: 2.3 G/DL (ref 3.5–5)
ALBUMIN/GLOB SERPL: 0.6 (ref 1.1–2.2)
ALP SERPL-CCNC: 111 U/L (ref 45–117)
ALT SERPL-CCNC: 88 U/L (ref 12–78)
ANION GAP SERPL CALC-SCNC: 6 MMOL/L (ref 2–12)
AST SERPL-CCNC: 57 U/L (ref 15–37)
BILIRUB SERPL-MCNC: 0.5 MG/DL (ref 0.2–1)
BUN SERPL-MCNC: 6 MG/DL (ref 6–20)
BUN/CREAT SERPL: 8 (ref 12–20)
CALCIUM SERPL-MCNC: 9.5 MG/DL (ref 8.5–10.1)
CHLORIDE SERPL-SCNC: 104 MMOL/L (ref 97–108)
CO2 SERPL-SCNC: 28 MMOL/L (ref 21–32)
CREAT SERPL-MCNC: 0.78 MG/DL (ref 0.55–1.02)
ERYTHROCYTE [DISTWIDTH] IN BLOOD BY AUTOMATED COUNT: 15.9 % (ref 11.5–14.5)
GLOBULIN SER CALC-MCNC: 4.1 G/DL (ref 2–4)
GLUCOSE SERPL-MCNC: 105 MG/DL (ref 65–100)
HCT VFR BLD AUTO: 27.8 % (ref 35–47)
HGB BLD-MCNC: 9.4 G/DL (ref 11.5–16)
MAGNESIUM SERPL-MCNC: 1.5 MG/DL (ref 1.6–2.4)
MCH RBC QN AUTO: 29.2 PG (ref 26–34)
MCHC RBC AUTO-ENTMCNC: 33.8 G/DL (ref 30–36.5)
MCV RBC AUTO: 86.3 FL (ref 80–99)
NRBC # BLD: 0.05 K/UL (ref 0–0.01)
NRBC BLD-RTO: 0.6 PER 100 WBC
PHOSPHATE SERPL-MCNC: 4.7 MG/DL (ref 2.6–4.7)
PLATELET # BLD AUTO: 280 K/UL (ref 150–400)
PMV BLD AUTO: 9.8 FL (ref 8.9–12.9)
POTASSIUM SERPL-SCNC: 3.6 MMOL/L (ref 3.5–5.1)
PROT SERPL-MCNC: 6.4 G/DL (ref 6.4–8.2)
RBC # BLD AUTO: 3.22 M/UL (ref 3.8–5.2)
SODIUM SERPL-SCNC: 138 MMOL/L (ref 136–145)
WBC # BLD AUTO: 8 K/UL (ref 3.6–11)

## 2025-03-11 PROCEDURE — 6370000000 HC RX 637 (ALT 250 FOR IP): Performed by: OBSTETRICS & GYNECOLOGY

## 2025-03-11 PROCEDURE — 6370000000 HC RX 637 (ALT 250 FOR IP): Performed by: INTERNAL MEDICINE

## 2025-03-11 PROCEDURE — 84100 ASSAY OF PHOSPHORUS: CPT

## 2025-03-11 PROCEDURE — 80053 COMPREHEN METABOLIC PANEL: CPT

## 2025-03-11 PROCEDURE — 36415 COLL VENOUS BLD VENIPUNCTURE: CPT

## 2025-03-11 PROCEDURE — 1120000000 HC RM PRIVATE OB

## 2025-03-11 PROCEDURE — 85027 COMPLETE CBC AUTOMATED: CPT

## 2025-03-11 PROCEDURE — 83735 ASSAY OF MAGNESIUM: CPT

## 2025-03-11 PROCEDURE — 99024 POSTOP FOLLOW-UP VISIT: CPT | Performed by: OBSTETRICS & GYNECOLOGY

## 2025-03-11 RX ADMIN — OXYCODONE 10 MG: 5 TABLET ORAL at 05:24

## 2025-03-11 RX ADMIN — SIMETHICONE 80 MG: 80 TABLET, CHEWABLE ORAL at 19:07

## 2025-03-11 RX ADMIN — OXYCODONE 10 MG: 5 TABLET ORAL at 19:07

## 2025-03-11 RX ADMIN — ACETAMINOPHEN 1000 MG: 500 TABLET ORAL at 05:24

## 2025-03-11 RX ADMIN — IBUPROFEN 800 MG: 400 TABLET, FILM COATED ORAL at 18:30

## 2025-03-11 RX ADMIN — NIFEDIPINE 30 MG: 30 TABLET, FILM COATED, EXTENDED RELEASE ORAL at 10:06

## 2025-03-11 RX ADMIN — OXYCODONE 10 MG: 5 TABLET ORAL at 23:27

## 2025-03-11 RX ADMIN — POLYETHYLENE GLYCOL 3350 17 G: 17 POWDER, FOR SOLUTION ORAL at 10:07

## 2025-03-11 RX ADMIN — OXYCODONE 5 MG: 5 TABLET ORAL at 15:17

## 2025-03-11 RX ADMIN — DOCUSATE SODIUM 100 MG: 100 CAPSULE, LIQUID FILLED ORAL at 20:29

## 2025-03-11 RX ADMIN — IBUPROFEN 800 MG: 400 TABLET, FILM COATED ORAL at 10:06

## 2025-03-11 RX ADMIN — IBUPROFEN 800 MG: 400 TABLET, FILM COATED ORAL at 00:56

## 2025-03-11 RX ADMIN — SIMETHICONE 80 MG: 80 TABLET, CHEWABLE ORAL at 10:06

## 2025-03-11 ASSESSMENT — PAIN DESCRIPTION - ORIENTATION
ORIENTATION: LOWER
ORIENTATION: LOWER

## 2025-03-11 ASSESSMENT — PAIN DESCRIPTION - LOCATION
LOCATION: INCISION;ABDOMEN
LOCATION: INCISION

## 2025-03-11 ASSESSMENT — PAIN DESCRIPTION - DESCRIPTORS
DESCRIPTORS: SORE
DESCRIPTORS: CRAMPING

## 2025-03-11 ASSESSMENT — PAIN SCALES - GENERAL
PAINLEVEL_OUTOF10: 4
PAINLEVEL_OUTOF10: 4

## 2025-03-11 ASSESSMENT — PAIN - FUNCTIONAL ASSESSMENT: PAIN_FUNCTIONAL_ASSESSMENT: ACTIVITIES ARE NOT PREVENTED

## 2025-03-11 NOTE — LACTATION NOTE
This note was copied from a baby's chart.  Mother states that she is feeling better today. Mother had a large blood loss. Discussed the importance of calorie intake, fluid intake, and proper stimulation of the breasts for milk supply. Plan of care is for mother to nurse when she is feeling good, mother will give donor milk when she needs a break and use the hospital garde pump for 15 minutes to stimulate the breasts. All expressed milk will be given to baby. Assisted mother latching baby to the right breast. Audible swallows noted and rhythmic suck observed. Mother will continue to feed baby on demand.

## 2025-03-11 NOTE — LACTATION NOTE
This note was copied from a baby's chart.  Infant nursing at the time of my visit; deep latch noted with rhythmic sucking and swallows noted. Mom expresses confidence in breastfeeding and is either feeding infant at breast or pumping, depending on how she is feeling as she recovers. She is also providing donor milk feeds as needed. She has a spectra pump for at home use. Opportunity for questions provided.     Breasts may become engorged when milk \"comes in\".  How milk is made / normal phases of milk production, supply and demand discussed.  Taught care of engorged breasts - frequent breastfeeding encouraged and breast massage ac. Then nurse the baby (or pump minimally for comfort). Apply cold compresses ac and/or pc x 15 minutes a few times a day for swelling or discomfort if necessary.  May need to do this care for a couple of days.Discussed prevention and treatment of mastitis.

## 2025-03-12 VITALS
HEART RATE: 74 BPM | BODY MASS INDEX: 37.1 KG/M2 | SYSTOLIC BLOOD PRESSURE: 152 MMHG | DIASTOLIC BLOOD PRESSURE: 86 MMHG | TEMPERATURE: 98.2 F | OXYGEN SATURATION: 97 % | WEIGHT: 202.82 LBS | RESPIRATION RATE: 16 BRPM

## 2025-03-12 PROBLEM — Z98.891 S/P EMERGENCY C-SECTION: Status: ACTIVE | Noted: 2025-03-12

## 2025-03-12 PROBLEM — O34.219 PATIENT DESIRES VAGINAL BIRTH AFTER CESAREAN SECTION (VBAC): Status: RESOLVED | Noted: 2025-03-05 | Resolved: 2025-03-12

## 2025-03-12 LAB
ALBUMIN SERPL-MCNC: 2.3 G/DL (ref 3.5–5)
ALBUMIN/GLOB SERPL: 0.6 (ref 1.1–2.2)
ALP SERPL-CCNC: 104 U/L (ref 45–117)
ALT SERPL-CCNC: 63 U/L (ref 12–78)
ANION GAP SERPL CALC-SCNC: 9 MMOL/L (ref 2–12)
AST SERPL-CCNC: 31 U/L (ref 15–37)
BILIRUB SERPL-MCNC: 0.5 MG/DL (ref 0.2–1)
BUN SERPL-MCNC: 6 MG/DL (ref 6–20)
BUN/CREAT SERPL: 10 (ref 12–20)
CALCIUM SERPL-MCNC: 9.8 MG/DL (ref 8.5–10.1)
CHLORIDE SERPL-SCNC: 100 MMOL/L (ref 97–108)
CO2 SERPL-SCNC: 26 MMOL/L (ref 21–32)
CREAT SERPL-MCNC: 0.62 MG/DL (ref 0.55–1.02)
ERYTHROCYTE [DISTWIDTH] IN BLOOD BY AUTOMATED COUNT: 15.9 % (ref 11.5–14.5)
GLOBULIN SER CALC-MCNC: 4 G/DL (ref 2–4)
GLUCOSE SERPL-MCNC: 94 MG/DL (ref 65–100)
HCT VFR BLD AUTO: 29.2 % (ref 35–47)
HGB BLD-MCNC: 9.8 G/DL (ref 11.5–16)
MAGNESIUM SERPL-MCNC: 1.6 MG/DL (ref 1.6–2.4)
MCH RBC QN AUTO: 28.7 PG (ref 26–34)
MCHC RBC AUTO-ENTMCNC: 33.6 G/DL (ref 30–36.5)
MCV RBC AUTO: 85.4 FL (ref 80–99)
NRBC # BLD: 0.08 K/UL (ref 0–0.01)
NRBC BLD-RTO: 0.9 PER 100 WBC
PHOSPHATE SERPL-MCNC: 4.1 MG/DL (ref 2.6–4.7)
PLATELET # BLD AUTO: 280 K/UL (ref 150–400)
PMV BLD AUTO: 9.1 FL (ref 8.9–12.9)
POTASSIUM SERPL-SCNC: 3.7 MMOL/L (ref 3.5–5.1)
PROT SERPL-MCNC: 6.3 G/DL (ref 6.4–8.2)
RBC # BLD AUTO: 3.42 M/UL (ref 3.8–5.2)
SODIUM SERPL-SCNC: 135 MMOL/L (ref 136–145)
WBC # BLD AUTO: 9.3 K/UL (ref 3.6–11)

## 2025-03-12 PROCEDURE — 6370000000 HC RX 637 (ALT 250 FOR IP): Performed by: INTERNAL MEDICINE

## 2025-03-12 PROCEDURE — 6370000000 HC RX 637 (ALT 250 FOR IP): Performed by: OBSTETRICS & GYNECOLOGY

## 2025-03-12 PROCEDURE — 85027 COMPLETE CBC AUTOMATED: CPT

## 2025-03-12 PROCEDURE — 99024 POSTOP FOLLOW-UP VISIT: CPT | Performed by: OBSTETRICS & GYNECOLOGY

## 2025-03-12 PROCEDURE — 84100 ASSAY OF PHOSPHORUS: CPT

## 2025-03-12 PROCEDURE — 83735 ASSAY OF MAGNESIUM: CPT

## 2025-03-12 PROCEDURE — 80053 COMPREHEN METABOLIC PANEL: CPT

## 2025-03-12 RX ORDER — OXYCODONE HYDROCHLORIDE 5 MG/1
5 TABLET ORAL EVERY 6 HOURS PRN
Qty: 20 TABLET | Refills: 0 | Status: SHIPPED | OUTPATIENT
Start: 2025-03-12 | End: 2025-03-17

## 2025-03-12 RX ORDER — IBUPROFEN 800 MG/1
800 TABLET, FILM COATED ORAL EVERY 8 HOURS
Qty: 120 TABLET | Refills: 3 | Status: SHIPPED | OUTPATIENT
Start: 2025-03-12

## 2025-03-12 RX ORDER — PSEUDOEPHEDRINE HCL 30 MG
100 TABLET ORAL 2 TIMES DAILY
Qty: 60 CAPSULE | Refills: 0 | Status: SHIPPED | OUTPATIENT
Start: 2025-03-12

## 2025-03-12 RX ORDER — NIFEDIPINE 30 MG/1
30 TABLET, EXTENDED RELEASE ORAL DAILY
Qty: 30 TABLET | Refills: 3 | Status: SHIPPED | OUTPATIENT
Start: 2025-03-12

## 2025-03-12 RX ADMIN — IBUPROFEN 800 MG: 400 TABLET, FILM COATED ORAL at 04:03

## 2025-03-12 RX ADMIN — OXYCODONE 5 MG: 5 TABLET ORAL at 04:04

## 2025-03-12 RX ADMIN — OXYCODONE 10 MG: 5 TABLET ORAL at 08:48

## 2025-03-12 RX ADMIN — NIFEDIPINE 30 MG: 30 TABLET, FILM COATED, EXTENDED RELEASE ORAL at 09:08

## 2025-03-12 RX ADMIN — POLYETHYLENE GLYCOL 3350 17 G: 17 POWDER, FOR SOLUTION ORAL at 09:08

## 2025-03-12 ASSESSMENT — PAIN SCALES - GENERAL
PAINLEVEL_OUTOF10: 4
PAINLEVEL_OUTOF10: 3

## 2025-03-12 ASSESSMENT — PAIN DESCRIPTION - LOCATION
LOCATION: ABDOMEN
LOCATION: ABDOMEN;INCISION

## 2025-03-12 ASSESSMENT — PAIN DESCRIPTION - DESCRIPTORS
DESCRIPTORS: CRAMPING;SORE
DESCRIPTORS: ACHING

## 2025-03-12 ASSESSMENT — PAIN DESCRIPTION - ORIENTATION
ORIENTATION: LOWER
ORIENTATION: MID

## 2025-03-12 ASSESSMENT — PAIN - FUNCTIONAL ASSESSMENT: PAIN_FUNCTIONAL_ASSESSMENT: ACTIVITIES ARE NOT PREVENTED

## 2025-03-12 NOTE — PROGRESS NOTES
[facility] ICU Progress Note        Tyrese King  1990  [id number]  3/8/2025    Summary:  34 yof admitted to the ICU for postpartum hemorrhage, now post bilateral uterine artery embo.  Hemodynamically stable.     Assessment and plan:  Acute post partum hemorrhage:    -post bilateral uterine artery embo   -hb stable post TMP  -check TEG     Acute blood loss anemia:  -hb 8.4 this am   -post massive transfusion     Acute thrombocytopenia:    -plt dropped to 45  -transfuse 1unit plts this am  -target Plt > 50k  -suspect this is consumptive coagulopathy at this point.   -discussed transfusion threshold with OBGYN this am     GIOVANI:  -congential uni-kidney   -Cr better today 0.8  -lasix x 1 IV yesterday with great response   -Na 130, Cl 99    Hypomag:   Mag 1.4  -replaced 4gm IV mag this am     Mobilize     HPI:  Hemodynamically stable. Improved. Pain control is better.       ICU DAILY CHECKLIST     Code Status:full   DVT Prophylaxis:lovenox can start once plts > 50K  T/L/D: PIVS  SUP: na   Diet: regular  Activity Level: mobilize daily   ABCDEF Bundle/Checklist Completed:Yes  Disposition: cont icu care  Multidisciplinary Rounds Completed: yes  Goals of Care Discussion/Palliative: yes  Patient/Family Updated: yes      OBJECTIVE  Vitals:    03/08/25 0531 03/08/25 0600 03/08/25 0800 03/08/25 0815   BP: (!) 119/53 109/61 122/66    Pulse: 75 71     Resp: 24 21  16   Temp: 98.2 °F (36.8 °C)  98 °F (36.7 °C)    TempSrc: Oral  Oral    SpO2: 99%      Weight:         EXAM:   GEN: awake alert and oriented   HEENT  -Head: NC/AT;  -Eyes: PERRL, EOMI. No discharge or redness;  -Ears: External ears are normal. Normal TMs.  -Nose: Normal nares.  -Mouth and throat: MMM. Normal gums, mucosa, palate,. Good dentition.  NECK: Supple, with no masses. No JVD   CV: RRR, no m/r/g.  LUNGS: CTAB, no w/r/c.  ABD: Soft, NT/ND, no masses, NTTP  SKIN: Warm, well perfused. No skin rashes or abnormal lesions.  EXT: No clubbing, cyanosis, or 
  Physician Progress Note      PATIENT:               AMILCAR MANDUJANO  CSN #:                  [id number]  :                       1990  ADMIT DATE:       3/5/2025 12:17 PM  DISCH DATE:  RESPONDING  PROVIDER #:        Mari Gold MD          QUERY TEXT:    Pt admitted with onset of labor. Pt noted to have Low BP and decreased Hgb   requiring IR intervention, transfusions, colloids, oxytocic, and uterotonic.   If possible, please document in the progress notes and discharge summary if   you are evaluating and/or treating any of the following:    The medical record reflects the following:  Risk Factors: 34 y.o. female wih history of Placenta Previa, Bicornuate   uterus, gestational DM, Left kidney only, Repeat  delivery    Clinical Indicators:  3/7 Intensivist Consultant  \"Post procedure she developed hypotension due to blood loss. Pt seen B   Critical care at the time and hypotension was responding to volume from blood   transfusion with BP increasing and HR decreasing.    Since pt was improving and plan was for IR to embolize uterine artery decision   made to leave in OB. Post procedure contacted by OB Hospitalist because BP   still soft and H/H was 4.1/12.6\"    3/7 Interventional Radiologist  \"Diagnosis: postpartum hemorrhage    Procedure(s): pelvic angiography shows active bleeding from a distal branch of   the left uterine artery. Bilateral uterine artery embolization with gelfoam   was performed, to stasis on the left, and to sluggish flow on the right.  R CFA closure with 5Fr Celt\"    3/6 Hgb  12.9  3/7 Hgb   8.0->4.1->6.1    Treatment: Transfer to ICU level of care, S/p Bilateral uterine artery   embolization with gelfoam, PRBC and FFP transfusions, Albumin human 5% IV   solution 25 g, Carboprost, Ephedrine, LR continuous IV at 125ml/hr,   Methergine, Cytotec, Cyklokapron, H/H monitoring, Is/Os    Thank you,  Freya Perez, GERTRUDEN, RN, CCRN, CRCR  Options provided:  -- Hemorrhagic Shock  -- Hypovolemic 
0510 - Bedside and Verbal shift change report given to EVER Townsend RN (oncoming nurse) by DENISSE Melendez RN (offgoing nurse). Report included the following information Nurse Handoff Report, Intake/Output, MAR, and Recent Results.    0554 - Pt repositioned to left lateral in exaggerated runner's with peanut ball.    0740 - Bedside and Verbal shift change report given to CAMILLE Parrish RN (oncoming nurse) by EVER Townsend RN (offgoing nurse). Report included the following information Nurse Handoff Report, Intake/Output, MAR, and Recent Results.           
0730: Bedside and Verbal shift change report given to NAVEEN Parrish RN (oncoming nurse) by Linda Townsend RN (offgoing nurse). Report included the following information ED SBAR, MAR, and Recent Results.      0745: Dr Pimentel at bedside. Forebag ruptured with thin meconium noted on pad.     0749: Patient turned to right side with peanut ball.     0945: Patient complaining of some pressure in her vagina that radiates up to her c/s scar. SVE 8-9/90/-2. Patient turned to left side in flying cowgirl with peanut ball and she hit her epidural bolus button.     1105: Side lying pelvic release on both sides.     1116: Knee chest with cub.     1135: Dr Pimentel notified about increased temperature. Antibiotic orders and PO tylenol ordered.     1148: Walchers x 10 minutes. Patient tolerated well.     1203: Exaggerated runners on right side with peanut ball in trendelenburg. Urine noted to be bloody at this time.    1259: SVE 9cm. IUPC placed by Dr Pimentel.     1312: Patient sitting up in frog position with feet together on peanut ball.     1420: Patient comfortable in the position she is in. Will continue a little longer.     1507: Turned to left side, still sitting up with right leg on peanut ball.    1553: Dr Pimentel at bedside. SVE unchanged. Discussing repeat  section. Patient would like to discuss with .     1614:Patient would like to try to labor a little longer. Dr Pimentel is okay with this at the time. Patient in knee chest, shaking apples.     1618- Difficulty monitoring baby due to patient moving all around in bed.    1707: Patient continues to be in knee chest. Position bed to trendelenburg. Patient continuously shaking her butt and trying to engage baby into an optimal position. Difficulty monitoring baby's heart rate.     1717: SVE by Dr Pimentel. Cervix unchanged. Discussing  section again at this time.     1728: Patient not agreeing with a  at this time. She would like more time to 
0745:Bedside and Verbal shift change report given to Arabella JOHNSON (oncoming nurse) by Tamiko JOHNSON (offgoing nurse). Report included the following information Nurse Handoff Report.     0938: Elvin Esposito CNM, updated on Hgb of 7.1. Pt taking nap at this time, prioritizing sleep at this time as pt is exhausted and tearful.    1429: Emergency hernandez rung by LC, pt noted to be responsive, but lightheaded and in significant pain. Pt assisted back to bed via wheelchair and given motrin and roxicodone for pain. Also, Vaibhav HODGE called by staff to come assess pt.    1451: Pt now in bed resting and eating light foods. Vaibhav HODGE to come see pt and discuss plan of care.    1510: AYESHA Esposito at bedside assessing pt.    1520: One unit of blood to be ordered for pt. Also,one time dose of K+ ordered to replete potassium.    1841: Blood transfusion started. Pt states feeling better than earlier this afternoon and pain is decreased. Also, pt's ability to ambulate has improved.        
0755- Bedside shift change report given to IZABELLA Walton RN (oncoming nurse) by GENET Pisano RN (offgoing nurse). Report included the following information Nurse Handoff Report.      1120- Called Elvin Esposito CNM to report the patient's last three elevated blood pressures. She ordered to do BP checks every four hours. She suggested that the patient go home with a blood pressure cuff whenever she is discharged and that she follow up with her OB/GYN in 1 week.     1600- Called Dr. Camarena to report the patient's elevated BPs at 159/82 in the left arm and 161/86 in the right arm. The patient denies any symptoms of headache, nausea, chest tightness, vision changes, or upper right abdominal quadrant pain. Dr. Camarena states that she will come to see the patient.   
0800: Bedside and Verbal shift change report given to GENET Alcazar RN   (oncoming nurse) by IZABELLA Gaines RN (offgoing nurse). Report included the following information Nurse Handoff Report and MAR.      1210: I have reviewed discharge instructions with the patient. The patient verbalized understanding.  
1000: Patient ambulating with minimal assistance from spouse and this RN. Patient tolerating well.     1030: Ibuprofen given for minimal cramping.     1115: Patients bernard taken out & patient able to void on her own. Eileen care performed by patient and pad changed.   
1215: Pt arrived to [facility] w/ complaints of LOF, contractions. Nitrazine test negative. Amnisure negative.     1235: Dr. Zheng at the bedside. SVE 1/70/-2. Decision to admit for labor.    1340: Dr. Gold at the bedside to discuss plan of care w/ pt. Pt questions answered and agrees to plan.     1749: . Pt requesting IV pain medication.     1800: Pt now requesting epidural. Fluid bolus started.    1824: Dr. Escobar at the bedside for epidural placement.     1836: Pt on left side.    1854: Dr. Gold at the bedside to assess pt progress. SVE 2/70/-2.     1905: MACO Villegas CNM at the bedside to check in w/ pt and discuss plan of care for the night.     1920: Turned pt to right side lying position w/ peanut ball.     2027: Turned pt to left side lying position w/ peanut ball. Pads & gown changed.    2050: Mckeon cath placed. 800cc clear yellow urine. SVE 4/80/-2, SARA Lester RN. Pt turned.    2054: Penicillin given, 1st dose, (see MAR).    2100: Verbal orders to start pitocin, per CNM (and pt requested).     2226: Pt turned to right side w/ peanut ball.     2241: Pt vomiting. Zofran given (see MAR).    2330: Bedside and Verbal shift change report given to DENISSE Melendez RN (oncoming nurse) by SARA Lester RN (offgoing nurse). Report included the following information Nurse Handoff Report, Adult Overview, Intake/Output, MAR, Recent Results, and Event Log.       
12:59 pm - cervix 8-9cm, IUPC placed, pit at 14, starting to have early appearing decels    16:00pm - cervix unchanged at 8-9cm, pit at 18, now with decreasing variability and ongoing early/late decelerations, inadequate contraction pattern.    Given lack of cervical change since nursing check at 9:45am and unable to achieve adequate contractions, chorioamnionitis, meconium and decreasing fetal HR variability, h/o CS x1, feel that this is consistent with APA. Strongly recommending  section at this time for both maternal and fetal indications.     However, patient declining at this time, requesting additional hour to try different position changes and then rechecking. Feels like she needs to be more active.     Also now with several mild range Bps throughout the day, will send CMP and PCR.     If do end up proceeding with CS will plan for 2g ancef, 500mg azithro, then will resume amp/gent/clinda x 24 hrs following delivery and plan fetal pillow.    Patient Vitals for the past 24 hrs:   BP Temp Temp src Pulse Resp SpO2   25 1554 (!) 145/81 -- -- 61 17 --   25 1508 125/73 98.4 °F (36.9 °C) Oral 61 17 98 %   25 1421 134/67 98.4 °F (36.9 °C) Oral 64 17 96 %   25 1318 (!) 114/56 99.4 °F (37.4 °C) Oral 66 17 96 %   25 1206 136/63 99 °F (37.2 °C) Oral 65 17 99 %   25 1103 (!) 146/70 100.4 °F (38 °C) -- 63 17 96 %   25 0948 (!) 118/59 98.4 °F (36.9 °C) Oral 63 17 98 %   25 0910 (!) 151/65 99.6 °F (37.6 °C) Oral 65 17 100 %   25 0754 (!) 132/54 99 °F (37.2 °C) Oral 67 17 100 %   25 0650 135/71 98.2 °F (36.8 °C) Oral 61 16 98 %   25 0552 122/62 99 °F (37.2 °C) Oral 64 -- 97 %   25 045 -- -- -- -- -- 95 %   25 0450 116/63 98.6 °F (37 °C) Oral 67 14 --   25 044 -- -- -- 66 -- 93 %   25 044 -- -- -- 62 -- 94 %   25 0436 -- -- -- 64 -- 96 %   25 043 -- -- -- 63 -- 95 %   25 0426 -- -- -- 62 -- 97 %   25 
1346: TRANSFER - IN REPORT:    [facility] for routine progression of patient care      Report consisted of patient's Situation, Background, Assessment and   Recommendations(SBAR).     Information from the following report(s) Nurse Handoff Report, Surgery Report, Intake/Output, MAR, and Recent Results was reviewed with the receiving nurse.    Opportunity for questions and clarification was provided.      Assessment completed upon patient's arrival to unit and care assumed.     
1545: Patient ringing out for RN. Patient describes having a gradual increase in pain in lower abdomen that feel like contractions. Patient is diaphoretic describes about 7/10 pain. This RN checked patient's pad, there is scant amounts of watery bloody discharge consistent with previous checks. Applied cool washcloth to patients forehead and attempted position changes with no improvement. Contacted Dr Gold regarding new onset of pain, PRN pain medications increased and scheduled tylenol ordered by provider. Dilaudid & tylenol administered at this time see MAR.     1630: Patient standing up with this RN and spouse for pain relief. Patient describes some dizziness, but overall tolerating well. Patient going from standing to sitting positions as tolerated.     1700: Patient assisted back to bed and turned onto left side. Patient expresses standing up and moving decreased pain to a 2/10.   
15:30    Reassessed patient at bedside. Patient laboring in knee chest. Rechecked cervix, and unfortunately still unchanged at 8-9cm . Swollen lip of cervix on patient’s right side and still with residual cervix posteriorly and thin 1cm of cervix on patient’s left.  Suspect suboptimal fetal presentation, abdomen appears OP, fetal scalp palpates OT with ear palpable behind pubic symphysis. Prior attempts to rotate fetus earlier in the day unsuccessful. Nursing has been performing spinning babies throughout the day. Also suspect larger baby.     Baby now with more reassuring fetal heart rate tracing, with improved variability since pitocin cut back, but inadequate contractions.    Discussed with patient my impression that this is an abnormal labor curve and again strongly recommended  section. Discussed at this point very low probability of vaginal birth and as time continues to pass, potential for increase in complications such as more difficult  delivery, increasing risks of infection, risks of maternal hemorrhage following delivery, increased stress to baby, risks of uterine rupture, etc.     At this point, gave patient and her  time to chat independently. Returned to her room a few minutes later, discussed with the patient the option of a second opinion, as could sense patient’s discomfort with recommendation for  section at this time. Patient requested second opinion, and I consulted Dr Pate with OB, who graciously agreed to assess this patient and assume care at this time.    Leni Pimentel MD    
1930: Bedside shift change report given to IZABELLA Gaines RN (oncoming nurse) by JENNIFER Quesada RN (offgoing nurse). Report included the following information Nurse Handoff Report.     
1945: Bedside shift change report given to ARNOLD Martinez RN (oncoming nurse) by TRAVON Graf RN (offgoing nurse). Report included the following information Nurse Handoff Report.    
2011 R flying cowgirl   2028 Pit restarted at 1.  2045 Dr. Francis at bedside talking to patient. SVE. Unchanged. Suggesting C/S again.  2050 Pt agreed to C/S. Preparing pt for OR. Skin prepped, IV ABX given. Tylenol and pepcid given. Consents verified. Dr. Og at bedside to dose pt.   2116 in OR.  2152 Closing procedure started. Pt had uterine atony. Medications to be administered.   2154 400 mcg buccal Cytotec.  2155 1g of TXA  2230 Back to LR 3.  2242 BP low. 10.mg Ephedrine administered by MACO Benavides. Recheck in 3 mins. Bolus started.   2245 Fundus is very boggy with multiple large clots and steady heavy trickle. Continuing to massage fundus, firm intermittently but gets boggy soon after. Clots and lochia continue to come out. Dr. Pate called. Continue bolusing LR. Bolus postpartum pitocin.  2248 Methergine  2250 Violette placed by Dr. Pate. Will continue to monitor fundus and output. Will get second IV and labs. RN continuously at bedside since out of OR due to pts hypovolemia.  JENNIFER Olson RN  and SARA Hanna RN at bedside helping. RN's attempting to obtain 2nd IV and labs.  Pt drowsy but will arouse and talk to RN.   2315 Dr. Og at bedside with ultrasound attempting to get IV/Labs. Discussion of doing Phenylephrine for pts BP or albumin. Will continue with ephedrine until second access is obtained.   2357 10mg of ephedrine Per Dr. Og  0002 5mg of ephedrine Per Dr. Og. Will try to get more access for blood transfusion. Will obtain labs. Dr. Og to put in order for Albumin.   0038 1unit of PRBC. Dr. Og still at bedside bolusing blood. Second unit of PRBC released. Will administer albumin while waiting for second unit.  0042 2nd unit of PRBC  0048 25g Albumin  0120 Phenylephrine administered by Dr. Og   0134 Hemabate administered   0135 Plasma   0140 Pt to IR for Uterine artery embolization.  BSR given to FIONA Chery RN. Report consisted of patient's Situation, Background, Assessment and 
2330: Bedside shift change report given to EVER Blankenship RN (oncoming nurse) by ELIZABETH Ellis (offgoing nurse). Report included the following information Nurse Handoff Report, Adult Overview, Intake/Output, MAR, and Recent Results.     0530: RN at bedside to collect ordered labs. Pt requested to draw from current IV. This RN unsuccessful after multiple attempts. Advised pt of other option of using butterfly needle to draw from AC. Reassured pt of skill in phlebotomy and offered to take a look and assess for vein. Advised pt would not attempt without strong confidence. Pt refused this RN to attempt to assess or draw labs. Pt requesting ICU RN to come down to draw labs since \"they were able to get them before\". This RN stated she would call and ask to see if anyone from ICU was available.     0545: This RN spoke with ICU RN. ICU RN stated they had quite a few admissions and were not able to come down at this time. This RN notified pt of ICU response. Advised pt again on option of butterfly needle stick and strong skill set in phlebotomy. Pt still refuses and would like Anesthesia with possible US to come attempt. This RN will notify Anesthesia and charge RN of pt request.     0735: Bedside shift change report given to EVER Graf RN (oncoming nurse) by EVER Blankenship RN (offgoing nurse). Report included the following information Nurse Handoff Report, Adult Overview, Intake/Output, MAR, and Recent Results.     
3/6/25  2330: Bedside and Verbal shift change report given to Nesha Melendez RN (oncoming nurse) by Greta KATZ RN (offgoing nurse). Report included the following information Nurse Handoff Report, Adult Overview, Intake/Output, MAR, Recent Results, and Event Log.     Pt resting comfortably, POC discussed and pt in agreement.     0051: Pitocin started. PCN given.  Pt repositioned to L lateral runners position with peanut ball.    Temp 100.4, Jessica CNM aware. Will give tylenol, no additional orders received.     0350: PCN given.  Pt repositioned to R lateral runners position with peanut ball.     0510: Bedside and Verbal shift change report given to Linda Townsend RN (oncoming nurse) by Nesha Melendez RN (offgoing nurse). Report included the following information Nurse Handoff Report, Adult Overview, Intake/Output, MAR, Recent Results, and Event Log.         
Antepartum Progress Note    S:  Pt doing well this morning. Comfortable with epidural. Regular ctx, some bloody show, reassuring fetal surveillance.    O:  Patient Vitals for the past 24 hrs:   BP Temp Temp src Pulse Resp SpO2   03/06/25 0650 135/71 98.2 °F (36.8 °C) Oral 61 16 98 %   03/06/25 0552 122/62 99 °F (37.2 °C) Oral 64 -- 97 %   03/06/25 0451 -- -- -- -- -- 95 %   03/06/25 0450 116/63 98.6 °F (37 °C) Oral 67 14 --   03/06/25 0446 -- -- -- 66 -- 93 %   03/06/25 0441 -- -- -- 62 -- 94 %   03/06/25 0436 -- -- -- 64 -- 96 %   03/06/25 0431 -- -- -- 63 -- 95 %   03/06/25 0426 -- -- -- 62 -- 97 %   03/06/25 0421 -- -- -- 63 -- 96 %   03/06/25 0416 -- -- -- 64 -- 96 %   03/06/25 0411 -- -- -- 68 -- 96 %   03/06/25 0406 -- -- -- 63 -- 96 %   03/06/25 0401 -- -- -- 71 -- 96 %   03/06/25 0356 -- -- -- 61 -- 96 %   03/06/25 0355 (!) 106/57 98.5 °F (36.9 °C) Oral 59 16 --   03/06/25 0351 -- -- -- 59 -- 95 %   03/06/25 0346 -- -- -- 59 -- 94 %   03/06/25 0341 -- -- -- 61 -- 94 %   03/06/25 0336 -- -- -- 60 -- 94 %   03/06/25 0331 -- -- -- 63 -- 91 %   03/06/25 0326 -- -- -- 62 -- 91 %   03/06/25 0321 -- -- -- 62 -- 94 %   03/06/25 0316 -- -- -- 63 -- 93 %   03/06/25 0311 -- -- -- 63 -- 94 %   03/06/25 0306 -- -- -- 65 -- 93 %   03/06/25 0301 -- -- -- 69 -- 93 %   03/06/25 0256 -- -- -- 65 -- 93 %   03/06/25 0251 -- -- -- 65 -- 93 %   03/06/25 0246 -- -- -- 65 -- 94 %   03/06/25 0241 (!) 96/53 99.5 °F (37.5 °C) Oral 69 14 95 %   03/06/25 0236 -- -- -- 67 -- 94 %   03/06/25 0231 -- -- -- 72 -- 93 %   03/06/25 0226 -- -- -- 70 -- 93 %   03/06/25 0221 -- -- -- 75 -- 95 %   03/06/25 0215 -- -- -- 71 -- 93 %   03/06/25 0210 -- -- -- 68 -- 93 %   03/06/25 0205 -- -- -- 70 -- 93 %   03/06/25 0200 -- -- -- 76 -- 94 %   03/06/25 0155 -- -- -- 73 -- 95 %   03/06/25 0152 (!) 108/55 99.8 °F (37.7 °C) Oral 69 14 94 %   03/06/25 0150 -- -- -- 69 -- 92 %   03/06/25 0145 -- -- -- 79 -- 93 %   03/06/25 0140 -- -- -- 76 -- 93 % 
Bedside and Verbal shift change report given to Torsten RN (oncoming nurse) by Lamine JOHNSON (offgoing nurse). Report included the following information Nurse Handoff Report, Intake/Output, MAR, and Recent Results.     
Bedside shift change report given to DIMITRIS Quesada RN (oncoming nurse) by EDOUARD Bustos RN (offgoing nurse). Report included the following information Nurse Handoff Report.     
Bedside shift change report given to EDOUARD Bustos RN  (oncoming nurse) by IZABELLA Walton RN  (offgoing nurse). Report included the following information Nurse Handoff Report and MAR.     
Brief note, full assessment to follow    POD 1 s/p repeat  after failed TOLAC, prolonged active stage of labor, complicated by massive postpartum hemorrhage due to uterine atony.   Total EBL 6347cc    Hgb sharonda to 4.1 from 12.9 pre-delivery.     She is currently in the ICU for hemodynamic support.     /74   Pulse 96   Temp 98.6 °F (37 °C) (Axillary)   Resp 24   LMP 2024   SpO2 100%   Breastfeeding Unknown     Vitals improving, BP now normalized and tachycardia improving.    Repeat CBC and coags pending.    Continue blood product replacement.     Mari Gold MD    
CTSP because of clots noted on fundal check. There was about 250 cc of dark clot. I did a manual sweep and only a small amount of additional clot expressed. Fundus now very deep and more challenging to feel but bleeding is ok. Will give 800 mcg rectal cytotec.Given overall blood loss with transfuse a third unit of PRBCs and continue to watch closely.    ADDENDUM 0500: Patient's hemoglobin returned 4.1. Her UOP is adequate however she is tachycardic and pressure is dropping a bit again. Will transfer to ICU for closer monitoring. She has her 3rd bag of blood hanging and will likely need 2-3 more. She only got one unit of FFP because of the second unit not being ready prior to her going to IR. Will transfuse a second unit of FFP. She has watery blood on fundal checks so concerned that she may be developing a coagulopathy. Spoke with ICU doc who agrees to accept her in transfer.  
CTSP because of increased bleeding. She had a large gush of blood and uterus was boggy. Firmed up with massage. I did a manual sweep and cleared out several clots. Methergine given. Bleeding was ok for a few minutes and then another smaller gush and uterus boggy again so NOY placed. Placed easily and cervical balloon filled with 180 cc to create seal. CBC and coags pending. She also got liter bolus.    Total QBL ~3200. Hgb preop 12.9      ADDENDUM 1220A:    Vitals:    03/06/25 2336 03/07/25 0007 03/07/25 0012 03/07/25 0017   BP: (!) 91/38  (!) 86/50 (!) 88/49   Pulse: 65  82 85   Resp:       Temp:       TempSrc:       SpO2:  100% 100% 97%     Pt with continued hypotension and cannot get labs despite multiple attempts by nurses and anesthesia. UOP is adequate and she is not tachycardic. Will transfuse 1U PRBCs.  
I was asked to see patient by Dr. Pimentel as patient has requested a second opinion about the recommendation to have a . The patient has been 8-9 cm since about 945 AM.    I explained to her that I agree with Dr. Pimentel's assessment that with her exam being unchanged for about 7 hours, that a  is recommended for labor arrest.    She feels as though she's being pressured into a .    She wonders if her epidural being dense earlier was preventing her from moving into different positions and if that would have made a difference.    She wonders if she were in a better contraction pattern if that would have made a difference.    I explained that even with no intervention after more than 6 hours we would expect to see change. I explained that although she wasn't able to move much with the epidural that her nurse had been doing position changes with her during the day such as spinning babies and the peanut ball.    I reiterated that our goal is always a vaginal birth. However, we also have to be honest when it appears that vaginal birth is not going to happen. In this case with her cervix being unchanged for so long and a bigger baby,  is recommended. I explained that we cannot force her to have a  and right now the baby is not in distress but that could change. I explained that the longer labor goes, the higher the chances of complications like bleeding and infection and a difficult  or shoulder dystocia if by small chance she delivers vaginally..    At the end of conversation initially which was about 6 PM, she said she wanted to wait another hour at least and she was contemplating 2.    Her nurse then reached out about a pitocin break as she had not had one all day and her labor pattern had been irregular. I also inquired as to whether or not she had Benadryl. She had not.    I offered the patient the option of a 1 hour pitocin break, tums, and benadryl. I explained 
Improved.  But remains tenous      Vitals:    03/07/25 1300   BP: 131/60   Pulse: (!) 104   Resp: 29   Temp:    SpO2: 96%        Post partum hemorrhage requiring MTP.      H/H  still low--->  transfuse two more units PRBCs    Plts 80k.  No further transfusion needed.     TEG from this am indicated cryo needed.  Gave 1 unit cryo.    Lasix 80 x 1 post massive transfusion.     Reconfigured pain meds:      Increased dilaudid to 0.5 q 3 prn  Added tylenol 1 G tid prn    Cont oxy q 6 prn     Added bowel regimen.      Discussed with Dr Gold.  Changed abx to clinda and ancef.      Plan to run another TEG this afternoon       CCM time: 48 mins. This time includes time spent reviewing database, interviewing and examining patient, discussing care plan on MDRs and communicating with other providers    
Labor Progress Note    Patient: Tyrese King YOB: 1990  Age: 34 y.o.     Subjective:     Tyrese is feeling intermittently moderately painful contractions. She also reports passage of blood tinged mucus discharge.     Objective:     Vital Signs:  /75   Pulse 74   Temp 97.9 °F (36.6 °C) (Oral)   Resp 18   LMP 2024   SpO2 98%      Cervical Exam:    By Norm GLYNN in [facility] 1cm/70/-2    FHR cat 1, reactive  [place] ctx every 3 mins    Lab/Data Review:  No results found for this or any previous visit (from the past 24 hour(s)).    Assessment/Plan:     Principal Problem:    Patient desires vaginal birth after  section ()  Resolved Problems:    * No resolved hospital problems. *    Early labor, prior scheduled c/s for placental previa, desires TOLAC. Amnisure weakly positive likely due to contamination of blood, clinically do not suspect ROM.   GDM diet controlled  GBS positive in urine  - pt desires to TOLAC, aware of 1%risk of uterine rupture  - expectant management for now and recheck cervix as clinically indicated  - start PCN when labor becoming active or when ROM  - GDM: accuchecks fasting and 1 hour PP in latent labor, every 2 hours after 6cm, and every 1 hour in second stage      Signed By: Mari Gold MD     2025        
Labor Progress Note    Patient: Tyrese King YOB: 1990  Age: 34 y.o.     Subjective:     Tyrese was feeling painful contractions and just received an epidural. She is feeling much better now.    Objective:     Vital Signs:  /79   Pulse 69   Temp 97.9 °F (36.6 °C) (Oral)   Resp 20   LMP 2024   SpO2 99%      FHR cat 1, reactive  [place] ctx every 3-5 mins    Cervical Exam:        Lab/Data Review:  Recent Results (from the past 24 hour(s))   CBC    Collection Time: 25  3:43 PM   Result Value Ref Range    WBC 10.1 3.6 - 11.0 K/uL    RBC 4.08 3.80 - 5.20 M/uL    Hemoglobin 12.9 11.5 - 16.0 g/dL    Hematocrit 37.6 35.0 - 47.0 %    MCV 92.2 80.0 - 99.0 FL    MCH 31.6 26.0 - 34.0 PG    MCHC 34.3 30.0 - 36.5 g/dL    RDW 13.9 11.5 - 14.5 %    Platelets 125 (L) 150 - 400 K/uL    MPV 12.9 8.9 - 12.9 FL    Nucleated RBCs 0.0 0  WBC    nRBC 0.00 0.00 - 0.01 K/uL   TYPE AND SCREEN    Collection Time: 25  3:43 PM   Result Value Ref Range    Crossmatch expiration date 2025,2359     ABO/Rh O POSITIVE     Antibody Screen NEG    POCT Glucose    Collection Time: 25  5:49 PM   Result Value Ref Range    POC Glucose 113 65 - 117 mg/dL    Performed by: JOSE MIGUEL Castro        Assessment/Plan:     Principal Problem:    Patient desires vaginal birth after  section ()  Resolved Problems:    * No resolved hospital problems. *    Term labor, TOLAC (prior c/s for previa). GBS pos. GDM diet controlled.  - expectant management; can augment if contractions space  - epidural for pain control  - PCN for GBS ppx    Will sign out overnight labor management to AYESHA Villegas.     Signed By: Mari Gold MD     2025        
Labor Progress Note  Patient seen, fetal heart rate and contraction pattern evaluated, patient examined.  Now comfortable with epidural  /67   Pulse 68   Temp 97.9 °F (36.6 °C) (Oral)   Resp 20   LMP 2024   SpO2 98%     Physical Exam:  Cervical Exam:  deferred  Membranes:  Intact  Uterine Activity: Frequency: Every 3-5 minutes  Fetal Heart Rate: Baseline: 140 per minute  Variability: moderate  Accelerations: yes  Decelerations: none    Assessment/Plan:    Early labor  TOLAC  Category 1 FHT  GBS+ - start antibiotics ~ midnight    If contraction pattern spaces out, consider pitocin augmentation  Anticipate                  
Patient having persistent hypotension. And now bleeding moderately around the NOY. Hemacute was 8. They have now gotten an IV but cannot get labs. She is pale but talking. Other vital signs are stable. Ordered a second unit of blood and 2 units of FFP. She's had 2L bolus. Also called interventional radiology for embolization. Spoke with Dr. Murillo and team is headed in. Also consulted ICU for ICU placement postpartum. Dr. Carson is coming to see her. Explained situation to patient and she agrees to proceed.  
Post-Operative  Day 3    Tyrese Christine     Assessment: Post-Op day 3  Pt feeling very tired and dizzy with standing and moving   States she feels weak and that she fades in and out.     Reviewed labs and concerns with MATILDE Pimentel, plan to transfuse I unit PRBC  Replete K with one time dose 40mEq     Plan:   1. Blood transfusion  2. Routine post op care  3. Rest   4. CBC 4 hours after transfusion  5. Replete K    Information for the patient's :  Christine, BOY Tyrese [[id number]]   , Low Transverse     Nausea and vomiting resolved  Tolerating PO and PO meds some  Passing flatus  BM yes no: No    Vitals:  /85   Pulse 65   Temp 97.7 °F (36.5 °C) (Oral)   Resp 16   Wt 92 kg (202 lb 13.2 oz)   LMP 2024   SpO2 94%   Breastfeeding Unknown   BMI 37.10 kg/m²   Temp (24hrs), Av.1 °F (36.7 °C), Min:97.7 °F (36.5 °C), Max:98.8 °F (37.1 °C)    Exam:    A,A&Ox3      Patient without distress but emotional  Breasts soft, NT  Abdomen soft expected tenderness  FF scant Lochia Rubra  Wound incision clean, dry and intact  Lower extremities are  moderate edema     Labs:   Lab Results   Component Value Date/Time    WBC 12.9 2025 10:00 AM    WBC 12.8 2025 09:09 AM    WBC 14.5 2025 03:43 AM    WBC 12.7 2025 02:05 PM    WBC 14.2 2025 06:50 AM    WBC 14.6 2025 03:43 AM    WBC 17.7 2025 08:20 PM    WBC 10.1 2025 03:43 PM    WBC 9.2 2024 02:09 PM    WBC 7.2 2024 03:40 PM    WBC 8.8 2022 06:28 AM    WBC 7.9 2022 04:22 PM    WBC 8.8 2022 09:53 AM    WBC 9.3 2022 02:35 PM    HGB 7.1 2025 10:00 AM    HGB 7.9 2025 09:09 AM    HGB 8.4 2025 03:43 AM    HGB 6.4 2025 02:05 PM    HGB 6.1 2025 06:50 AM    HGB 4.1 2025 03:43 AM    HGB 12.9 2025 08:20 PM    HGB 12.9 2025 03:43 PM    HGB 12.5 2024 02:09 PM    HGB 12.9 2024 03:40 PM    HGB 9.1 2022 06:28 AM    HGB 12.5 
[facility]  [facility]         Information for the patient's :  Christine, BOY Tyrese [[id number]]   , Low Transverse Patient doing well without unusual complaints. Patient notes complete resolution with current pain medications. Patient reports normal lochia.  She is currently tolerating general diet without nausea or vomiting. She reports flatus yes.     Vitals:  BP (!) 135/90   Pulse 88   Temp 97.7 °F (36.5 °C) (Oral)   Resp 16   Wt 92 kg (202 lb 13.2 oz)   LMP 2024   SpO2 97%   Breastfeeding Unknown   BMI 37.10 kg/m²   Temp (24hrs), Av.1 °F (36.7 °C), Min:97.7 °F (36.5 °C), Max:98.5 °F (36.9 °C)      Last 24hr Input/Output:  No intake or output data in the 24 hours ending 25 0728       Exam:   Gen: Patient without distress.  CV: Regular rate  Chest: Breathing non labored  Abdomen:soft, expected no tenderness, fundus firm @U-2; incision c/d/i  Lower extremities are no tenderness with mild   edema.    Labs:   Lab Results   Component Value Date/Time    WBC 9.3 2025 04:22 AM    WBC 8.0 2025 05:27 AM    WBC 9.5 03/10/2025 04:51 AM    WBC 12.9 2025 09:00 AM    WBC 12.8 2025 09:09 AM    WBC 14.5 2025 03:43 AM    WBC 12.7 2025 02:05 PM    WBC 14.2 2025 06:50 AM    WBC 14.6 2025 03:43 AM    WBC 17.7 2025 08:20 PM    WBC 10.1 2025 03:43 PM    WBC 9.2 2024 02:09 PM    WBC 7.2 2024 03:40 PM    WBC 8.8 2022 06:28 AM    WBC 7.9 2022 04:22 PM    WBC 8.8 2022 09:53 AM    WBC 9.3 2022 02:35 PM    HGB 9.8 2025 04:22 AM    HGB 9.4 2025 05:27 AM    HGB 8.4 03/10/2025 04:51 AM    HGB 7.1 2025 09:00 AM    HGB 7.9 2025 09:09 AM    HGB 8.4 2025 03:43 AM    HGB 6.4 2025 02:05 PM    HGB 6.1 2025 06:50 AM    HGB 4.1 2025 03:43 AM    HGB 12.9 2025 08:20 PM    HGB 12.9 2025 03:43 PM    HGB 12.5 2024 02:09 PM    HGB 12.9 2024 
Post-Partum Day Number 2 Progress Note    Tyrese Christine     Assessment and Plan: post partum day 2 s/p RLTCS after failed TOLAC and prolonged active phase of labor. Complicated by uterine atony and massive postpartum hemorrhage ~6L EBL. S/p IR embolization of bilateral uterine arteries. Can possibly leave ICU --> MIU later today if platelets >50, bleeding appropriate, and remains afebrile until 1pm. Discussed plan with ICU Dr. Menezes, appreciate assistance.    Post-operative/postpartum care:  - Continue routine postpartum and perineal care as well as maternal education  - continue nursing support, has met with lactation specialist today  - monitor lochia; ice packs to perineum for edema as needed  - apply SCDs for VTE ppx until fully ambulatory   - currently tylenol/roxicodone prn dilaudid for pain, consider adding back ibuprofen now that kidney function recovered and once plts improved  - not yet passing gas, encouraged ambulation today and minimizing narcotics as much as possible to avoid ileus, but good appetite and no nausea at this time.    Postpartum hemorrhage: total QBL 6347cc. Starting Hgb 12.9, down to 4.1 overnight. Hgb now 8.4. S/p multiple uterotonic agents, isadora device, and IR embolization of bilateral uterine arteries. She is s/p transfusion of 4 units PRBCs, platelets, and cryo. Vital signs are normalizing and she is making good urine output and urine is clear.   - continue to trend CBC and coags and replete with products as indicated  - goal Hgb for surgical healing >7  - thrombocytopenia, plts this morning dropped to 44, ICU team rechecking plts at this time, plans to transfuse to keep > 50  - in ICU for hemodynamic support and close care; appreciate their help in management    Acute Kidney injury, resolving: in setting of congential absence of 1 kidney.  Prior normal baseline creatinine. Creatinine max 1.64 yesterday, now resolving, 0.89 this morning. UOP great and urine now clear.   - susan bernard this 
Post-Partum Day Number 4 Progress Note    Tyrese Christine     Assessment and Plan: post partum day 4 s/p RLTCS after failed TOLAC and prolonged active phase of labor. Complicated by uterine atony and massive postpartum hemorrhage ~6L EBL. S/p IR embolization of bilateral uterine arteries.     Post-operative/postpartum care:  - Continue routine postpartum and perineal care as well as maternal education  - Monitor lochia; ice packs to perineum for edema as needed  - Continue SCDs for VTE ppx  - Possible discharge home versus tomorrow. Encourage ambulation today    Postpartum hemorrhage: total QBL 6347cc. Starting Hgb 12.9, down to 4.1. Hgb now 8.4. S/p multiple uterotonic agents, isadora device, and IR embolization of bilateral uterine arteries. She is s/p transfusion of 6 units PRBCs, platelets, and cryo.   - Trend AM CBCs    Acute Kidney injury, resolving: in setting of congential absence of 1 kidney.  Prior normal baseline creatinine. Creatinine max 1.64 , now resolving  - AM Cr 0.7    Intraamniotic infection: last fever 12:45 pm on 3/7, dx with fever intrapartum. Was started on gent and clinda. Had received mutliple doses of IV PCN for GBS pos ppx. With , a dose of gent and ancef were also added per usual protocol.    - Completed IV antibiotics (ancef and clinda) for 24 hours afebrile     Mild range blood pressures  - Continue to monitor    Desires circumcision  - The risks and benefits of the circumcision  procedure and anesthesia including: bleeding, infection, variability of cosmetic results were discussed at length with the mother. She is aware that future repeat procedures may be necessary. She gives informed consent to proceed as noted and her questions are answered.     Information for the patient's :  LETTY King [[id number]]   , Low Transverse     S:  This morning, Tyrese is doing well. Reports she is overall sore. Some pain/cramping after nursing, but overall pain controlled. Does feel 
Post-Partum Day Number 5 Progress Note    Tyrese Christine     Assessment and Plan: post partum day 5 s/p RLTCS after failed TOLAC and prolonged active phase of labor. Complicated by uterine atony and massive postpartum hemorrhage ~6L EBL. S/p IR embolization of bilateral uterine arteries.     Post-operative/postpartum care:  - Continue routine postpartum and perineal care as well as maternal education  - Monitor lochia; ice packs to perineum for edema as needed  - Continue SCDs for VTE ppx  - encourage ambulation    Postpartum hemorrhage: total QBL 6347cc. Starting Hgb 12.9, down to 4.1. Hgb now 9.4. S/p multiple uterotonic agents, isadora device, and IR embolization of bilateral uterine arteries. She is s/p transfusion of 6 units PRBCs, platelets, and cryo.     Acute Kidney injury, resolved: in setting of congential absence of 1 kidney.  Prior normal baseline creatinine. Creatinine max 1.64 , now 0.78.     Intraamniotic infection: resolved, last fever 12:45 pm on 3/7, s/p IV antibiotics    Postpartum Preeclampsia: persistent mild range Bps and mild transaminitis (elevated LFTs)  - started on procardia 30mg XL  - serial labs: CBC, CMP --> repeat labs in AM, hopefully home tomorrow if Bps controlled and TFTs improving  - continue to monitor Bps    Postpartum depression / trauma reaction: EPDS 13  - she has been given resources for 7 starling, [email or web address]  - declines medication at this time and contracts for safety    Social/PP plans:  - FOB [place]  - pumping/nursing  - BOY \"Quentin\", s/p circ    Information for the patient's :  LETTY King Tyrese [[id number]]   , Low Transverse     S:  Tyrese is doing well. Was resting comfortably when I first went to see her this morning, then checked on her mid-day and she was up ambulating in the room. Reports she is overall feeling better. Some pain/cramping after nursing, but overall pain controlled. Ambulating more in past 24 hrs. Tolerating PO, passing gas. Voiding without 
Pt sleeping, I spoke with . I will come back to see her when she is awake. Prioritizing sleep at this time.    RAHUL OBANDO CNM'    
Reviewed blood pressures- consistently in the mild range. Will plan to start nifedipine 30 XL. Repeat CBC and CMP in AM.     No current headaches. Denies dizziness with ambulation, overall feeling better    Glory Camarena MD    
TEG is now normalized.    Jolly good.    No more products needed.   
TRANSFER - IN REPORT:    Verbal report received from ELIZABETH Ingram on [facility]  being received from L&D for ordered procedure      Report consisted of patient's Situation, Background, Assessment and   Recommendations(SBAR).     Information from the following report(s) Nurse Handoff Report was reviewed with the receiving nurse.    Opportunity for questions and clarification was provided.      Assessment completed upon patient's arrival to unit and care assumed.      Patient placed on angio table with assistance. IR Staff and anesthesia present at bedside. Anesthesia to remain at bedside to manage pt airway, medications, VS and pt status.   
TRANSFER - OUT REPORT:    Ver[facility]  [facility] to L&D for routine post-op       Report consisted of patient's Situation, Background, Assessment and   Recommendations(SBAR).     Information from the following report(s) Nurse Handoff Report and Surgery Report was reviewed with the receiving nurse.           Lines:   Peripheral IV 03/05/25 Right Forearm (Active)   Site Assessment Clean, dry & intact 03/06/25 0603   Line Status Infusing 03/06/25 0603   Phlebitis Assessment No symptoms 03/06/25 0603   Infiltration Assessment 0 03/06/25 0603   Alcohol Cap Used Yes 03/05/25 1544   Dressing Status Clean, dry & intact 03/06/25 0603   Dressing Type Transparent 03/06/25 0603       Peripheral IV 03/07/25 Distal;Right Hand (Active)        Opportunity for questions and clarification was provided.      Patient transported with:  Monitor, O2 @ 2lpm, and Registered Nurse       
The beginning of Daylight Saving Time occurred at 0200 hrs. Documentation of patient care and medications administered is done with respect to the time change.  
Verbal shift change report given to Lamine JOHNSON (oncoming nurse) by Gretchen Graf RN (offgoing nurse). Report included the following information Nurse Handoff Report, Adult Overview, MAR, and Recent Results.      2120- Blood transfusion completed. Pt tolerated well.  
Fibrinogen 164 (L) 200 - 475 mg/dL   TEG Global Hemostasis with Lysis    Collection Time: 03/07/25  6:50 AM   Result Value Ref Range    Citrated Kaolin R Time 3.7 (L) 4.6 - 9.1 MINS    Citrated Kaolin LY30 0.0 0.0 - 2.6 %    Cit RapidTEG Max Amp 53.4 52.0 - 70.0 mm    Cit Func Fib Max Amp 12.2 (L) 15.0 - 32.0 mm   PREPARE CRYOPRECIPITATE, 1 Product    Collection Time: 03/07/25 10:45 AM   Result Value Ref Range    Unit Number [id number]     Product Code Blood Bank CRYO,5U Thaw     Unit Divison 00     Dispense Status Blood Bank ALLOCATED

## 2025-03-12 NOTE — LACTATION NOTE
This note was copied from a baby's chart.  Mother last pumped 40 ml of breast milk. Mother's milk is coming in, mother states that her breasts feel heavy. Educated mother about engorgement care. Mother had baby latched on the right breast. Audible gulping at the breast. Educated mother about offering both breasts at every session and letting baby fall asleep on one breast before offering the second.

## 2025-03-12 NOTE — DISCHARGE SUMMARY
Discharge Summary    Date: 3/12/2025  Patient Name: Tyrese King    YOB: 1990     Age: 34 y.o.    Admit Date: 3/5/2025  Discharge Date: 3/12/2025  Discharge Condition: Stable    Admission Diagnosis  Patient desires vaginal birth after  section () [O34.219]      Discharge Diagnosis  Principal Problem (Resolved):    Patient desires vaginal birth after  section ()  Active Problems:    S/P emergency       Hospital Stay  Narrative of Hospital Course:  Pt admitted for a trial of labor after . She underwent an emergent  and had postpartum hemorrhage. She received 6 units of RBC and then transferred to the ICU. After stabilization, she was transferred to postpartum. She was diagnosed with postpartum pre-eclampsia and was started on nifedipine. BP was stabilized. She was deemed appropriate for discharge.     Consultants:  IP CONSULT TO INTERVENTIONAL RADIOLOGY  IP CONSULT TO LACTATION    Surgeries/procedures Performed:      Treatments:    Surgery        Discharge Plan/Disposition:  Home    Hospital/Incidental Findings Requiring Follow Up:    Patient Instructions:    Diet: Regular Diet    Activity:No Lifting, Driving or Strenuous Excercise, No Driving for 2 Weeks, No Sex for and No Heavy Lifting  For number of days (if applicable): 6      Other Instructions:    Provider Follow-Up:   No follow-ups on file.     Significant Diagnostic Studies:    Recent Labs:  Admission on 2025  No results displayed because visit has over 200 results.    ------------    Radiology last 7 days:  IR EMBOLIZATION HEMORRHAGE  Result Date: 3/7/2025  1. Pelvic angiography reveals active bleeding from a distal branch of the left uterine artery. 2. Bilateral uterine artery Gelfoam embolization was performed, with resolution of the bleeding on postembolization angiography. Electronically signed by ISIS ROBERTO       Pending Labs     Order Current Status    PREPARE PLASMA, 1 Units In

## 2025-03-13 LAB
BLD PROD TYP BPU: NORMAL
BLD PROD TYP BPU: NORMAL
BLOOD BANK BLOOD PRODUCT EXPIRATION DATE: NORMAL
BLOOD BANK DISPENSE STATUS: NORMAL
BLOOD BANK DISPENSE STATUS: NORMAL
BLOOD BANK ISBT PRODUCT BLOOD TYPE: 5100
BLOOD BANK PRODUCT CODE: NORMAL
BLOOD BANK UNIT TYPE AND RH: NORMAL
BPU ID: NORMAL
BPU ID: NORMAL
UNIT DIVISION: 0
UNIT DIVISION: 0
UNIT ISSUE DATE/TIME: NORMAL

## 2025-03-14 ENCOUNTER — TELEPHONE (OUTPATIENT)
Age: 35
End: 2025-03-14

## 2025-03-14 ENCOUNTER — HOSPITAL ENCOUNTER (EMERGENCY)
Facility: HOSPITAL | Age: 35
Discharge: HOME | End: 2025-03-14
Attending: EMERGENCY MEDICINE
Payer: COMMERCIAL

## 2025-03-14 VITALS
SYSTOLIC BLOOD PRESSURE: 144 MMHG | OXYGEN SATURATION: 99 % | RESPIRATION RATE: 16 BRPM | TEMPERATURE: 97.5 F | HEART RATE: 84 BPM | DIASTOLIC BLOOD PRESSURE: 99 MMHG

## 2025-03-14 DIAGNOSIS — R51.9 ACUTE NONINTRACTABLE HEADACHE, UNSPECIFIED HEADACHE TYPE: ICD-10-CM

## 2025-03-14 DIAGNOSIS — N30.01 ACUTE CYSTITIS WITH HEMATURIA: ICD-10-CM

## 2025-03-14 DIAGNOSIS — R42 DIZZINESS: ICD-10-CM

## 2025-03-14 DIAGNOSIS — R53.1 GENERALIZED WEAKNESS: Primary | ICD-10-CM

## 2025-03-14 LAB
ALBUMIN SERPL-MCNC: 3 G/DL (ref 3.5–5)
ALBUMIN/GLOB SERPL: 0.7 (ref 1.1–2.2)
ALP SERPL-CCNC: 121 U/L (ref 45–117)
ALT SERPL-CCNC: 42 U/L (ref 12–78)
ANION GAP SERPL CALC-SCNC: 4 MMOL/L (ref 2–12)
APPEARANCE UR: ABNORMAL
AST SERPL-CCNC: 25 U/L (ref 15–37)
BACTERIA URNS QL MICRO: NEGATIVE /HPF
BASOPHILS # BLD: 0 K/UL (ref 0–0.1)
BASOPHILS NFR BLD: 0 % (ref 0–1)
BILIRUB SERPL-MCNC: 0.6 MG/DL (ref 0.2–1)
BILIRUB UR QL: NEGATIVE
BUN SERPL-MCNC: 12 MG/DL (ref 6–20)
BUN/CREAT SERPL: 17 (ref 12–20)
CALCIUM SERPL-MCNC: 8.9 MG/DL (ref 8.5–10.1)
CHLORIDE SERPL-SCNC: 106 MMOL/L (ref 97–108)
CO2 SERPL-SCNC: 26 MMOL/L (ref 21–32)
COLOR UR: ABNORMAL
CREAT SERPL-MCNC: 0.72 MG/DL (ref 0.55–1.02)
DIFFERENTIAL METHOD BLD: ABNORMAL
EOSINOPHIL # BLD: 0.2 K/UL (ref 0–0.4)
EOSINOPHIL NFR BLD: 2 % (ref 0–7)
EPITH CASTS URNS QL MICRO: ABNORMAL /LPF
ERYTHROCYTE [DISTWIDTH] IN BLOOD BY AUTOMATED COUNT: 16.2 % (ref 11.5–14.5)
GLOBULIN SER CALC-MCNC: 4.4 G/DL (ref 2–4)
GLUCOSE SERPL-MCNC: 117 MG/DL (ref 65–100)
GLUCOSE UR STRIP.AUTO-MCNC: NEGATIVE MG/DL
HCT VFR BLD AUTO: 34.8 % (ref 35–47)
HGB BLD-MCNC: 11.1 G/DL (ref 11.5–16)
HGB UR QL STRIP: ABNORMAL
HYALINE CASTS URNS QL MICRO: ABNORMAL /LPF (ref 0–5)
IMM GRANULOCYTES # BLD AUTO: 0 K/UL
IMM GRANULOCYTES NFR BLD AUTO: 0 %
KETONES UR QL STRIP.AUTO: NEGATIVE MG/DL
LEUKOCYTE ESTERASE UR QL STRIP.AUTO: ABNORMAL
LYMPHOCYTES # BLD: 2.87 K/UL (ref 0.8–3.5)
LYMPHOCYTES NFR BLD: 29 % (ref 12–49)
MCH RBC QN AUTO: 28.8 PG (ref 26–34)
MCHC RBC AUTO-ENTMCNC: 31.9 G/DL (ref 30–36.5)
MCV RBC AUTO: 90.2 FL (ref 80–99)
MONOCYTES # BLD: 0.99 K/UL (ref 0–1)
MONOCYTES NFR BLD: 10 % (ref 5–13)
NEUTS SEG # BLD: 5.84 K/UL (ref 1.8–8)
NEUTS SEG NFR BLD: 59 % (ref 32–75)
NITRITE UR QL STRIP.AUTO: NEGATIVE
NRBC # BLD: 0.05 K/UL (ref 0–0.01)
NRBC BLD-RTO: 0.5 PER 100 WBC
PH UR STRIP: 6.5 (ref 5–8)
PLATELET # BLD AUTO: 446 K/UL (ref 150–400)
PMV BLD AUTO: 8.8 FL (ref 8.9–12.9)
POTASSIUM SERPL-SCNC: 3.8 MMOL/L (ref 3.5–5.1)
PROT SERPL-MCNC: 7.4 G/DL (ref 6.4–8.2)
PROT UR STRIP-MCNC: ABNORMAL MG/DL
RBC # BLD AUTO: 3.86 M/UL (ref 3.8–5.2)
RBC #/AREA URNS HPF: ABNORMAL /HPF (ref 0–5)
RBC MORPH BLD: ABNORMAL
SODIUM SERPL-SCNC: 136 MMOL/L (ref 136–145)
SP GR UR REFRACTOMETRY: 1.01 (ref 1–1.03)
SPECIMEN HOLD: NORMAL
UROBILINOGEN UR QL STRIP.AUTO: 0.2 EU/DL (ref 0.2–1)
WBC # BLD AUTO: 9.9 K/UL (ref 3.6–11)
WBC URNS QL MICRO: ABNORMAL /HPF (ref 0–4)

## 2025-03-14 PROCEDURE — 99284 EMERGENCY DEPT VISIT MOD MDM: CPT

## 2025-03-14 PROCEDURE — 85025 COMPLETE CBC W/AUTO DIFF WBC: CPT

## 2025-03-14 PROCEDURE — 93005 ELECTROCARDIOGRAM TRACING: CPT

## 2025-03-14 PROCEDURE — 81001 URINALYSIS AUTO W/SCOPE: CPT

## 2025-03-14 PROCEDURE — 80053 COMPREHEN METABOLIC PANEL: CPT

## 2025-03-14 RX ORDER — CEPHALEXIN 500 MG/1
500 CAPSULE ORAL 3 TIMES DAILY
Qty: 15 CAPSULE | Refills: 0 | Status: SHIPPED | OUTPATIENT
Start: 2025-03-14 | End: 2025-03-19

## 2025-03-14 ASSESSMENT — PAIN - FUNCTIONAL ASSESSMENT
PAIN_FUNCTIONAL_ASSESSMENT: 0-10
PAIN_FUNCTIONAL_ASSESSMENT: 0-10

## 2025-03-14 ASSESSMENT — PAIN DESCRIPTION - LOCATION: LOCATION: LEG

## 2025-03-14 ASSESSMENT — LIFESTYLE VARIABLES
HOW MANY STANDARD DRINKS CONTAINING ALCOHOL DO YOU HAVE ON A TYPICAL DAY: PATIENT DOES NOT DRINK
HOW OFTEN DO YOU HAVE A DRINK CONTAINING ALCOHOL: NEVER

## 2025-03-14 ASSESSMENT — PAIN SCALES - GENERAL
PAINLEVEL_OUTOF10: 0
PAINLEVEL_OUTOF10: 3

## 2025-03-14 ASSESSMENT — PAIN DESCRIPTION - ORIENTATION: ORIENTATION: RIGHT;LEFT

## 2025-03-14 NOTE — ED NOTES
34-year-old female presents with fatigue, numbness, lightheadedness, and vomiting.  Sent by Dr. Pimentel, her OB, for evaluation due to s/p emergency  section with hemorrhage and embolization.  Recently transfused.  Denies fevers, edema, chest pain, difficulty breathing, syncope, hematochezia, vision/speech disturbance, urinary symptoms..  Endorses pelvic pain.  Minimal vaginal bleeding.  Breastfeeding.       6:12 PM  I have evaluated the patient as the Provider in Rapid Medical Evaluation (RME). I have reviewed her vital signs and the triage nurse assessment. I have talked with the patient and any available family and advised that I am the provider in triage and have ordered the appropriate study to initiate their work up based on the clinical presentation during my assessment. I have advised that the patient will be accommodated in the Main ED as soon as possible. I have also requested to contact the triage nurse or myself immediately if the patient experiences any changes in their condition during this brief waiting period.  RAHUL Winter NP, Kelly E, APRN - NP  25 [phone]       Tiffanie Lyle APRN - NP  25 [phone]

## 2025-03-14 NOTE — ED PROVIDER NOTES
[facility] EMERGENCY DEPARTMENT  EMERGENCY DEPARTMENT ENCOUNTER      Pt Name: Tyrese King  MRN: [id number]  Birthdate 1990  Date of evaluation: 3/14/2025  Provider: Uli Og MD    CHIEF COMPLAINT       Chief Complaint   Patient presents with    Fatigue         HISTORY OF PRESENT ILLNESS   (Location/Symptom, Timing/Onset, Context/Setting, Quality, Duration, Modifying Factors, Severity)  Note limiting factors.   34-year-old with a history of gestational diabetes.  She also reports that she has a bicornate uterus and only 1 kidney.  She is status post  8 days ago.  She was 39 weeks.  She states that her antepartum and postpartum course were complicated by significant blood loss (\"at least 6 liters\") and elevated blood pressure.  She received blood transfusions.  She was started on blood pressure medicine which she reports taking.  She presents with complaints of fatigue, dizziness, headache.  She states that she had some of these symptoms while hospitalized.  She was discharged. yesterday.  She was referred back to the ED.  She had posttussive emesis 2-3 times yesterday.  She also complains of bilateral lateral thigh numbness and discomfort.          Review of External Medical Records:     Nursing Notes were reviewed.    REVIEW OF SYSTEMS    (2-9 systems for level 4, 10 or more for level 5)     Review of Systems    Except as noted above the remainder of the review of systems was reviewed and negative.       PAST MEDICAL HISTORY     Past Medical History:   Diagnosis Date    Diabetes (HCC)     Yes. Gestational on Metformin    Gestational diabetes     Kidney congenitally absent, right     Only left kidney present. Enlarged good function         SURGICAL HISTORY       Past Surgical History:   Procedure Laterality Date     SECTION N/A 3/6/2025     SECTION performed by Mira Pate MD at [facility] L&D OR    IR EMBOLIZATION HEMORRHAGE  3/7/2025    IR EMBOLIZATION HEMORRHAGE 3/7/2025 [facility] RAD  more for level 5)     ED Triage Vitals [03/14/25 1814]   BP Systolic BP Percentile Diastolic BP Percentile Temp Temp Source Pulse Respirations SpO2   (!) 143/103 -- -- 97.5 °F (36.4 °C) Oral 89 18 98 %      Height Weight         -- --             There is no height or weight on file to calculate BMI.    Physical Exam  Vitals and nursing note reviewed.   Constitutional:       Appearance: Normal appearance.   HENT:      Head: Normocephalic and atraumatic.      Mouth/Throat:      Mouth: Mucous membranes are moist.   Eyes:      Conjunctiva/sclera: Conjunctivae normal.   Cardiovascular:      Rate and Rhythm: Normal rate and regular rhythm.      Heart sounds: No murmur heard.     No friction rub. No gallop.   Pulmonary:      Effort: Pulmonary effort is normal.      Breath sounds: Normal breath sounds.   Abdominal:      General: There is no distension.      Tenderness: There is no abdominal tenderness.   Musculoskeletal:         General: No swelling or deformity.      Cervical back: No rigidity.   Skin:     General: Skin is warm and dry.   Neurological:      General: No focal deficit present.      Mental Status: She is alert.   Psychiatric:         Mood and Affect: Mood normal.         DIAGNOSTIC RESULTS     EKG: All EKG's are interpreted by the Emergency Department Physician who either signs or Co-signs this chart in the absence of a cardiologist.        RADIOLOGY:   Non-plain film images such as CT, Ultrasound and MRI are read by the radiologist. Plain radiographic images are visualized and preliminarily interpreted by the emergency physician with the below findings:        Interpretation per the Radiologist below, if available at the time of this note:    No orders to display        LABS:  Labs Reviewed   CBC WITH AUTO DIFFERENTIAL - Abnormal; Notable for the following components:       Result Value    Hemoglobin 11.1 (*)     Hematocrit 34.8 (*)     RDW 16.2 (*)     Platelets 446 (*)     MPV 8.8 (*)     Nucleated RBCs

## 2025-03-14 NOTE — TELEPHONE ENCOUNTER
Attempted to jose pt, no answer left vm that we did receive her my-chart message and per Dr. Pimentel patient should present to the ER.

## 2025-03-14 NOTE — ED TRIAGE NOTES
Patient arrives in wheelchair with complaints of fatigue, lightheadedness. Vomiting yesterday. Denies chest pain.    Patient had  3/, during which she lost a lot of blood. Was advised by OB to go to ED.

## 2025-03-14 NOTE — TELEPHONE ENCOUNTER
Called patient x2 - patient is aware of providers recommendation to be seen in the ER for evaluation due to sx and recent emergency c/s. Patient verbalized understanding and relays that she will go in to the ER

## 2025-03-15 LAB
EKG ATRIAL RATE: 81 BPM
EKG DIAGNOSIS: NORMAL
EKG P AXIS: 44 DEGREES
EKG P-R INTERVAL: 138 MS
EKG Q-T INTERVAL: 364 MS
EKG QRS DURATION: 84 MS
EKG QTC CALCULATION (BAZETT): 422 MS
EKG R AXIS: -17 DEGREES
EKG T AXIS: 21 DEGREES
EKG VENTRICULAR RATE: 81 BPM

## 2025-03-19 ENCOUNTER — POSTPARTUM VISIT (OUTPATIENT)
Age: 35
End: 2025-03-19
Payer: COMMERCIAL

## 2025-03-19 VITALS
BODY MASS INDEX: 30.91 KG/M2 | SYSTOLIC BLOOD PRESSURE: 112 MMHG | WEIGHT: 168 LBS | HEART RATE: 95 BPM | RESPIRATION RATE: 14 BRPM | HEIGHT: 62 IN | DIASTOLIC BLOOD PRESSURE: 72 MMHG | OXYGEN SATURATION: 96 %

## 2025-03-19 DIAGNOSIS — N17.9 AKI (ACUTE KIDNEY INJURY): ICD-10-CM

## 2025-03-19 DIAGNOSIS — O14.93 PRE-ECLAMPSIA IN THIRD TRIMESTER: ICD-10-CM

## 2025-03-19 LAB
ALBUMIN SERPL-MCNC: 3.2 G/DL (ref 3.5–5)
ALBUMIN/GLOB SERPL: 0.9 (ref 1.1–2.2)
ALP SERPL-CCNC: 106 U/L (ref 45–117)
ALT SERPL-CCNC: 32 U/L (ref 12–78)
ANION GAP SERPL CALC-SCNC: 7 MMOL/L (ref 2–12)
AST SERPL-CCNC: 21 U/L (ref 15–37)
BILIRUB SERPL-MCNC: 0.4 MG/DL (ref 0.2–1)
BUN SERPL-MCNC: 13 MG/DL (ref 6–20)
BUN/CREAT SERPL: 18 (ref 12–20)
CALCIUM SERPL-MCNC: 9.5 MG/DL (ref 8.5–10.1)
CHLORIDE SERPL-SCNC: 105 MMOL/L (ref 97–108)
CO2 SERPL-SCNC: 25 MMOL/L (ref 21–32)
CREAT SERPL-MCNC: 0.71 MG/DL (ref 0.55–1.02)
ERYTHROCYTE [DISTWIDTH] IN BLOOD BY AUTOMATED COUNT: 16.6 % (ref 11.5–14.5)
GLOBULIN SER CALC-MCNC: 3.7 G/DL (ref 2–4)
GLUCOSE SERPL-MCNC: 117 MG/DL (ref 65–100)
HCT VFR BLD AUTO: 34.2 % (ref 35–47)
HGB BLD-MCNC: 10.8 G/DL (ref 11.5–16)
MCH RBC QN AUTO: 28.7 PG (ref 26–34)
MCHC RBC AUTO-ENTMCNC: 31.6 G/DL (ref 30–36.5)
MCV RBC AUTO: 91 FL (ref 80–99)
NRBC # BLD: 0 K/UL (ref 0–0.01)
NRBC BLD-RTO: 0 PER 100 WBC
PLATELET # BLD AUTO: 443 K/UL (ref 150–400)
PMV BLD AUTO: 8.9 FL (ref 8.9–12.9)
POTASSIUM SERPL-SCNC: 4.6 MMOL/L (ref 3.5–5.1)
PROT SERPL-MCNC: 6.9 G/DL (ref 6.4–8.2)
RBC # BLD AUTO: 3.76 M/UL (ref 3.8–5.2)
SODIUM SERPL-SCNC: 137 MMOL/L (ref 136–145)
WBC # BLD AUTO: 9.3 K/UL (ref 3.6–11)

## 2025-03-19 PROCEDURE — 99214 OFFICE O/P EST MOD 30 MIN: CPT | Performed by: OBSTETRICS & GYNECOLOGY

## 2025-03-19 PROCEDURE — G8417 CALC BMI ABV UP PARAM F/U: HCPCS | Performed by: OBSTETRICS & GYNECOLOGY

## 2025-03-19 PROCEDURE — G8427 DOCREV CUR MEDS BY ELIG CLIN: HCPCS | Performed by: OBSTETRICS & GYNECOLOGY

## 2025-03-19 PROCEDURE — 1036F TOBACCO NON-USER: CPT | Performed by: OBSTETRICS & GYNECOLOGY

## 2025-03-19 PROCEDURE — 1111F DSCHRG MED/CURRENT MED MERGE: CPT | Performed by: OBSTETRICS & GYNECOLOGY

## 2025-03-19 NOTE — PROGRESS NOTES
Tyrese King is a 34 y.o. female returns for a routine post-partum mood check & bp check    Relays that she feels fatigue and like she is not getting enough rest or eating enough    Has been keeping track of blood pressures once daily. Has reading on her phone. The reading have varied but this week have been lower.    Chief Complaint   Patient presents with    Follow-up    mood check    Blood Pressure Check       Postpartum Depression: High Risk (3/9/2025)    [place]  Depression Scale     Last EPDS Total Score: 13     Last EPDS Self Harm Result: Never         Type of delivery: c/s  Date of Delivery: 3/6/2025  Breastfeeding: breast  Bleeding Resolved: yes  Mood: EPDS 13, mood improved  Pain: 3/10  Birth Control:   Last Pap: 2022 NIL,       1. Have you been to the ER, urgent care clinic, or hospitalized since your last visit? yes    2. Have you seen or consulted any other health care providers outside of the [facility] since your last visit?     Examination chaperoned:    Fidelina Rosas LPN.  
additional days of hospital admission and a ED presentation.   No signs of severe disease today, edema resolved.  Check preE labs.  Continue nifedipine 30mg XR daily and cont BP log.  RTO 2 weeks for close follow-up.    - Comprehensive Metabolic Panel; Future  - CBC; Future  - CBC  - Comprehensive Metabolic Panel            Mari Gold MD

## 2025-03-21 ENCOUNTER — RESULTS FOLLOW-UP (OUTPATIENT)
Age: 35
End: 2025-03-21

## 2025-04-04 ENCOUNTER — OFFICE VISIT (OUTPATIENT)
Age: 35
End: 2025-04-04
Payer: COMMERCIAL

## 2025-04-04 VITALS
BODY MASS INDEX: 31.1 KG/M2 | WEIGHT: 169 LBS | SYSTOLIC BLOOD PRESSURE: 122 MMHG | TEMPERATURE: 98.1 F | DIASTOLIC BLOOD PRESSURE: 81 MMHG | OXYGEN SATURATION: 96 % | RESPIRATION RATE: 16 BRPM | HEIGHT: 62 IN | HEART RATE: 79 BPM

## 2025-04-04 DIAGNOSIS — O14.93 PRE-ECLAMPSIA IN THIRD TRIMESTER: ICD-10-CM

## 2025-04-04 DIAGNOSIS — G89.18 POSTOPERATIVE PAIN: ICD-10-CM

## 2025-04-04 DIAGNOSIS — N17.9 AKI (ACUTE KIDNEY INJURY): ICD-10-CM

## 2025-04-04 PROCEDURE — G8427 DOCREV CUR MEDS BY ELIG CLIN: HCPCS | Performed by: OBSTETRICS & GYNECOLOGY

## 2025-04-04 PROCEDURE — 99213 OFFICE O/P EST LOW 20 MIN: CPT | Performed by: OBSTETRICS & GYNECOLOGY

## 2025-04-04 PROCEDURE — G8417 CALC BMI ABV UP PARAM F/U: HCPCS | Performed by: OBSTETRICS & GYNECOLOGY

## 2025-04-04 PROCEDURE — 1111F DSCHRG MED/CURRENT MED MERGE: CPT | Performed by: OBSTETRICS & GYNECOLOGY

## 2025-04-04 PROCEDURE — 1036F TOBACCO NON-USER: CPT | Performed by: OBSTETRICS & GYNECOLOGY

## 2025-04-04 NOTE — PROGRESS NOTES
Tyrese King is a 35 y.o. female presents for a problem visit.    Chief Complaint   Patient presents with    Postpartum Care     Patient arrived today for a follow up mood check and labwork.  She states that she does have dizziness and headaches, but states that she does not know what is \"normal\" postpartum. She is having vaginal spotting.  Breastfeeding.    EPDS filled out--6      1. Have you been to the ER, urgent care clinic, or hospitalized since your last visit? No    2. Have you seen or consulted any other health care providers outside of the [facility] since your last visit? No     Alana Tom RN.

## 2025-04-04 NOTE — PROGRESS NOTES
Problem Visit    Tyrese King is a 35 y.o.  presenting for problem visit.     Her main concern today is follow-up after a complicated delivery.     Sleeping a lot, still tired. Unsure of what is normal at this stage.     Stopped the nifedipine last week after we advised her it was okay due to her blood pressures normalizing.     She is still getting intermittent headaches. Tyrese feels like the headaches depends on what she eats.      Incisional pain present at the lateral edge on the right side.     Off of work until end of May.        Past Medical History:   Diagnosis Date    Diabetes (HCC)     Yes. Gestational on Metformin    Gestational diabetes     Kidney congenitally absent, right     Only left kidney present. Enlarged good function       Past Surgical History:   Procedure Laterality Date     SECTION N/A 3/6/2025     SECTION performed by Mira Pate MD at [facility] L&D OR    IR EMBOLIZATION HEMORRHAGE  3/7/2025    IR EMBOLIZATION HEMORRHAGE 3/7/2025 [facility] RAD ANGIO IR    OTHER SURGICAL HISTORY      [place] teeth extraction       History reviewed. No pertinent family history.    Social History     Socioeconomic History    Marital status:      Spouse name: Not on file    Number of children: Not on file    Years of education: Not on file    Highest education level: Not on file   Occupational History    Not on file   Tobacco Use    Smoking status: Never    Smokeless tobacco: Never   Vaping Use    Vaping status: Never Used   Substance and Sexual Activity    Alcohol use: Not Currently    Drug use: Never    Sexual activity: Yes     Partners: Male     Birth control/protection: None   Other Topics Concern    Not on file   Social History Narrative    Not on file     Social Drivers of Health     Financial Resource Strain: Low Risk  (2024)    Overall Financial Resource Strain (CARDIA)     Difficulty of Paying Living Expenses: Not hard at all   Food Insecurity: No Food Insecurity (3/5/2025)    Hunger

## 2025-05-19 NOTE — PROGRESS NOTES
Tyrese King is a 35 y.o. female  presents for a problem visit.    Chief Complaint   Patient presents with    Mood Check         OB/GYN History   - LTCS x 2  Hx of STI - No  SA - Not Currently, Male      No LMP recorded.  Menses: Absent  Contraception: None        The patient is here for a mood check. Depression screening obtained from patient. Patient reports increased symptoms: Anxiety Attacks.     Postpartum Depression: High Risk (3/9/2025)    False Pass  Depression Scale     Last EPDS Total Score: 13     Last EPDS Self Harm Result: Never           False Pass  Depression Scale as of 25      Current EPDS Total Score: 10      Current EPDS Self Harm Result: Never         1. Have you been to the ER, urgent care clinic, or hospitalized since your last visit? No  2. Have you seen or consulted any other health care providers outside of the [facility] since your last visit? No      She declines  a chaperone during the gynecologic exam today.

## 2025-05-21 ENCOUNTER — OFFICE VISIT (OUTPATIENT)
Age: 35
End: 2025-05-21
Payer: COMMERCIAL

## 2025-05-21 VITALS
SYSTOLIC BLOOD PRESSURE: 130 MMHG | OXYGEN SATURATION: 97 % | RESPIRATION RATE: 16 BRPM | TEMPERATURE: 98.1 F | DIASTOLIC BLOOD PRESSURE: 87 MMHG | BODY MASS INDEX: 30.76 KG/M2 | WEIGHT: 168.2 LBS | HEART RATE: 73 BPM

## 2025-05-21 DIAGNOSIS — M79.605 LEG PAIN, ANTERIOR, LEFT: ICD-10-CM

## 2025-05-21 DIAGNOSIS — N17.9 AKI (ACUTE KIDNEY INJURY): ICD-10-CM

## 2025-05-21 DIAGNOSIS — O14.93 PRE-ECLAMPSIA IN THIRD TRIMESTER: ICD-10-CM

## 2025-05-21 PROCEDURE — 1036F TOBACCO NON-USER: CPT | Performed by: OBSTETRICS & GYNECOLOGY

## 2025-05-21 PROCEDURE — 99214 OFFICE O/P EST MOD 30 MIN: CPT | Performed by: OBSTETRICS & GYNECOLOGY

## 2025-05-21 PROCEDURE — G8427 DOCREV CUR MEDS BY ELIG CLIN: HCPCS | Performed by: OBSTETRICS & GYNECOLOGY

## 2025-05-21 PROCEDURE — G8417 CALC BMI ABV UP PARAM F/U: HCPCS | Performed by: OBSTETRICS & GYNECOLOGY

## 2025-05-21 NOTE — PROGRESS NOTES
Problem Visit    Tyrese King is a 35 y.o.  presenting for problem visit.     Her main concern today is mood check, but she is also here to follow-up on her complicated delivery (GIOVANI, pre-eclampsia, PPH).      She presents to follow-up on her mood after a complicated  on 3/6/25 with massive PPH requiring blood transfusions and ICU stay. She also developed pre-eclampsia during her hospitalization.     She has a viral URI currently.      She is nursing.      She still has nerve pain in her left leg and tingling in her hands.      Tyrese still feels tired, weak, mood is down. She has been replaying the events of her hospitalization, and is trying to cope. She has a mix of emotions, including feeling confused if how she is feeling is normal or not.  Does not feel ready to go back to work next week.   Frequent crying, low energy.     EPDS today 10      Past Medical History:   Diagnosis Date    Diabetes (HCC)     Yes. Gestational on Metformin    Gestational diabetes     Kidney congenitally absent, right     Only left kidney present. Enlarged good function       Past Surgical History:   Procedure Laterality Date     SECTION N/A 3/6/2025     SECTION performed by Mira Pate MD at [facility] L&D OR    IR EMBOLIZATION HEMORRHAGE  3/7/2025    IR EMBOLIZATION HEMORRHAGE 3/7/2025 [facility] RAD ANGIO IR    OTHER SURGICAL HISTORY      [place] teeth extraction       History reviewed. No pertinent family history.    Social History     Socioeconomic History    Marital status:      Spouse name: Not on file    Number of children: Not on file    Years of education: Not on file    Highest education level: Not on file   Occupational History    Not on file   Tobacco Use    Smoking status: Never    Smokeless tobacco: Never   Vaping Use    Vaping status: Never Used   Substance and Sexual Activity    Alcohol use: Not Currently    Drug use: Never    Sexual activity: Yes     Partners: Male     Birth control/protection:

## 2025-05-22 LAB
ALBUMIN SERPL-MCNC: 4 G/DL (ref 3.5–5)
ALBUMIN/GLOB SERPL: 1 (ref 1.1–2.2)
ALP SERPL-CCNC: 120 U/L (ref 45–117)
ALT SERPL-CCNC: 20 U/L (ref 12–78)
ANION GAP SERPL CALC-SCNC: 7 MMOL/L (ref 2–12)
AST SERPL-CCNC: 16 U/L (ref 15–37)
BILIRUB SERPL-MCNC: 0.3 MG/DL (ref 0.2–1)
BUN SERPL-MCNC: 18 MG/DL (ref 6–20)
BUN/CREAT SERPL: 24 (ref 12–20)
CALCIUM SERPL-MCNC: 10.2 MG/DL (ref 8.5–10.1)
CHLORIDE SERPL-SCNC: 102 MMOL/L (ref 97–108)
CO2 SERPL-SCNC: 29 MMOL/L (ref 21–32)
CREAT SERPL-MCNC: 0.76 MG/DL (ref 0.55–1.02)
ERYTHROCYTE [DISTWIDTH] IN BLOOD BY AUTOMATED COUNT: 13.3 % (ref 11.5–14.5)
GLOBULIN SER CALC-MCNC: 4.1 G/DL (ref 2–4)
GLUCOSE SERPL-MCNC: 82 MG/DL (ref 65–100)
HCT VFR BLD AUTO: 42.7 % (ref 35–47)
HGB BLD-MCNC: 13.8 G/DL (ref 11.5–16)
MCH RBC QN AUTO: 29.6 PG (ref 26–34)
MCHC RBC AUTO-ENTMCNC: 32.3 G/DL (ref 30–36.5)
MCV RBC AUTO: 91.6 FL (ref 80–99)
NRBC # BLD: 0 K/UL (ref 0–0.01)
NRBC BLD-RTO: 0 PER 100 WBC
PLATELET # BLD AUTO: 325 K/UL (ref 150–400)
PMV BLD AUTO: 10.1 FL (ref 8.9–12.9)
POTASSIUM SERPL-SCNC: 4.4 MMOL/L (ref 3.5–5.1)
PROT SERPL-MCNC: 8.1 G/DL (ref 6.4–8.2)
RBC # BLD AUTO: 4.66 M/UL (ref 3.8–5.2)
SODIUM SERPL-SCNC: 138 MMOL/L (ref 136–145)
WBC # BLD AUTO: 6.7 K/UL (ref 3.6–11)

## 2025-05-23 ENCOUNTER — RESULTS FOLLOW-UP (OUTPATIENT)
Age: 35
End: 2025-05-23

## 2025-05-29 ENCOUNTER — HOSPITAL ENCOUNTER (OUTPATIENT)
Dept: PHYSICAL THERAPY | Facility: HOSPITAL | Age: 35
Setting detail: RECURRING SERIES
End: 2025-05-29
Attending: OBSTETRICS & GYNECOLOGY
Payer: COMMERCIAL

## 2025-05-29 PROCEDURE — 97162 PT EVAL MOD COMPLEX 30 MIN: CPT | Performed by: PHYSICAL THERAPIST

## 2025-05-29 PROCEDURE — 97112 NEUROMUSCULAR REEDUCATION: CPT | Performed by: PHYSICAL THERAPIST

## 2025-05-29 PROCEDURE — 97110 THERAPEUTIC EXERCISES: CPT | Performed by: PHYSICAL THERAPIST

## 2025-05-29 NOTE — THERAPY EVALUATION
[facility], [facility],   a part of [address], [address]  [place] [address]  Phone: [phone]  Fax: [phone]           PHYSICAL THERAPY - EVALUATION/PLAN OF CARE NOTE (updated 3/23)      Date: 2025          Patient Name:  Tyrese King :  1990   Medical   Diagnosis:  Leg pain, anterior, left [M79.605] Treatment Diagnosis:  M54.42  LUMBAGO WITH SCIATICA, LEFT SIDE    Referral Source:  Mari Gold MD Provider #:  [id number]                Insurance: Payor: [employer] / Plan: [employer] - CHOICE PLUS / Product Type: *No Product type* /      Patient  verified yes     Visit #   Current  / Total 1 24   Time   In / Out 1310 1425   Total Treatment Time 75   Total Timed Codes 25         SUBJECTIVE  If an interpreting service was utilized for treatment of this patient, the contents of this document represent the material reviewed with the patient via the .     Pain Level (0-10 scale): 3/10  [x]constant []intermittent []improving []worsening []no change since onset    Any medication changes, allergies to medications, adverse drug reactions, diagnosis change, or new procedure performed?: [x] No    [] Yes (see summary sheet for update)  Medications: Verified on Patient Summary List    Subjective functional status/changes:     \"It is just always there right now.\"    Start of Care: 2025  Onset Date: 25  Current symptoms/Complaints: low back pain with radiating pain,   Mechanism of Injury: Pt reports that she attempted a  and then  to an emergency . She hemorrhaged badly and had to have several liters replaced. She had IR surgery and was in the hospital for about a week afterwards. She reports a history of sciatic during this pregnancy and her last one but this time her pain has continued after her pregnancy and radiates down her LLE. She trialed a massage earlier this week with no improvement in

## 2025-06-02 ENCOUNTER — TELEPHONE (OUTPATIENT)
Age: 35
End: 2025-06-02

## 2025-06-02 NOTE — TELEPHONE ENCOUNTER
Received a fax from the pt's short term benefits. They are asking for notes from March- present to support her absence from work. I am wanting to send her note from 5/21/25.     They are giving us a deadline of June 5th (Thursday).

## 2025-06-03 ENCOUNTER — HOSPITAL ENCOUNTER (OUTPATIENT)
Dept: PHYSICAL THERAPY | Facility: HOSPITAL | Age: 35
Setting detail: RECURRING SERIES
Discharge: HOME | End: 2025-06-06
Attending: OBSTETRICS & GYNECOLOGY
Payer: COMMERCIAL

## 2025-06-03 PROCEDURE — 97110 THERAPEUTIC EXERCISES: CPT

## 2025-06-03 PROCEDURE — 97535 SELF CARE MNGMENT TRAINING: CPT

## 2025-06-03 PROCEDURE — 97112 NEUROMUSCULAR REEDUCATION: CPT

## 2025-06-03 PROCEDURE — G0283 ELEC STIM OTHER THAN WOUND: HCPCS

## 2025-06-04 DIAGNOSIS — N39.46 MIXED INCONTINENCE URGE AND STRESS: ICD-10-CM

## 2025-06-04 DIAGNOSIS — R10.2 PELVIC PAIN: ICD-10-CM

## 2025-06-04 DIAGNOSIS — M79.605 LEG PAIN, ANTERIOR, LEFT: Primary | ICD-10-CM

## 2025-06-04 DIAGNOSIS — K64.9 HEMORRHOIDS, UNSPECIFIED HEMORRHOID TYPE: ICD-10-CM

## 2025-06-05 ENCOUNTER — HOSPITAL ENCOUNTER (OUTPATIENT)
Dept: PHYSICAL THERAPY | Facility: HOSPITAL | Age: 35
Setting detail: RECURRING SERIES
Discharge: HOME | End: 2025-06-08
Attending: OBSTETRICS & GYNECOLOGY
Payer: COMMERCIAL

## 2025-06-05 PROCEDURE — 97140 MANUAL THERAPY 1/> REGIONS: CPT

## 2025-06-05 PROCEDURE — 97112 NEUROMUSCULAR REEDUCATION: CPT

## 2025-06-05 PROCEDURE — 97535 SELF CARE MNGMENT TRAINING: CPT

## 2025-06-05 NOTE — PROGRESS NOTES
and proprioception to improve patient's ability to develop conscious control of individual muscles and awareness of position of extremities in order to progress to PLOF and address remaining functional goals. (see flow sheet as applicable)     Details if applicable:  TA activation paired with kegel activity and exhale.    15  94403 Manual Therapy (timed):  decrease pain, increase ROM, and increase tissue extensibility to improve patient's ability to progress to PLOF and address remaining functional goals.  The manual therapy interventions were performed at a separate and distinct time from the therapeutic activities interventions . (see flow sheet as applicable)    Details if applicable:  Pelvic floor stretch, pelvic floor release of the iliococcygeus          Details if applicable:            Details if applicable:     55     Total Total       [x]  Patient Education billed concurrently with other procedures   [x] Review HEP    [] Progressed/Changed HEP, detail:    [] Other detail:         Other Objective/Functional Measures      Pelvic exam: normal external genitalia, vulva, vagina, cervix, uterus and adnexa, VAGINA: normal appearing vagina with normal color and discharge, no lesions, PELVIC FLOOR EXAM: no cystocele, rectocele or prolapse noted.     PERF SCORE:   Power: 3+/5      Endurance: 10''  Repetitions: 2  Fast: 15    Bladder History:   Urination frequency per day: Increased   Stress incontinence present with    1. Coughing: yes    2. Sneezing: yes   3. Laughing: yes    4: Lifting: yes   5: Running: yes     [place]:   Sexually Active: no   Pain with intercourse: yes         Pain Level at end of session (0-10 scale): 3/10      Assessment   Pt did well with progressions. She has tenderness at 8 o'clock on R side of pelvic floor. She also has difficulty with pelvic floor relaxation techniques and delayed relaxation. She does not return to full resting tone following interventions     Patient will continue to

## 2025-06-10 ENCOUNTER — HOSPITAL ENCOUNTER (OUTPATIENT)
Dept: PHYSICAL THERAPY | Facility: HOSPITAL | Age: 35
Setting detail: RECURRING SERIES
Discharge: HOME | End: 2025-06-13
Attending: OBSTETRICS & GYNECOLOGY
Payer: COMMERCIAL

## 2025-06-10 PROCEDURE — 97112 NEUROMUSCULAR REEDUCATION: CPT | Performed by: PHYSICAL THERAPIST

## 2025-06-10 PROCEDURE — 97110 THERAPEUTIC EXERCISES: CPT | Performed by: PHYSICAL THERAPIST

## 2025-06-10 NOTE — PROGRESS NOTES
PHYSICAL THERAPY - DAILY TREATMENT NOTE (updated 3/23)      Date: 6/10/2025          Patient Name:  Tyrese King :  1990   Medical   Diagnosis:  Leg pain, anterior, left [M79.605] Treatment Diagnosis:  M79.605  Pain in left leg  and M62.838  OTHER MUSCLE WEAKNESS and N39.46  MIXED INCONTINENCE    Referral Source:  Mari Gold MD Insurance:   Payor: [employer] / Plan: UNITED HEALTHCARE - CHOICE PLUS / Product Type: *No Product type* /                     Patient  verified yes     Visit #   Current  / Total 4 24   Time   In / Out 1300 1400   Total Treatment Time 60   Total Timed Codes 52         SUBJECTIVE  If an interpreting service was utilized for treatment of this patient, the contents of this document represent the material reviewed with the patient via the .     Pain Level (0-10 scale): 2/10    Any medication changes, allergies to medications, adverse drug reactions, diagnosis change, or new procedure performed?: [x] No    [] Yes (see summary sheet for update)  Medications: Verified on Patient Summary List    Subjective functional status/changes:     Pt reports continued Leg pain with numbness. She notes an increase in symptoms with carrying her little one. She also reports onset of hemorrhoids as well as numbness in the Les. Will continue to progress per POC.     OBJECTIVE      Therapeutic Procedures:  Tx Min Billable or 1:1 Min (if diff from Tx Min) Procedure, Rationale, Specifics   12  51325 Self Care/Home Management (timed):  improve patient knowledge and understanding of home injury/symptom/pain management, positioning, and posture/ergonomics  to improve patient's ability to progress to PLOF and address remaining functional goals.  (see flow sheet as applicable)     Details if applicable:     40  43196 Neuromuscular Re-Education (timed):  improve balance, coordination, kinesthetic sense, posture, core stability and proprioception to improve patient's ability to develop

## 2025-06-12 ENCOUNTER — APPOINTMENT (OUTPATIENT)
Dept: PHYSICAL THERAPY | Facility: HOSPITAL | Age: 35
End: 2025-06-12
Attending: OBSTETRICS & GYNECOLOGY
Payer: COMMERCIAL

## 2025-06-17 ENCOUNTER — OFFICE VISIT (OUTPATIENT)
Age: 35
End: 2025-06-17
Payer: COMMERCIAL

## 2025-06-17 ENCOUNTER — APPOINTMENT (OUTPATIENT)
Dept: PHYSICAL THERAPY | Facility: HOSPITAL | Age: 35
End: 2025-06-17
Attending: OBSTETRICS & GYNECOLOGY
Payer: COMMERCIAL

## 2025-06-17 VITALS
TEMPERATURE: 97.9 F | RESPIRATION RATE: 16 BRPM | WEIGHT: 166 LBS | HEART RATE: 67 BPM | BODY MASS INDEX: 30.55 KG/M2 | HEIGHT: 62 IN | DIASTOLIC BLOOD PRESSURE: 80 MMHG | OXYGEN SATURATION: 96 % | SYSTOLIC BLOOD PRESSURE: 115 MMHG

## 2025-06-17 PROCEDURE — 1036F TOBACCO NON-USER: CPT | Performed by: OBSTETRICS & GYNECOLOGY

## 2025-06-17 PROCEDURE — G8427 DOCREV CUR MEDS BY ELIG CLIN: HCPCS | Performed by: OBSTETRICS & GYNECOLOGY

## 2025-06-17 PROCEDURE — 99213 OFFICE O/P EST LOW 20 MIN: CPT | Performed by: OBSTETRICS & GYNECOLOGY

## 2025-06-17 PROCEDURE — G8417 CALC BMI ABV UP PARAM F/U: HCPCS | Performed by: OBSTETRICS & GYNECOLOGY

## 2025-06-17 ASSESSMENT — PATIENT HEALTH QUESTIONNAIRE - PHQ9
1. LITTLE INTEREST OR PLEASURE IN DOING THINGS: SEVERAL DAYS
SUM OF ALL RESPONSES TO PHQ QUESTIONS 1-9: 2
SUM OF ALL RESPONSES TO PHQ QUESTIONS 1-9: 2
2. FEELING DOWN, DEPRESSED OR HOPELESS: SEVERAL DAYS
SUM OF ALL RESPONSES TO PHQ QUESTIONS 1-9: 2
SUM OF ALL RESPONSES TO PHQ QUESTIONS 1-9: 2

## 2025-06-17 NOTE — PROGRESS NOTES
Problem Visit    Tyrese King is a 35 y.o.  presenting for problem visit.     Her main concern today is continued postpartum follow-up from a difficult and complicated delivery on 3/6/25.      She has been working with PT.  Her hip/groin pain is improving.      She has started seeing a therapist through a service provided by her work ([employer]). Has not established with a post-partum specific provider as she did not want to bring up the events and trauma of her delivery again to a new person.      Past Medical History:   Diagnosis Date    Diabetes (HCC)     Yes. Gestational on Metformin    Gestational diabetes     Kidney congenitally absent, right     Only left kidney present. Enlarged good function       Past Surgical History:   Procedure Laterality Date     SECTION N/A 3/6/2025     SECTION performed by Mira Pate MD at [facility] L&D OR    IR EMBOLIZATION HEMORRHAGE  3/7/2025    IR EMBOLIZATION HEMORRHAGE 3/7/2025 [facility] RAD ANGIO IR    OTHER SURGICAL HISTORY      [place] teeth extraction       History reviewed. No pertinent family history.    Social History     Socioeconomic History    Marital status:      Spouse name: Not on file    Number of children: Not on file    Years of education: Not on file    Highest education level: Not on file   Occupational History    Not on file   Tobacco Use    Smoking status: Never    Smokeless tobacco: Never   Vaping Use    Vaping status: Never Used   Substance and Sexual Activity    Alcohol use: Not Currently    Drug use: Never    Sexual activity: Yes     Partners: Male     Birth control/protection: None   Other Topics Concern    Not on file   Social History Narrative    Not on file     Social Drivers of Health     Financial Resource Strain: Low Risk  (2024)    Overall Financial Resource Strain (CARDIA)     Difficulty of Paying Living Expenses: Not hard at all   Food Insecurity: No Food Insecurity (3/5/2025)    Hunger Vital Sign     Worried About

## 2025-06-17 NOTE — PROGRESS NOTES
Identified pt with two pt identifiers(name and ). Reviewed record in preparation for visit and have obtained necessary documentation. All patient medications has been reviewed.    Chief Complaint   Patient presents with    Postpartum Care     Recovery follow up            Vitals:    25 1352   BP: 115/80   BP Site: Left Upper Arm   Patient Position: Sitting   BP Cuff Size: Small Adult   Pulse: 67   Resp: 16   Temp: 97.9 °F (36.6 °C)   TempSrc: Oral   SpO2: 96%   Weight: 75.3 kg (166 lb)   Height: 1.575 m (5' 2\")      .  \"Have you been to the ER, urgent care clinic since your last visit?  Hospitalized since your last visit?\"    NO    “Have you seen or consulted any other health care providers outside our system since your last visit?”    NO

## 2025-06-19 ENCOUNTER — HOSPITAL ENCOUNTER (OUTPATIENT)
Dept: PHYSICAL THERAPY | Facility: HOSPITAL | Age: 35
Setting detail: RECURRING SERIES
Discharge: HOME | End: 2025-06-22
Attending: OBSTETRICS & GYNECOLOGY
Payer: COMMERCIAL

## 2025-06-19 PROCEDURE — 97110 THERAPEUTIC EXERCISES: CPT | Performed by: PHYSICAL THERAPIST

## 2025-06-19 PROCEDURE — 97530 THERAPEUTIC ACTIVITIES: CPT | Performed by: PHYSICAL THERAPIST

## 2025-06-19 PROCEDURE — 97112 NEUROMUSCULAR REEDUCATION: CPT | Performed by: PHYSICAL THERAPIST

## 2025-06-19 NOTE — PROGRESS NOTES
PHYSICAL THERAPY - DAILY TREATMENT NOTE (updated 3/23)      Date: 2025          Patient Name:  Tyrese King :  1990   Medical   Diagnosis:  Leg pain, anterior, left [M79.605] Treatment Diagnosis:  M79.605  Pain in left leg  and M62.838  OTHER MUSCLE WEAKNESS and N39.46  MIXED INCONTINENCE    Referral Source:  Mari Gold MD Insurance:   Payor: UNITED HE[employer]CARE / Plan: UNITED HEALTHCARE - CHOICE PLUS / Product Type: *No Product type* /                     Patient  verified yes     Visit #   Current  / Total 5 24   Time   In / Out 1216 1325   Total Treatment Time 69   Total Timed Codes 59         SUBJECTIVE  If an interpreting service was utilized for treatment of this patient, the contents of this document represent the material reviewed with the patient via the .     Pain Level (0-10 scale): 3/10    Any medication changes, allergies to medications, adverse drug reactions, diagnosis change, or new procedure performed?: [x] No    [] Yes (see summary sheet for update)  Medications: Verified on Patient Summary List    Subjective functional status/changes:     Pt reports that she has been working on her core a lot lately.     OBJECTIVE      Therapeutic Procedures:  Tx Min Billable or 1:1 Min (if diff from Tx Min) Procedure, Rationale, Specifics   24  75954 Therapeutic Exercise (timed):  increase ROM, strength, coordination, balance, and proprioception to improve patient's ability to progress to PLOF and address remaining functional goals. (see flow sheet as applicable)    Details if applicable:       10  65024 Therapeutic Activity (timed):  use of dynamic activities replicating functional movements to increase ROM, strength, coordination, balance, and proprioception in order to improve patient's ability to progress to PLOF and address remaining functional goals.  (see flow sheet as applicable)    Details if applicable:  pulling 10# weight to mimic transferring her baby on high table and then

## 2025-06-24 ENCOUNTER — HOSPITAL ENCOUNTER (OUTPATIENT)
Dept: PHYSICAL THERAPY | Facility: HOSPITAL | Age: 35
Setting detail: RECURRING SERIES
Discharge: HOME | End: 2025-06-27
Attending: OBSTETRICS & GYNECOLOGY
Payer: COMMERCIAL

## 2025-06-24 PROCEDURE — 97110 THERAPEUTIC EXERCISES: CPT | Performed by: PHYSICAL THERAPIST

## 2025-06-24 PROCEDURE — 97140 MANUAL THERAPY 1/> REGIONS: CPT | Performed by: PHYSICAL THERAPIST

## 2025-06-24 PROCEDURE — 97112 NEUROMUSCULAR REEDUCATION: CPT | Performed by: PHYSICAL THERAPIST

## 2025-06-24 NOTE — PROGRESS NOTES
PHYSICAL THERAPY - DAILY TREATMENT NOTE (updated 3/23)      Date: 2025          Patient Name:  Tyrese King :  1990   Medical   Diagnosis:  Leg pain, anterior, left [M79.605] Treatment Diagnosis:  M79.605  Pain in left leg  and M62.838  OTHER MUSCLE WEAKNESS and N39.46  MIXED INCONTINENCE    Referral Source:  Mari Gold MD Insurance:   Payor: [employer] / Plan: [employer] - CHOICE PLUS / Product Type: *No Product type* /                     Patient  verified yes     Visit #   Current  / Total 6 24   Time   In / Out 1215 1314   Total Treatment Time 59   Total Timed Codes 49         SUBJECTIVE  If an interpreting service was utilized for treatment of this patient, the contents of this document represent the material reviewed with the patient via the .     Pain Level (0-10 scale): 2/10    Any medication changes, allergies to medications, adverse drug reactions, diagnosis change, or new procedure performed?: [x] No    [] Yes (see summary sheet for update)  Medications: Verified on Patient Summary List    Subjective functional status/changes:     Pt reports wanting to strengthen her ankles.     OBJECTIVE      Therapeutic Procedures:  Tx Min Billable or 1:1 Min (if diff from Tx Min) Procedure, Rationale, Specifics   24  75515 Therapeutic Exercise (timed):  increase ROM, strength, coordination, balance, and proprioception to improve patient's ability to progress to PLOF and address remaining functional goals. (see flow sheet as applicable)    Details if applicable:       9  97655 Manual Therapy (timed):  decrease pain, increase ROM, increase tissue extensibility, and decrease trigger points to improve patient's ability to progress to PLOF and address remaining functional goals.  The manual therapy interventions were performed at a separate and distinct time from the therapeutic activities interventions . (see flow sheet as applicable)    Details if applicable:  hooklying traction

## 2025-06-26 ENCOUNTER — APPOINTMENT (OUTPATIENT)
Dept: PHYSICAL THERAPY | Facility: HOSPITAL | Age: 35
End: 2025-06-26
Attending: OBSTETRICS & GYNECOLOGY
Payer: COMMERCIAL

## 2025-07-10 ENCOUNTER — HOSPITAL ENCOUNTER (OUTPATIENT)
Dept: PHYSICAL THERAPY | Facility: HOSPITAL | Age: 35
Setting detail: RECURRING SERIES
Discharge: HOME | End: 2025-07-13
Attending: OBSTETRICS & GYNECOLOGY
Payer: COMMERCIAL

## 2025-07-10 PROCEDURE — 97110 THERAPEUTIC EXERCISES: CPT | Performed by: PHYSICAL THERAPIST

## 2025-07-10 PROCEDURE — 97140 MANUAL THERAPY 1/> REGIONS: CPT | Performed by: PHYSICAL THERAPIST

## 2025-07-10 PROCEDURE — 97112 NEUROMUSCULAR REEDUCATION: CPT | Performed by: PHYSICAL THERAPIST

## 2025-07-10 NOTE — PROGRESS NOTES
PHYSICAL THERAPY - DAILY TREATMENT NOTE (updated 3/23)      Date: 7/10/2025          Patient Name:  Tyrese King :  1990   Medical   Diagnosis:  Leg pain, anterior, left [M79.605] Treatment Diagnosis:  M79.605  Pain in left leg  and M62.838  OTHER MUSCLE WEAKNESS and N39.46  MIXED INCONTINENCE    Referral Source:  Mari Gold MD Insurance:   Payor: [employer] / Plan: [employer] - CHOICE PLUS / Product Type: *No Product type* /                     Patient  verified yes     Visit #   Current  / Total 6 24   Time   In / Out 1217 1312   Total Treatment Time 55   Total Timed Codes 55         SUBJECTIVE  If an interpreting service was utilized for treatment of this patient, the contents of this document represent the material reviewed with the patient via the .     Pain Level (0-10 scale): 2/10    Any medication changes, allergies to medications, adverse drug reactions, diagnosis change, or new procedure performed?: [x] No    [] Yes (see summary sheet for update)  Medications: Verified on Patient Summary List    Subjective functional status/changes:     Pt reports continued pain down into her foot and states that she saw her massage therapist who could not help either.      OBJECTIVE      Therapeutic Procedures:  Tx Min Billable or 1:1 Min (if diff from Tx Min) Procedure, Rationale, Specifics   35  79937 Therapeutic Exercise (timed):  increase ROM, strength, coordination, balance, and proprioception to improve patient's ability to progress to PLOF and address remaining functional goals. (see flow sheet as applicable)    Details if applicable:       10  51394 Manual Therapy (timed):  decrease pain, increase ROM, increase tissue extensibility, and decrease trigger points to improve patient's ability to progress to PLOF and address remaining functional goals.  The manual therapy interventions were performed at a separate and distinct time from the therapeutic activities interventions .

## 2025-07-14 NOTE — PROGRESS NOTES
[facility], Former[facility],   a part of [address], [address]  [place] [address]  Phone: [phone]  Fax: [phone]      DISCHARGE SUMMARY  Patient Name: Tyrese King : 1990   Treatment/Medical Diagnosis: Leg pain, anterior, left [M79.605]   Referral Source: Mari Gold MD     Date of Initial Visit: 25 Attended Visits: 7 Missed Visits: 0     SUMMARY OF TREATMENT  Patient completed 7 visits to address L LE pain with minimal response to interventions. She continues to present with symptoms consistent with possible disc pathology/lumbar radiculopathy and reports progressing numbness into her L foot. She had limited tolerance to attempted lumbar and hip distraction, core and hip strengthening, and ADL training. Based on her progressing symptoms, recommended she reach out to her referring physician to discuss if additional imaging and pain management would be warranted. Patient in agreement with this plan.     CURRENT STATUS  FOTO score: 49/100 (38/100)     Short and Long Term Goals: To be accomplished in 16-24 treatments.  1) Pt will report being able to consistently nurse her son without report of radicular symptoms. Discontinue  2) Pt will demonstrate improvement in FOTO score to >=63/100 to indicate improvement in functional mobility. Progressed fairly-discontinue  3) Pt will be able to stand greater than 20 minutes without increase of pain Discontinue  4) Pt will be able to walk >=30 minutes without an increase of pain Progressed fairly. Discontinue   5) Pt will be able to  her son from any surface without an increase in pain  Discontinue   6)  Pt will be independent in progressive HEP. Discontinue         RECOMMENDATIONS  Other: Discharge due to limited tolerance with skilled PT 2/2 to high levels of pain and progressing neurological symptoms and recommended patient return to referring physician for recommendations for additional pain

## 2025-07-25 ENCOUNTER — OFFICE VISIT (OUTPATIENT)
Age: 35
End: 2025-07-25
Payer: COMMERCIAL

## 2025-07-25 VITALS
HEIGHT: 62 IN | SYSTOLIC BLOOD PRESSURE: 124 MMHG | BODY MASS INDEX: 30.55 KG/M2 | DIASTOLIC BLOOD PRESSURE: 86 MMHG | HEART RATE: 72 BPM | OXYGEN SATURATION: 95 % | RESPIRATION RATE: 15 BRPM | TEMPERATURE: 98.6 F | WEIGHT: 166 LBS

## 2025-07-25 DIAGNOSIS — M79.605 LEG PAIN, ANTERIOR, LEFT: ICD-10-CM

## 2025-07-25 PROCEDURE — 1036F TOBACCO NON-USER: CPT | Performed by: OBSTETRICS & GYNECOLOGY

## 2025-07-25 PROCEDURE — G8417 CALC BMI ABV UP PARAM F/U: HCPCS | Performed by: OBSTETRICS & GYNECOLOGY

## 2025-07-25 PROCEDURE — 99214 OFFICE O/P EST MOD 30 MIN: CPT | Performed by: OBSTETRICS & GYNECOLOGY

## 2025-07-25 PROCEDURE — G8427 DOCREV CUR MEDS BY ELIG CLIN: HCPCS | Performed by: OBSTETRICS & GYNECOLOGY

## 2025-07-25 NOTE — PROGRESS NOTES
Problem Visit    Tyrese King is a 35 y.o.  presenting for problem visit.     Her main concern today is continued postpartum follow-up.     Discharged from PT as she has not been making progress. Her PT recommended Ortho or Neurology evaluation.     She feels her mental health is overall doing much better compared to her visit last month.  Still has moments of feeling fatigued and overwhelmed, but less so.      For now, she plans to return to work in early Aug (Aug 4).  Continuing to follow with [facility], now under the care of a different provider due to paperwork logistics.     EDS today: 12    Past Medical History:   Diagnosis Date    Diabetes (HCC)     Yes. Gestational on Metformin    Gestational diabetes     Kidney congenitally absent, right     Only left kidney present. Enlarged good function       Past Surgical History:   Procedure Laterality Date     SECTION N/A 3/6/2025     SECTION performed by Mira Pate MD at [facility] L&D OR    IR EMBOLIZATION HEMORRHAGE  3/7/2025    IR EMBOLIZATION HEMORRHAGE 3/7/2025 [facility] RAD ANGIO IR    OTHER SURGICAL HISTORY      [place] teeth extraction       History reviewed. No pertinent family history.    Social History     Socioeconomic History    Marital status:      Spouse name: Not on file    Number of children: Not on file    Years of education: Not on file    Highest education level: Not on file   Occupational History    Not on file   Tobacco Use    Smoking status: Never    Smokeless tobacco: Never   Vaping Use    Vaping status: Never Used   Substance and Sexual Activity    Alcohol use: Not Currently    Drug use: Never    Sexual activity: Yes     Partners: Male     Birth control/protection: None   Other Topics Concern    Not on file   Social History Narrative    Not on file     Social Drivers of Health     Financial Resource Strain: Low Risk  (2024)    Overall Financial Resource Strain (CARDIA)     Difficulty of Paying Living Expenses: Not

## 2025-07-25 NOTE — PROGRESS NOTES
Tyrese King is a 35 y.o. female presents for a problem visit.    Chief Complaint   Patient presents with    Follow-up     No LMP recorded.    Last Pap: normal obtained 3 year(s) ago.    The patient is reporting that she is here to follow up from postpartum.  She states that after ongoing physical therapy and mental health counseling she is doing \"much better.\"  She states that physical therapy told her to come to us to see if further action was needed regarding ongoing left pelvis/hip/foot/wrist pain.        1. Have you been to the ER, urgent care clinic, or hospitalized since your last visit? No    2. Have you seen or consulted any other health care providers outside of the [facility] since your last visit? No     Alana Tom RN.

## 2025-09-02 ENCOUNTER — HOSPITAL ENCOUNTER (OUTPATIENT)
Facility: HOSPITAL | Age: 35
Discharge: HOME | End: 2025-09-05

## (undated) DEVICE — PACK PROCEDURE SURG C SECT KT SMH

## (undated) DEVICE — ELECTROSURGICAL DEVICE HOLSTER;FOR USE WITH MAXIMUM PEAK VOLTAGE OF 4000 V: Brand: FORCE TRIVERSE

## (undated) DEVICE — POOLE SUCTION INSTRUMENT WITH REMOVABLE SHEATH: Brand: POOLE

## (undated) DEVICE — LIGHT HANDLE: Brand: DEVON

## (undated) DEVICE — YANKAUER,POOLE TIP,STERILE,50/CS: Brand: MEDLINE

## (undated) DEVICE — DRESSING SIL W4XL5IN ANTIBACT GELLING FBR CYTOFORM

## (undated) DEVICE — ATTACHMENT SMK 3/8INX10FT VALLEYLAB

## (undated) DEVICE — SOLUTION IRRIG 1000ML 0.9% SOD CHL USP POUR PLAS BTL

## (undated) DEVICE — Device: Brand: PORTEX

## (undated) DEVICE — GLOVE SURG SZ 65 THK91MIL LTX FREE SYN POLYISOPRENE

## (undated) DEVICE — ELECTRODE PT RET AD L9FT HI MOIST COND ADH HYDRGEL CORDED

## (undated) DEVICE — KENDALL SCD EXPRESS SLEEVES, KNEE LENGTH, MEDIUM: Brand: KENDALL SCD

## (undated) DEVICE — Z DISCONTINUED PACK PROCEDURE SURG C SECT KT SMH

## (undated) DEVICE — SOLUTION IRRIG 1000ML STRL H2O USP PLAS POUR BTL

## (undated) DEVICE — SUTURE VICRYL SZ 1 L36IN ABSRB VLT L36MM CT-1 1/2 CIR J347H

## (undated) DEVICE — SUTURE MCRYL SZ 0 L36IN ABSRB UD L36MM CT-1 1/2 CIR Y946H

## (undated) DEVICE — DEVON™ KNEE AND BODY STRAP 60" X 3" (1.5 M X 7.6 CM): Brand: DEVON

## (undated) DEVICE — MEDI-VAC NON-CONDUCTIVE SUCTION TUBING: Brand: CARDINAL HEALTH

## (undated) DEVICE — SUTURE VICRYL SZ 0 L36IN ABSRB UD L40MM CT 1/2 CIR TAPERPOINT J958H

## (undated) DEVICE — ADHESIVE SKN CLSR HI VISC 2-O --

## (undated) DEVICE — STERILE POLYISOPRENE POWDER-FREE SURGICAL GLOVES: Brand: PROTEXIS

## (undated) DEVICE — PREP SKN CHLRAPRP APL 26ML STR --

## (undated) DEVICE — REM POLYHESIVE ADULT PATIENT RETURN ELECTRODE: Brand: VALLEYLAB

## (undated) DEVICE — DRAPE SURG OBSTETRICS/GYNECOLOGY C-SECTION FAMILY VIEWING

## (undated) DEVICE — GLOVE SURG SZ 65 L12IN FNGR THK79MIL GRN LTX FREE

## (undated) DEVICE — APPLICATOR MEDICATED 26 CC SOLUTION HI LT ORNG CHLORAPREP

## (undated) DEVICE — 3000CC GUARDIAN II: Brand: GUARDIAN

## (undated) DEVICE — ANESTHESIA TRAY SPNL W/O NDL PENCAN

## (undated) DEVICE — DEVICE ES L3M EDGE COAT HEX LOK BLDE ELECTRD HOLSTER FORC

## (undated) DEVICE — SUTURE MONOCRYL SZ 4-0 L27IN ABSRB UD L24MM PS-1 3/8 CIR PRIM Y935H

## (undated) DEVICE — SUTURE PDS II SZ 0 L36IN ABSRB VLT L40MM CT 1/2 CIR Z358T

## (undated) DEVICE — ATTACHMENT SMK EVAC 15 FTX3/8 IN

## (undated) DEVICE — SUTURE MCRYL SZ 4-0 L27IN ABSRB UD L24MM PS-1 3/8 CIR PRIM Y935H

## (undated) DEVICE — SOLIDIFIER FLUID 1.3 OZ GEL 1200CC NS

## (undated) DEVICE — DRAPE FLD WRM W44XL66IN C6L FOR INTRATEMP SYS THERMABASIN

## (undated) DEVICE — SOLIDIFIER MEDC 1200ML -- CONVERT TO 356117

## (undated) DEVICE — COVERALL PREM SMS 2XL KNIT --

## (undated) DEVICE — CATHETER URIN 16FR 30CC BLLN 2 W F LUBRI-SIL IC

## (undated) DEVICE — SUT CHRMC 2-0 27IN CT1 BRN --

## (undated) DEVICE — ROYALSILK SURGICAL GOWN, L: Brand: CONVERTORS

## (undated) DEVICE — STAPLER SKIN SQ 30 ABSRB STPL DISP INSORB ORDER VIA PHONE OR EMAIL

## (undated) DEVICE — CATH FOLEY 16F LUBRI-SIL IC --

## (undated) DEVICE — COVERALLS PROTCT 2XL WHT SMS ANTISTATIC PREM KNIT CUF FULL